# Patient Record
Sex: MALE | Race: WHITE | Employment: OTHER | URBAN - METROPOLITAN AREA
[De-identification: names, ages, dates, MRNs, and addresses within clinical notes are randomized per-mention and may not be internally consistent; named-entity substitution may affect disease eponyms.]

---

## 2017-02-22 RX ORDER — PRAVASTATIN SODIUM 20 MG/1
TABLET ORAL
Qty: 90 TAB | Refills: 0 | Status: SHIPPED | OUTPATIENT
Start: 2017-02-22 | End: 2017-06-19 | Stop reason: SDUPTHER

## 2017-04-14 RX ORDER — ALLOPURINOL 300 MG/1
TABLET ORAL
Qty: 30 TAB | Refills: 0 | Status: SHIPPED | OUTPATIENT
Start: 2017-04-14 | End: 2017-05-11 | Stop reason: SDUPTHER

## 2017-04-14 RX ORDER — TERAZOSIN 10 MG/1
CAPSULE ORAL
Qty: 30 CAP | Refills: 0 | Status: SHIPPED | OUTPATIENT
Start: 2017-04-14 | End: 2017-05-11 | Stop reason: SDUPTHER

## 2017-04-14 RX ORDER — HYDROCHLOROTHIAZIDE 25 MG/1
TABLET ORAL
Qty: 90 TAB | Refills: 0 | Status: SHIPPED | OUTPATIENT
Start: 2017-04-14 | End: 2017-07-15 | Stop reason: SDUPTHER

## 2017-05-04 RX ORDER — ATENOLOL 50 MG/1
TABLET ORAL
Qty: 180 TAB | Refills: 0 | Status: SHIPPED | OUTPATIENT
Start: 2017-05-04 | End: 2017-07-26 | Stop reason: SDUPTHER

## 2017-05-11 RX ORDER — TERAZOSIN 10 MG/1
CAPSULE ORAL
Qty: 30 CAP | Refills: 0 | Status: SHIPPED | OUTPATIENT
Start: 2017-05-11 | End: 2017-06-06 | Stop reason: ALTCHOICE

## 2017-05-11 RX ORDER — ALLOPURINOL 300 MG/1
TABLET ORAL
Qty: 30 TAB | Refills: 0 | Status: SHIPPED | OUTPATIENT
Start: 2017-05-11 | End: 2017-06-11 | Stop reason: SDUPTHER

## 2017-05-22 ENCOUNTER — TELEPHONE (OUTPATIENT)
Dept: FAMILY MEDICINE CLINIC | Age: 82
End: 2017-05-22

## 2017-05-22 RX ORDER — ALPRAZOLAM 0.5 MG/1
TABLET ORAL
Qty: 60 TAB | Refills: 3 | Status: SHIPPED | OUTPATIENT
Start: 2017-05-22 | End: 2017-10-18 | Stop reason: SDUPTHER

## 2017-06-06 ENCOUNTER — HOSPITAL ENCOUNTER (OUTPATIENT)
Dept: LAB | Age: 82
Discharge: HOME | End: 2017-06-06
Payer: MEDICARE

## 2017-06-06 ENCOUNTER — OFFICE VISIT (OUTPATIENT)
Dept: FAMILY MEDICINE CLINIC | Age: 82
End: 2017-06-06

## 2017-06-06 VITALS
HEART RATE: 56 BPM | DIASTOLIC BLOOD PRESSURE: 74 MMHG | HEIGHT: 67 IN | BODY MASS INDEX: 25.9 KG/M2 | RESPIRATION RATE: 16 BRPM | WEIGHT: 165 LBS | SYSTOLIC BLOOD PRESSURE: 135 MMHG | OXYGEN SATURATION: 97 % | TEMPERATURE: 96.7 F

## 2017-06-06 DIAGNOSIS — E11.9 TYPE 2 DIABETES MELLITUS WITHOUT COMPLICATION, WITHOUT LONG-TERM CURRENT USE OF INSULIN (HCC): Primary | ICD-10-CM

## 2017-06-06 DIAGNOSIS — I10 ESSENTIAL HYPERTENSION: ICD-10-CM

## 2017-06-06 LAB — HBA1C MFR BLD HPLC: 6.8 %

## 2017-06-06 PROCEDURE — 80053 COMPREHEN METABOLIC PANEL: CPT

## 2017-06-06 PROCEDURE — 82043 UR ALBUMIN QUANTITATIVE: CPT

## 2017-06-06 PROCEDURE — 80061 LIPID PANEL: CPT

## 2017-06-06 PROCEDURE — 85025 COMPLETE CBC W/AUTO DIFF WBC: CPT

## 2017-06-06 PROCEDURE — 36415 COLL VENOUS BLD VENIPUNCTURE: CPT

## 2017-06-06 RX ORDER — TRIAZOLAM 0.25 MG/1
0.25 TABLET ORAL
Qty: 30 TAB | Refills: 5 | Status: SHIPPED | OUTPATIENT
Start: 2017-06-06 | End: 2017-10-18 | Stop reason: SDUPTHER

## 2017-06-06 NOTE — MR AVS SNAPSHOT
Visit Information Date & Time Provider Department Dept. Phone Encounter #  
 6/6/2017  9:00 AM Mayra Devries MD [facility] [phone] [id number] Follow-up Instructions Return in about 4 months (around 10/6/2017). Upcoming Health Maintenance Date Due MICROALBUMIN Q1 10/19/2016 EYE EXAM RETINAL OR DILATED Q1 6/8/2017 HEMOGLOBIN A1C Q6M 6/28/2017 INFLUENZA AGE 9 TO ADULT 8/1/2017 FOOT EXAM Q1 8/2/2017 MEDICARE YEARLY EXAM 8/3/2017 Pneumococcal 65+ Low/Medium Risk (2 of 2 - PPSV23) 8/28/2017 LIPID PANEL Q1 12/28/2017 GLAUCOMA SCREENING Q2Y 6/8/2018 DTaP/Tdap/Td series (2 - Td) 5/16/2026 Allergies as of 6/6/2017  Review Complete On: 6/6/2017 By: Mayra Devries MD  
  
 Severity Noted Reaction Type Reactions Bactrim [Sulfamethoprim Ds]  11/17/2016    Nausea Only Ciprofloxacin  04/18/2014   Side Effect Other (comments) Nauseated, felt fuzzy Erythromycin  04/30/2010    Unknown (comments) Pcn [Penicillins]  04/30/2010    Swelling Current Immunizations  Reviewed on 8/2/2016 Name Date Influenza High Dose Vaccine PF 9/12/2016, 11/30/2015, 10/13/2014 Influenza Vaccine 11/18/2013 Influenza Vaccine Split 10/31/2012, 10/17/2011, 10/29/2010 Pneumococcal Vaccine (Unspecified Type) 8/28/2012 TD Vaccine 8/28/2012 Not reviewed this visit You Were Diagnosed With   
  
 Codes Comments Type 2 diabetes mellitus without complication, without long-term current use of insulin (HCC)    -  Primary ICD-10-CM: E11.9 ICD-9-CM: 250.00 Essential hypertension     ICD-10-CM: I10 
ICD-9-CM: 401.9 Vitals BP Pulse Temp Resp Height(growth percentile) Weight(growth percentile) 135/74 (!) 56 96.7 °F (35.9 °C) (Oral) 16 5' 7\" (1.702 m) 165 lb (74.8 kg) SpO2 BMI Smoking Status 97% 25.84 kg/m2 Former Smoker Vitals History BMI and BSA Data Body Mass Index Body Surface Area 25.84 kg/m 2 1.88 m 2 Preferred Pharmacy Pharmacy Name Phone [facility] [address], [address] [place] [address] Avenue [phone] Your Updated Medication List  
  
   
This list is accurate as of: 6/6/17 10:13 AM.  Always use your most recent med list.  
  
  
  
  
 allopurinol 300 mg tablet Commonly known as:  ZYLOPRIM  
TAKE ONE TABLET BY MOUTH ONCE DAILY TO PREVENT GOUT  
  
 ALPRAZolam 0.5 mg tablet Commonly known as:  XANAX  
TAKE ONE TABLET BY MOUTH TWICE DAILY AS NEEDED FOR ANXIETY  
  
 aspirin 325 mg tablet Commonly known as:  ASPIRIN Take 325 mg by mouth daily. atenolol 50 mg tablet Commonly known as:  TENORMIN  
TAKE ONE TABLET BY MOUTH TWICE DAILY FOR BLOOD PRESSURE  
  
 diphenoxylate-atropine 2.5-0.025 mg per tablet Commonly known as:  LOMOTIL Take 1 Tab by mouth four (4) times daily as needed for Diarrhea. Max Daily Amount: 4 Tabs. furosemide 40 mg tablet Commonly known as:  LASIX One tab po daily as needed for fluid  
  
 hydroCHLOROthiazide 25 mg tablet Commonly known as:  HYDRODIURIL  
TAKE ONE TABLET BY MOUTH ONCE DAILY MULTIPLE VITAMINS PO Take 1 Tab by mouth daily. pravastatin 20 mg tablet Commonly known as:  PRAVACHOL  
TAKE ONE TABLET BY MOUTH ONCE DAILY AT NIGHT FOR CHOLESTEROL  
  
 PRESERVISION AREDS 2 PO Take  by mouth. terazosin 10 mg capsule Commonly known as:  HYTRIN  
TAKE ONE CAPSULE BY MOUTH ONCE DAILY FOR BLOOD PRESSURE AND PROSTATE  
  
 triazolam 0.25 mg tablet Commonly known as:  Katelyn Presto Take 1 Tab by mouth nightly as needed. Max Daily Amount: 0.25 mg. VITAMIN D3 1,000 unit Cap Generic drug:  cholecalciferol Take 1 Tab by mouth daily. Prescriptions Printed Refills  
 triazolam (HALCION) 0.25 mg tablet 5 Sig: Take 1 Tab by mouth nightly as needed. Max Daily Amount: 0.25 mg.  
 Class: Print Route: Oral  
  
We Performed the Following AMB POC HEMOGLOBIN A1C [13537 CPT(R)] CBC WITH AUTOMATED DIFF [36508 CPT(R)]  DIABETES FOOT EXAM [HM7 Custom] LIPID PANEL [90172 CPT(R)] METABOLIC PANEL, COMPREHENSIVE [91786 CPT(R)] MICROALBUMIN, UR, RAND W/ MICROALBUMIN/CREA RATIO [id number] CPT(R)] Follow-up Instructions Return in about 4 months (around 10/6/2017). Introducing [facility] & HEALTH SERVICES! Dear Maria C Gilmore: 
Thank you for requesting a [employer] account. Our records indicate that you have previously registered for a [employer] account but its currently inactive. Please call our [employer] support line at [phone]. Additional Information If you have questions, please visit the Frequently Asked Questions section of the [employer] website at [email or web address] Remember, [employer] is NOT to be used for urgent needs. For medical emergencies, dial 911. Now available from your iPhone and Android! Please provide this summary of care documentation to your next provider. Your primary care clinician is listed as Heather Mohan. If you have any questions after today's visit, please call [phone].

## 2017-06-06 NOTE — PROGRESS NOTES
Chief Complaint   Patient presents with    Hypertension    Diabetes    Gout     1. Have you been to the ER, urgent care clinic since your last visit? Hospitalized since your last visit? No    2. Have you seen or consulted any other health care providers outside of the [address] [place] [address] since your last visit? Include any pap smears or colon screening.  No     Health Maintenance Due   Topic Date Due    MICROALBUMIN Q1  10/19/2016    EYE EXAM RETINAL OR DILATED Q1  06/08/2017    HEMOGLOBIN A1C Q6M  06/28/2017

## 2017-06-06 NOTE — PROGRESS NOTES
HISTORY OF PRESENT ILLNESS  Felton Choe is a 80 y.o. male. HPI Pt. Comes in for blood pressure and cholesterol check. Has been dxed with macular degeneration, and is on vitamins. Is also not sleeping well, down to 4-5 hours a night. Used to sleep much better on Halcion. Has also developed several skin tags that need to be checked. Has also been itching a lot. Feet get red when stands on feet for awhile. ROS    Physical Exam   Constitutional: He appears well-developed and well-nourished. HENT:   Right Ear: External ear normal.   Left Ear: External ear normal.   Mouth/Throat: Oropharynx is clear and moist.   Neck: No thyromegaly present. Cardiovascular: Normal rate, regular rhythm, normal heart sounds and intact distal pulses. Pulmonary/Chest: Effort normal and breath sounds normal. No respiratory distress. He has no wheezes. Abdominal: Soft. Bowel sounds are normal. He exhibits no distension and no mass. There is no tenderness. There is no guarding. Musculoskeletal: Normal range of motion. He exhibits no edema. Lymphadenopathy:     He has no cervical adenopathy. Skin:   Several seborrheic keratoses   Nursing note and vitals reviewed. ASSESSMENT and PLAN  Orders Placed This Encounter    CBC WITH AUTOMATED DIFF    METABOLIC PANEL, COMPREHENSIVE    LIPID PANEL    MICROALBUMIN, UR, RAND W/ MICROALBUMIN/CREA RATIO    AMB POC HEMOGLOBIN A1C     DIABETES FOOT EXAM    triazolam (HALCION) 0.25 mg tablet     Alfonso Alston was seen today for hypertension, diabetes and gout.     Diagnoses and all orders for this visit:    Type 2 diabetes mellitus without complication, without long-term current use of insulin ([facility])  -     CBC WITH AUTOMATED DIFF  -     METABOLIC PANEL, COMPREHENSIVE  -     LIPID PANEL  -     MICROALBUMIN, UR, RAND W/ MICROALBUMIN/CREA RATIO  -      DIABETES FOOT EXAM  -     AMB POC HEMOGLOBIN A1C    Essential hypertension    Other orders  -     triazolam (HALCION) 0.25 mg tablet; Take 1 Tab by mouth nightly as needed. Max Daily Amount: 0.25 mg. Follow-up Disposition:  Return in about 4 months (around 10/6/2017).

## 2017-06-07 LAB
ALBUMIN SERPL-MCNC: 4.6 G/DL (ref 3.5–4.7)
ALBUMIN/CREAT UR: 39.2 MG/G CREAT (ref 0–30)
ALBUMIN/GLOB SERPL: 2.3 {RATIO} (ref 1.2–2.2)
ALP SERPL-CCNC: 67 IU/L (ref 39–117)
ALT SERPL-CCNC: 15 IU/L (ref 0–44)
AST SERPL-CCNC: 14 IU/L (ref 0–40)
BASOPHILS # BLD AUTO: 0 X10E3/UL (ref 0–0.2)
BASOPHILS NFR BLD AUTO: 1 %
BILIRUB SERPL-MCNC: 1.3 MG/DL (ref 0–1.2)
BUN SERPL-MCNC: 30 MG/DL (ref 8–27)
BUN/CREAT SERPL: 25 (ref 10–24)
CALCIUM SERPL-MCNC: 9.2 MG/DL (ref 8.6–10.2)
CHLORIDE SERPL-SCNC: 100 MMOL/L (ref 96–106)
CHOLEST SERPL-MCNC: 118 MG/DL (ref 100–199)
CO2 SERPL-SCNC: 23 MMOL/L (ref 18–29)
CREAT SERPL-MCNC: 1.22 MG/DL (ref 0.76–1.27)
CREAT UR-MCNC: 60.7 MG/DL
EOSINOPHIL # BLD AUTO: 0.1 X10E3/UL (ref 0–0.4)
EOSINOPHIL NFR BLD AUTO: 2 %
ERYTHROCYTE [DISTWIDTH] IN BLOOD BY AUTOMATED COUNT: 15.5 % (ref 12.3–15.4)
GLOBULIN SER CALC-MCNC: 2 G/DL (ref 1.5–4.5)
GLUCOSE SERPL-MCNC: 153 MG/DL (ref 65–99)
HCT VFR BLD AUTO: 39 % (ref 37.5–51)
HDLC SERPL-MCNC: 30 MG/DL
HGB BLD-MCNC: 12.9 G/DL (ref 12.6–17.7)
IMM GRANULOCYTES # BLD: 0 X10E3/UL (ref 0–0.1)
IMM GRANULOCYTES NFR BLD: 0 %
INTERPRETATION, 910389: NORMAL
INTERPRETATION: NORMAL
LDLC SERPL CALC-MCNC: 64 MG/DL (ref 0–99)
LYMPHOCYTES # BLD AUTO: 1 X10E3/UL (ref 0.7–3.1)
LYMPHOCYTES NFR BLD AUTO: 17 %
Lab: NORMAL
Lab: NORMAL
MCH RBC QN AUTO: 28.3 PG (ref 26.6–33)
MCHC RBC AUTO-ENTMCNC: 33.1 G/DL (ref 31.5–35.7)
MCV RBC AUTO: 86 FL (ref 79–97)
MICROALBUMIN UR-MCNC: 23.8 UG/ML
MONOCYTES # BLD AUTO: 0.4 X10E3/UL (ref 0.1–0.9)
MONOCYTES NFR BLD AUTO: 8 %
NEUTROPHILS # BLD AUTO: 4 X10E3/UL (ref 1.4–7)
NEUTROPHILS NFR BLD AUTO: 72 %
PDF IMAGE, 910387: NORMAL
PLATELET # BLD AUTO: 113 X10E3/UL (ref 150–379)
POTASSIUM SERPL-SCNC: 4.3 MMOL/L (ref 3.5–5.2)
PROT SERPL-MCNC: 6.6 G/DL (ref 6–8.5)
RBC # BLD AUTO: 4.56 X10E6/UL (ref 4.14–5.8)
SODIUM SERPL-SCNC: 142 MMOL/L (ref 134–144)
TRIGL SERPL-MCNC: 121 MG/DL (ref 0–149)
VLDLC SERPL CALC-MCNC: 24 MG/DL (ref 5–40)
WBC # BLD AUTO: 5.5 X10E3/UL (ref 3.4–10.8)

## 2017-06-12 RX ORDER — ALLOPURINOL 300 MG/1
TABLET ORAL
Qty: 30 TAB | Refills: 0 | Status: SHIPPED | OUTPATIENT
Start: 2017-06-12 | End: 2017-07-13 | Stop reason: SDUPTHER

## 2017-06-12 RX ORDER — TERAZOSIN 10 MG/1
CAPSULE ORAL
Qty: 30 CAP | Refills: 0 | Status: SHIPPED | OUTPATIENT
Start: 2017-06-12 | End: 2017-07-13 | Stop reason: SDUPTHER

## 2017-06-19 RX ORDER — PRAVASTATIN SODIUM 20 MG/1
TABLET ORAL
Qty: 90 TAB | Refills: 0 | Status: SHIPPED | OUTPATIENT
Start: 2017-06-19 | End: 2017-09-12 | Stop reason: SDUPTHER

## 2017-07-13 RX ORDER — TERAZOSIN 10 MG/1
CAPSULE ORAL
Qty: 30 CAP | Refills: 0 | Status: SHIPPED | OUTPATIENT
Start: 2017-07-13 | End: 2017-08-11 | Stop reason: SDUPTHER

## 2017-07-13 RX ORDER — ALLOPURINOL 300 MG/1
TABLET ORAL
Qty: 30 TAB | Refills: 0 | Status: SHIPPED | OUTPATIENT
Start: 2017-07-13 | End: 2017-08-11 | Stop reason: SDUPTHER

## 2017-07-17 RX ORDER — HYDROCHLOROTHIAZIDE 25 MG/1
TABLET ORAL
Qty: 90 TAB | Refills: 0 | Status: SHIPPED | OUTPATIENT
Start: 2017-07-17 | End: 2017-10-18 | Stop reason: SDUPTHER

## 2017-07-26 RX ORDER — ATENOLOL 50 MG/1
TABLET ORAL
Qty: 180 TAB | Refills: 0 | Status: SHIPPED | OUTPATIENT
Start: 2017-07-26 | End: 2017-10-18 | Stop reason: SDUPTHER

## 2017-07-28 ENCOUNTER — TELEPHONE (OUTPATIENT)
Dept: FAMILY MEDICINE CLINIC | Age: 82
End: 2017-07-28

## 2017-07-28 NOTE — TELEPHONE ENCOUNTER
Called pt. Pt complained [facility] was out of Atenolol. Called around and surrounding [facility] are also out. Called [id number] · [phone] and they had it in stock. Got permission from pt to use that Pharmacy. Order for Atenolol verbally given to Cristian Gave Pharmacist.  Pharmacist verbalized understanding. Informed pt that RX would be ready for  in 30 minutes. Pt verbalized understanding.

## 2017-07-28 NOTE — TELEPHONE ENCOUNTER
Pt states he has been trying to get an alternative for: atenolol (TENORMIN) 50 mg tablet    States he has been trying to get it since 7/25/17    There are no requests in CC   Pls give him a call     Best number to reach him is [phone]

## 2017-08-11 RX ORDER — ALLOPURINOL 300 MG/1
TABLET ORAL
Qty: 30 TAB | Refills: 0 | Status: SHIPPED | OUTPATIENT
Start: 2017-08-11 | End: 2017-09-12 | Stop reason: SDUPTHER

## 2017-08-11 RX ORDER — TERAZOSIN 10 MG/1
CAPSULE ORAL
Qty: 30 CAP | Refills: 0 | Status: SHIPPED | OUTPATIENT
Start: 2017-08-11 | End: 2017-09-12 | Stop reason: SDUPTHER

## 2017-08-18 ENCOUNTER — DOCUMENTATION ONLY (OUTPATIENT)
Dept: FAMILY MEDICINE CLINIC | Age: 82
End: 2017-08-18

## 2017-09-12 RX ORDER — TERAZOSIN 10 MG/1
CAPSULE ORAL
Qty: 30 CAP | Refills: 0 | Status: SHIPPED | OUTPATIENT
Start: 2017-09-12 | End: 2017-10-09 | Stop reason: SDUPTHER

## 2017-09-12 RX ORDER — ALLOPURINOL 300 MG/1
TABLET ORAL
Qty: 30 TAB | Refills: 0 | Status: SHIPPED | OUTPATIENT
Start: 2017-09-12 | End: 2017-10-09 | Stop reason: SDUPTHER

## 2017-09-12 RX ORDER — PRAVASTATIN SODIUM 20 MG/1
TABLET ORAL
Qty: 90 TAB | Refills: 0 | Status: SHIPPED | OUTPATIENT
Start: 2017-09-12 | End: 2017-10-18 | Stop reason: SDUPTHER

## 2017-10-09 RX ORDER — ALLOPURINOL 300 MG/1
TABLET ORAL
Qty: 30 TAB | Refills: 0 | Status: SHIPPED | OUTPATIENT
Start: 2017-10-09 | End: 2018-07-24 | Stop reason: SDUPTHER

## 2017-10-09 RX ORDER — TERAZOSIN 10 MG/1
CAPSULE ORAL
Qty: 30 CAP | Refills: 0 | Status: SHIPPED | OUTPATIENT
Start: 2017-10-09 | End: 2017-10-18 | Stop reason: SDUPTHER

## 2017-10-18 ENCOUNTER — OFFICE VISIT (OUTPATIENT)
Dept: FAMILY MEDICINE CLINIC | Age: 82
End: 2017-10-18

## 2017-10-18 ENCOUNTER — HOSPITAL ENCOUNTER (OUTPATIENT)
Dept: LAB | Age: 82
Discharge: HOME | End: 2017-10-18
Payer: MEDICARE

## 2017-10-18 VITALS
OXYGEN SATURATION: 98 % | HEIGHT: 67 IN | SYSTOLIC BLOOD PRESSURE: 129 MMHG | BODY MASS INDEX: 25.8 KG/M2 | RESPIRATION RATE: 14 BRPM | DIASTOLIC BLOOD PRESSURE: 59 MMHG | TEMPERATURE: 96.6 F | WEIGHT: 164.4 LBS | HEART RATE: 56 BPM

## 2017-10-18 DIAGNOSIS — I10 ESSENTIAL HYPERTENSION: ICD-10-CM

## 2017-10-18 DIAGNOSIS — E11.9 DIABETES MELLITUS TYPE 2, DIET-CONTROLLED (HCC): Primary | ICD-10-CM

## 2017-10-18 DIAGNOSIS — Z23 ENCOUNTER FOR IMMUNIZATION: ICD-10-CM

## 2017-10-18 DIAGNOSIS — Z00.00 ENCOUNTER FOR MEDICARE ANNUAL WELLNESS EXAM: ICD-10-CM

## 2017-10-18 LAB — HBA1C MFR BLD HPLC: 6.4 %

## 2017-10-18 PROCEDURE — 82043 UR ALBUMIN QUANTITATIVE: CPT

## 2017-10-18 PROCEDURE — 80061 LIPID PANEL: CPT

## 2017-10-18 PROCEDURE — 36415 COLL VENOUS BLD VENIPUNCTURE: CPT

## 2017-10-18 PROCEDURE — 80053 COMPREHEN METABOLIC PANEL: CPT

## 2017-10-18 PROCEDURE — 85025 COMPLETE CBC W/AUTO DIFF WBC: CPT

## 2017-10-18 RX ORDER — TRIAZOLAM 0.25 MG/1
0.25 TABLET ORAL
Qty: 90 TAB | Refills: 1 | Status: SHIPPED | OUTPATIENT
Start: 2017-10-18 | End: 2018-01-11 | Stop reason: SDUPTHER

## 2017-10-18 RX ORDER — PRAVASTATIN SODIUM 20 MG/1
TABLET ORAL
Qty: 90 TAB | Refills: 3 | Status: SHIPPED | OUTPATIENT
Start: 2017-10-18 | End: 2018-10-22 | Stop reason: SDUPTHER

## 2017-10-18 RX ORDER — ATENOLOL 50 MG/1
TABLET ORAL
Qty: 180 TAB | Refills: 3 | Status: SHIPPED | OUTPATIENT
Start: 2017-10-18 | End: 2018-05-14 | Stop reason: SDUPTHER

## 2017-10-18 RX ORDER — ALLOPURINOL 300 MG/1
300 TABLET ORAL DAILY
Qty: 90 TAB | Refills: 3 | Status: SHIPPED | OUTPATIENT
Start: 2017-10-18 | End: 2017-12-04 | Stop reason: SDUPTHER

## 2017-10-18 RX ORDER — TERAZOSIN 10 MG/1
CAPSULE ORAL
Qty: 90 CAP | Refills: 12 | Status: SHIPPED | OUTPATIENT
Start: 2017-10-18 | End: 2018-10-22 | Stop reason: SDUPTHER

## 2017-10-18 RX ORDER — ALPRAZOLAM 0.5 MG/1
TABLET ORAL
Qty: 60 TAB | Refills: 3 | Status: SHIPPED | OUTPATIENT
Start: 2017-10-18 | End: 2018-07-24 | Stop reason: SDUPTHER

## 2017-10-18 RX ORDER — HYDROCHLOROTHIAZIDE 25 MG/1
TABLET ORAL
Qty: 90 TAB | Refills: 3 | Status: SHIPPED | OUTPATIENT
Start: 2017-10-18 | End: 2018-10-04 | Stop reason: ALTCHOICE

## 2017-10-18 NOTE — PROGRESS NOTES
HISTORY OF PRESENT ILLNESS  Ayanna De Jesus is a 80 y.o. male. HPI In for medicare wellness exam.     Review of Systems   Constitutional: Negative for malaise/fatigue and weight loss. HENT: Positive for hearing loss. Negative for tinnitus. Eyes: Positive for blurred vision (followed by Victor Manuel Mohamud and Melissa). Negative for double vision. Respiratory: Negative for cough and hemoptysis. Cardiovascular: Negative for chest pain and orthopnea. Unloaded 1 1/2 tons of gravel last week- no chest pains or dyspnea. Gastrointestinal: Negative for abdominal pain and blood in stool. Genitourinary: Negative for dysuria and hematuria. Some nocturia, helped by halcion   Skin: Negative for itching and rash. Neurological: Negative for focal weakness and loss of consciousness. No falls   Psychiatric/Behavioral: Negative for depression. The patient has insomnia (takes halcion). No alcohol       Physical Exam   Constitutional: He appears well-developed and well-nourished. HENT:   Right Ear: External ear normal.   Left Ear: External ear normal.   Mouth/Throat: Oropharynx is clear and moist.   Neck: No thyromegaly present. Cardiovascular: Normal rate, regular rhythm, normal heart sounds and intact distal pulses. Pulmonary/Chest: Effort normal and breath sounds normal. No respiratory distress. He has no wheezes. Abdominal: Soft. Bowel sounds are normal. He exhibits no distension and no mass. There is no tenderness. There is no guarding. Musculoskeletal: Normal range of motion. He exhibits no edema. Lymphadenopathy:     He has no cervical adenopathy. Nursing note and vitals reviewed.       ASSESSMENT and PLAN  Orders Placed This Encounter    Influenza virus vaccine (FLUZONE HIGH-DOSE) 65 years and older    CBC WITH AUTOMATED DIFF    METABOLIC PANEL, COMPREHENSIVE    LIPID PANEL    MICROALBUMIN, UR, RAND W/ MICROALBUMIN/CREA RATIO    AMB POC HEMOGLOBIN A1C    HM DIABETES FOOT EXAM    SC IMMUNIZ ADMIN,1 SINGLE/COMB VAC/TOXOID    ALPRAZolam (XANAX) 0.5 mg tablet    hydroCHLOROthiazide (HYDRODIURIL) 25 mg tablet    pravastatin (PRAVACHOL) 20 mg tablet    atenolol (TENORMIN) 50 mg tablet    triazolam (HALCION) 0.25 mg tablet    terazosin (HYTRIN) 10 mg capsule    allopurinol (ZYLOPRIM) 300 mg tablet     Diagnoses and all orders for this visit:    1. Diabetes mellitus type 2, diet-controlled ([facility] [address]  -     CBC WITH AUTOMATED DIFF  -     METABOLIC PANEL, COMPREHENSIVE  -     LIPID PANEL  -     AMB POC HEMOGLOBIN A1C  -     MICROALBUMIN, UR, RAND W/ MICROALBUMIN/CREA RATIO  -      DIABETES FOOT EXAM    2. Encounter for Medicare annual wellness exam    3. Essential hypertension    4. Encounter for immunization  -     Influenza virus vaccine (FLUZONE HIGH-DOSE) 65 years and older  -     SC IMMUNIZ ADMIN,1 SINGLE/COMB VAC/TOXOID    Other orders  -     ALPRAZolam (XANAX) 0.5 mg tablet; TAKE ONE TABLET BY MOUTH TWICE DAILY AS NEEDED FOR ANXIETY  -     hydroCHLOROthiazide (HYDRODIURIL) 25 mg tablet; TAKE ONE TABLET BY MOUTH ONCE DAILY  -     pravastatin (PRAVACHOL) 20 mg tablet; TAKE ONE TABLET BY MOUTH ONCE DAILY AT  NIGHT  FOR  CHOLESTEROL  -     atenolol (TENORMIN) 50 mg tablet; TAKE ONE TABLET BY MOUTH TWICE DAILY FOR BLOOD PRESSURE  -     triazolam (HALCION) 0.25 mg tablet; Take 1 Tab by mouth nightly as needed. Max Daily Amount: 0.25 mg.  -     terazosin (HYTRIN) 10 mg capsule; TAKE ONE CAPSULE BY MOUTH ONCE DAILY FOR BLOOD PRESSURE AND PROSTATE  -     allopurinol (ZYLOPRIM) 300 mg tablet; Take 1 Tab by mouth daily. To prevent gout      Follow-up Disposition:  Return in about 6 months (around 4/18/2018).

## 2017-10-18 NOTE — MR AVS SNAPSHOT
Visit Information Date & Time Provider Department Dept. Phone Encounter #  
 10/18/2017  9:00 AM Reg Rodas MD [facility] [phone] [id number] Follow-up Instructions Return in about 6 months (around 4/18/2018). Upcoming Health Maintenance Date Due  
 EYE EXAM RETINAL OR DILATED Q1 6/8/2017 INFLUENZA AGE 9 TO ADULT 8/1/2017 MEDICARE YEARLY EXAM 8/3/2017 Pneumococcal 65+ Low/Medium Risk (2 of 2 - PPSV23) 8/28/2017 HEMOGLOBIN A1C Q6M 12/6/2017 FOOT EXAM Q1 6/6/2018 MICROALBUMIN Q1 6/6/2018 LIPID PANEL Q1 6/6/2018 GLAUCOMA SCREENING Q2Y 6/8/2018 DTaP/Tdap/Td series (2 - Td) 5/16/2026 Allergies as of 10/18/2017  Review Complete On: 10/18/2017 By: Reg Rodas MD  
  
 Severity Noted Reaction Type Reactions Bactrim [Sulfamethoprim Ds]  11/17/2016    Nausea Only Ciprofloxacin  04/18/2014   Side Effect Other (comments) Nauseated, felt fuzzy Erythromycin  04/30/2010    Unknown (comments) Pcn [Penicillins]  04/30/2010    Swelling Current Immunizations  Reviewed on 8/2/2016 Name Date Influenza High Dose Vaccine PF 9/12/2016, 11/30/2015, 10/13/2014 Influenza Vaccine 11/18/2013 Influenza Vaccine Split 10/31/2012, 10/17/2011, 10/29/2010 TD Vaccine 8/28/2012 ZZZ-RETIRED (DO NOT USE) Pneumococcal Vaccine (Unspecified Type) 8/28/2012 Not reviewed this visit You Were Diagnosed With   
  
 Codes Comments Diabetes mellitus type 2, [facility]    -  Primary ICD-10-CM: E11.9 ICD-9-CM: 250.00 Encounter for Medicare annual wellness exam     ICD-10-CM: Z00.00 ICD-9-CM: V70.0 Essential hypertension     ICD-10-CM: I10 
ICD-9-CM: 401.9 Vitals BP Pulse Temp Resp Height(growth percentile) Weight(growth percentile) 129/59 (!) 56 96.6 °F (35.9 °C) (Oral) 14 5' 7\" (1.702 m) 164 lb 6.4 oz (74.6 kg) SpO2 BMI Smoking Status 98% 25.75 kg/m2 Former Smoker BMI and BSA Data Body Mass Index Body Surface Area 25.75 kg/m 2 1.88 m 2 Preferred Pharmacy Pharmacy Name Phone [facility] [address], [address] [place] [address] Avenue [phone] Your Updated Medication List  
  
   
This list is accurate as of: 10/18/17 10:05 AM.  Always use your most recent med list.  
  
  
  
  
 * allopurinol 300 mg tablet Commonly known as:  ZYLOPRIM  
TAKE ONE TABLET BY MOUTH ONCE DAILY TO PREVENT GOUT  
  
 * allopurinol 300 mg tablet Commonly known as:  Clementeen Steiner Take 1 Tab by mouth daily. To prevent gout ALPRAZolam 0.5 mg tablet Commonly known as:  XANAX  
TAKE ONE TABLET BY MOUTH TWICE DAILY AS NEEDED FOR ANXIETY  
  
 aspirin 325 mg tablet Commonly known as:  ASPIRIN Take 325 mg by mouth daily. atenolol 50 mg tablet Commonly known as:  TENORMIN  
TAKE ONE TABLET BY MOUTH TWICE DAILY FOR BLOOD PRESSURE  
  
 diphenoxylate-atropine 2.5-0.025 mg per tablet Commonly known as:  LOMOTIL Take 1 Tab by mouth four (4) times daily as needed for Diarrhea. Max Daily Amount: 4 Tabs. furosemide 40 mg tablet Commonly known as:  LASIX One tab po daily as needed for fluid  
  
 hydroCHLOROthiazide 25 mg tablet Commonly known as:  HYDRODIURIL  
TAKE ONE TABLET BY MOUTH ONCE DAILY  
  
 lisinopril 5 mg tablet Commonly known as:  Guido Paradise Take 1 Tab by mouth daily. For kidneys MULTIPLE VITAMINS PO Take 1 Tab by mouth daily. pravastatin 20 mg tablet Commonly known as:  PRAVACHOL  
TAKE ONE TABLET BY MOUTH ONCE DAILY AT  NIGHT  FOR  CHOLESTEROL  
  
 PRESERVISION AREDS 2 PO Take  by mouth. terazosin 10 mg capsule Commonly known as:  HYTRIN  
TAKE ONE CAPSULE BY MOUTH ONCE DAILY FOR BLOOD PRESSURE AND PROSTATE  
  
 triazolam 0.25 mg tablet Commonly known as:  Loyde Salmons Take 1 Tab by mouth nightly as needed. Max Daily Amount: 0.25 mg. VITAMIN D3 1,000 unit Cap Generic drug:  cholecalciferol Take 1 Tab by mouth daily. * Notice: This list has 2 medication(s) that are the same as other medications prescribed for you. Read the directions carefully, and ask your doctor or other care provider to review them with you. Prescriptions Printed Refills ALPRAZolam (XANAX) 0.5 mg tablet 3 Sig: TAKE ONE TABLET BY MOUTH TWICE DAILY AS NEEDED FOR ANXIETY Class: Print  
 triazolam (HALCION) 0.25 mg tablet 1 Sig: Take 1 Tab by mouth nightly as needed. Max Daily Amount: 0.25 mg.  
 Class: Print Route: Oral  
  
Prescriptions Sent to Pharmacy Refills  
 hydroCHLOROthiazide (HYDRODIURIL) 25 mg tablet 3 Sig: TAKE ONE TABLET BY MOUTH ONCE DAILY Class: Normal  
 Pharmacy: [facility] [address] [place], [address] [place] [address] Ph #: [phone]  
 pravastatin (PRAVACHOL) 20 mg tablet 3 Sig: TAKE ONE TABLET BY MOUTH ONCE DAILY AT  NIGHT  FOR  CHOLESTEROL Class: Normal  
 Pharmacy: [facility] [address] [place], [address] [place] [address] Ph #: [phone]  
 atenolol (TENORMIN) 50 mg tablet 3 Sig: TAKE ONE TABLET BY MOUTH TWICE DAILY FOR BLOOD PRESSURE Class: Normal  
 Pharmacy: [facility] [address] [place], [address] [place] [address] Ph #: [phone]  
 terazosin (HYTRIN) 10 mg capsule 12 Sig: TAKE ONE CAPSULE BY MOUTH ONCE DAILY FOR BLOOD PRESSURE AND PROSTATE Class: Normal  
 Pharmacy: [facility] [address] [place], [address] [place] [address] Ph #: [phone]  
 allopurinol (ZYLOPRIM) 300 mg tablet 3 Sig: Take 1 Tab by mouth daily. To prevent gout Class: Normal  
 Pharmacy: [facility] [address] [place], [address] [place] [address] Ph #: [phone] Route: Oral  
  
We Performed the Following AMB POC HEMOGLOBIN A1C [73694 CPT(R)] CBC WITH AUTOMATED DIFF [51120 CPT(R)]  DIABETES FOOT EXAM [7 Custom] LIPID PANEL [49691 CPT(R)] METABOLIC PANEL, COMPREHENSIVE [89974 CPT(R)] MICROALBUMIN, UR, RAND W/ MICROALBUMIN/CREA RATIO [id number] CPT(R)] Follow-up Instructions Return in about 6 months (around 4/18/2018). Introducing [facility] & HEALTH SERVICES! Dear Paige: 
Thank you for requesting a [employer] account. Our records indicate that you have previously registered for a [employer] account but its currently inactive. Please call our [employer] support line at [phone]. Additional Information If you have questions, please visit the Frequently Asked Questions section of the [employer] website at [email or web address] Remember, [employer] is NOT to be used for urgent needs. For medical emergencies, dial 911. Now available from your iPhone and Android! Please provide this summary of care documentation to your next provider. Your primary care clinician is listed as Rosalie Reddy. If you have any questions after today's visit, please call [phone].

## 2017-10-18 NOTE — ACP (ADVANCE CARE PLANNING)
Undecided about ventilator or feeding tubes at this time. Would want daughter, Marck Sen, to make medical decisions for him, and son Benson Escalera, after her.

## 2017-10-19 LAB
ALBUMIN SERPL-MCNC: 4.6 G/DL (ref 3.5–4.7)
ALBUMIN/CREAT UR: 15.8 MG/G CREAT (ref 0–30)
ALBUMIN/GLOB SERPL: 2.2 {RATIO} (ref 1.2–2.2)
ALP SERPL-CCNC: 96 IU/L (ref 39–117)
ALT SERPL-CCNC: 14 IU/L (ref 0–44)
AST SERPL-CCNC: 15 IU/L (ref 0–40)
BASOPHILS # BLD AUTO: 0 X10E3/UL (ref 0–0.2)
BASOPHILS NFR BLD AUTO: 1 %
BILIRUB SERPL-MCNC: 1.3 MG/DL (ref 0–1.2)
BUN SERPL-MCNC: 27 MG/DL (ref 8–27)
BUN/CREAT SERPL: 23 (ref 10–24)
CALCIUM SERPL-MCNC: 9.5 MG/DL (ref 8.6–10.2)
CHLORIDE SERPL-SCNC: 102 MMOL/L (ref 96–106)
CHOLEST SERPL-MCNC: 125 MG/DL (ref 100–199)
CO2 SERPL-SCNC: 25 MMOL/L (ref 18–29)
CREAT SERPL-MCNC: 1.19 MG/DL (ref 0.76–1.27)
CREAT UR-MCNC: 48.2 MG/DL
EOSINOPHIL # BLD AUTO: 0.3 X10E3/UL (ref 0–0.4)
EOSINOPHIL NFR BLD AUTO: 4 %
ERYTHROCYTE [DISTWIDTH] IN BLOOD BY AUTOMATED COUNT: 14.7 % (ref 12.3–15.4)
GFR SERPLBLD CREATININE-BSD FMLA CKD-EPI: 56 ML/MIN/1.73
GFR SERPLBLD CREATININE-BSD FMLA CKD-EPI: 64 ML/MIN/1.73
GLOBULIN SER CALC-MCNC: 2.1 G/DL (ref 1.5–4.5)
GLUCOSE SERPL-MCNC: 130 MG/DL (ref 65–99)
HCT VFR BLD AUTO: 36.2 % (ref 37.5–51)
HDLC SERPL-MCNC: 35 MG/DL
HGB BLD-MCNC: 12.7 G/DL (ref 12.6–17.7)
IMM GRANULOCYTES # BLD: 0 X10E3/UL (ref 0–0.1)
IMM GRANULOCYTES NFR BLD: 0 %
INTERPRETATION, 910389: NORMAL
INTERPRETATION: NORMAL
LDLC SERPL CALC-MCNC: 74 MG/DL (ref 0–99)
LYMPHOCYTES # BLD AUTO: 1 X10E3/UL (ref 0.7–3.1)
LYMPHOCYTES NFR BLD AUTO: 16 %
Lab: NORMAL
Lab: NORMAL
MCH RBC QN AUTO: 29.3 PG (ref 26.6–33)
MCHC RBC AUTO-ENTMCNC: 35.1 G/DL (ref 31.5–35.7)
MCV RBC AUTO: 84 FL (ref 79–97)
MICROALBUMIN UR-MCNC: 7.6 UG/ML
MONOCYTES # BLD AUTO: 0.4 X10E3/UL (ref 0.1–0.9)
MONOCYTES NFR BLD AUTO: 7 %
NEUTROPHILS # BLD AUTO: 4.5 X10E3/UL (ref 1.4–7)
NEUTROPHILS NFR BLD AUTO: 72 %
PDF IMAGE, 910387: NORMAL
PLATELET # BLD AUTO: 121 X10E3/UL (ref 150–379)
POTASSIUM SERPL-SCNC: 3.7 MMOL/L (ref 3.5–5.2)
PROT SERPL-MCNC: 6.7 G/DL (ref 6–8.5)
RBC # BLD AUTO: 4.33 X10E6/UL (ref 4.14–5.8)
SODIUM SERPL-SCNC: 144 MMOL/L (ref 134–144)
TRIGL SERPL-MCNC: 82 MG/DL (ref 0–149)
VLDLC SERPL CALC-MCNC: 16 MG/DL (ref 5–40)
WBC # BLD AUTO: 6.2 X10E3/UL (ref 3.4–10.8)

## 2017-12-04 ENCOUNTER — OFFICE VISIT (OUTPATIENT)
Dept: FAMILY MEDICINE CLINIC | Age: 82
End: 2017-12-04

## 2017-12-04 ENCOUNTER — HOSPITAL ENCOUNTER (OUTPATIENT)
Dept: LAB | Age: 82
Discharge: HOME | End: 2017-12-04
Payer: MEDICARE

## 2017-12-04 VITALS
DIASTOLIC BLOOD PRESSURE: 60 MMHG | HEART RATE: 58 BPM | HEIGHT: 67 IN | OXYGEN SATURATION: 97 % | SYSTOLIC BLOOD PRESSURE: 140 MMHG | RESPIRATION RATE: 97 BRPM | WEIGHT: 158.6 LBS | BODY MASS INDEX: 24.89 KG/M2 | TEMPERATURE: 96.9 F

## 2017-12-04 DIAGNOSIS — N39.0 URINARY TRACT INFECTION WITHOUT HEMATURIA, SITE UNSPECIFIED: Primary | ICD-10-CM

## 2017-12-04 DIAGNOSIS — I10 ESSENTIAL HYPERTENSION: ICD-10-CM

## 2017-12-04 DIAGNOSIS — R39.12 WEAK URINARY STREAM: ICD-10-CM

## 2017-12-04 LAB
BACTERIA UA POCT, BACTPOCT: NORMAL
BILIRUB UR QL STRIP: NEGATIVE
CASTS UA POCT: NORMAL
CLUE CELLS, CLUEPOCT: NEGATIVE
CRYSTALS UA POCT, CRYSPOCT: NEGATIVE
EPITHELIAL CELLS POCT: NORMAL
GLUCOSE UR-MCNC: NEGATIVE MG/DL
KETONES P FAST UR STRIP-MCNC: NEGATIVE MG/DL
MUCUS UA POCT, MUCPOCT: NORMAL
PH UR STRIP: 5 [PH] (ref 4.6–8)
PROT UR QL STRIP: NEGATIVE
RBC UA POCT, RBCPOCT: NORMAL
SP GR UR STRIP: 1.02 (ref 1–1.03)
TRICH UA POCT, TRICHPOC: NEGATIVE
UA UROBILINOGEN AMB POC: NORMAL (ref 0.2–1)
URINALYSIS CLARITY POC: CLEAR
URINALYSIS COLOR POC: YELLOW
URINE BLOOD POC: NORMAL
URINE CULT COMMENT, POCT: NORMAL
URINE LEUKOCYTES POC: NEGATIVE
URINE NITRITES POC: NEGATIVE
WBC UA POCT, WBCPOCT: NORMAL
YEAST UA POCT, YEASTPOC: NEGATIVE

## 2017-12-04 PROCEDURE — 85025 COMPLETE CBC W/AUTO DIFF WBC: CPT

## 2017-12-04 PROCEDURE — 84153 ASSAY OF PSA TOTAL: CPT

## 2017-12-04 PROCEDURE — 36415 COLL VENOUS BLD VENIPUNCTURE: CPT

## 2017-12-04 PROCEDURE — 87088 URINE BACTERIA CULTURE: CPT

## 2017-12-04 PROCEDURE — 80053 COMPREHEN METABOLIC PANEL: CPT

## 2017-12-04 RX ORDER — SULFAMETHOXAZOLE AND TRIMETHOPRIM 800; 160 MG/1; MG/1
1 TABLET ORAL 2 TIMES DAILY
Qty: 20 TAB | Refills: 0 | Status: SHIPPED | OUTPATIENT
Start: 2017-12-04 | End: 2017-12-14

## 2017-12-04 RX ORDER — DOXYCYCLINE 100 MG/1
100 CAPSULE ORAL 2 TIMES DAILY
Qty: 20 CAP | Refills: 0 | Status: SHIPPED | OUTPATIENT
Start: 2017-12-04 | End: 2017-12-04 | Stop reason: ALTCHOICE

## 2017-12-04 NOTE — PROGRESS NOTES
HISTORY OF PRESENT ILLNESS  Jaziel Lara is a 80 y.o. male. HPI was working in yard 2 weeks ago, began having abdominal pain, diarrhea, dysuria, anorexia. This spell lasted 4 days. Had another episode last Sunday- got better that same day. No hematuria. ROS    Physical Exam   Constitutional: He appears well-developed and well-nourished. HENT:   Right Ear: External ear normal.   Left Ear: External ear normal.   Mouth/Throat: Oropharynx is clear and moist.   Neck: No thyromegaly present. Cardiovascular: Normal rate, regular rhythm, normal heart sounds and intact distal pulses. Pulmonary/Chest: Effort normal and breath sounds normal. No respiratory distress. He has no wheezes. Abdominal: Soft. Bowel sounds are normal. He exhibits no distension and no mass. There is no tenderness. There is no guarding. Large L inguinal hernia present   Genitourinary:   Genitourinary Comments: Enlarged prostate, no nodules. Heme negative   Musculoskeletal: Normal range of motion. He exhibits no edema. Lymphadenopathy:     He has no cervical adenopathy. Nursing note and vitals reviewed. ASSESSMENT and PLAN  Orders Placed This Encounter    CULTURE, URINE    CBC WITH AUTOMATED DIFF    METABOLIC PANEL, COMPREHENSIVE    PROSTATE SPECIFIC AG    AMB POC URINALYSIS DIP STICK AUTO W/ MICRO     DISCONTD: doxycycline (VIBRAMYCIN) 100 mg capsule    trimethoprim-sulfamethoxazole (BACTRIM DS, SEPTRA DS) 160-800 mg per tablet     Diagnoses and all orders for this visit:    1. Urinary tract infection without hematuria, site unspecified  -     AMB POC URINALYSIS DIP STICK AUTO W/ MICRO     2. Weak urinary stream  -     PROSTATE SPECIFIC AG  -     CULTURE, URINE    3. Essential hypertension  -     CBC WITH AUTOMATED DIFF  -     METABOLIC PANEL, COMPREHENSIVE    Other orders  -     trimethoprim-sulfamethoxazole (BACTRIM DS, SEPTRA DS) 160-800 mg per tablet;  Take 1 Tab by mouth two (2) times a day for 10 days.      Follow-up Disposition: Not on File

## 2017-12-04 NOTE — MR AVS SNAPSHOT
Visit Information Date & Time Provider Department Dept. Phone Encounter #  
 12/4/2017  2:45 PM Sam Collins MD [facility] [phone] [id number] Your Appointments 4/18/2018  9:00 AM  
ROUTINE CARE with Sam Collins MD  
[facility] [address]) Appt Note: 6 [facility] fu  
 [address] [facility] [place] [address] [phone] [address] [facility] [address] Upcoming Health Maintenance Date Due  
 EYE EXAM RETINAL OR DILATED Q1 6/8/2017 Pneumococcal 65+ Low/Medium Risk (2 of 2 - PPSV23) 8/28/2017 HEMOGLOBIN A1C Q6M 4/18/2018 GLAUCOMA SCREENING Q2Y 6/8/2018 FOOT EXAM Q1 10/18/2018 MICROALBUMIN Q1 10/18/2018 LIPID PANEL Q1 10/18/2018 MEDICARE YEARLY EXAM 10/19/2018 DTaP/Tdap/Td series (2 - Td) 5/16/2026 Allergies as of 12/4/2017  Review Complete On: 12/4/2017 By: Sam Collins MD  
  
 Severity Noted Reaction Type Reactions Bactrim [Sulfamethoprim Ds]  11/17/2016    Nausea Only Ciprofloxacin  04/18/2014   Side Effect Other (comments) Nauseated, felt fuzzy Erythromycin  04/30/2010    Unknown (comments) Pcn [Penicillins]  04/30/2010    Swelling Current Immunizations  Reviewed on 8/2/2016 Name Date Influenza High Dose Vaccine PF 10/18/2017, 9/12/2016, 11/30/2015, 10/13/2014 Influenza Vaccine 11/18/2013 Influenza Vaccine Split 10/31/2012, 10/17/2011, 10/29/2010 TD Vaccine 8/28/2012 ZZZ-RETIRED (DO NOT USE) Pneumococcal Vaccine (Unspecified Type) 8/28/2012 Not reviewed this visit You Were Diagnosed With   
  
 Codes Comments Urinary tract infection without hematuria, site unspecified    -  Primary ICD-10-CM: N39.0 ICD-9-CM: 599.0 Weak urinary stream     ICD-10-CM: R39.12 
ICD-9-CM: 788.62 Essential hypertension     ICD-10-CM: I10 
ICD-9-CM: 401.9 Vitals BP Pulse Temp Resp Height(growth percentile) Weight(growth percentile) 140/60 (!) 58 96.9 °F (36.1 °C) (Oral) (!) 97 5' 7\" (1.702 m) 158 lb 9.6 oz (71.9 kg) SpO2 BMI Smoking Status 97% 24.84 kg/m2 Former Smoker BMI and BSA Data Body Mass Index Body Surface Area  
 24.84 kg/m 2 1.84 m 2 Preferred Pharmacy Pharmacy Name Phone [facility] [address], [address] [place] [address] Avenue [phone] Your Updated Medication List  
  
   
This list is accurate as of: 12/4/17  4:07 PM.  Always use your most recent med list.  
  
  
  
  
 allopurinol 300 mg tablet Commonly known as:  ZYLOPRIM  
TAKE ONE TABLET BY MOUTH ONCE DAILY TO PREVENT GOUT  
  
 ALPRAZolam 0.5 mg tablet Commonly known as:  XANAX  
TAKE ONE TABLET BY MOUTH TWICE DAILY AS NEEDED FOR ANXIETY  
  
 aspirin 325 mg tablet Commonly known as:  ASPIRIN Take 325 mg by mouth daily. atenolol 50 mg tablet Commonly known as:  TENORMIN  
TAKE ONE TABLET BY MOUTH TWICE DAILY FOR BLOOD PRESSURE  
  
 diphenoxylate-atropine 2.5-0.025 mg per tablet Commonly known as:  LOMOTIL Take 1 Tab by mouth four (4) times daily as needed for Diarrhea. Max Daily Amount: 4 Tabs. furosemide 40 mg tablet Commonly known as:  LASIX One tab po daily as needed for fluid  
  
 hydroCHLOROthiazide 25 mg tablet Commonly known as:  HYDRODIURIL  
TAKE ONE TABLET BY MOUTH ONCE DAILY  
  
 lisinopril 5 mg tablet Commonly known as:  Arnold Files Take 1 Tab by mouth daily. For kidneys MULTIPLE VITAMINS PO Take 1 Tab by mouth daily. pravastatin 20 mg tablet Commonly known as:  PRAVACHOL  
TAKE ONE TABLET BY MOUTH ONCE DAILY AT  NIGHT  FOR  CHOLESTEROL  
  
 PRESERVISION AREDS 2 PO Take  by mouth. terazosin 10 mg capsule Commonly known as:  HYTRIN  
TAKE ONE CAPSULE BY MOUTH ONCE DAILY FOR BLOOD PRESSURE AND PROSTATE  
  
 triazolam 0.25 mg tablet Commonly known as:  Nara [place] Take 1 Tab by mouth nightly as needed. Max Daily Amount: 0.25 mg.  
  
 trimethoprim-sulfamethoxazole 160-800 mg per tablet Commonly known as:  BACTRIM DS, SEPTRA DS Take 1 Tab by mouth two (2) times a day for 10 days. VITAMIN D3 1,000 unit Cap Generic drug:  cholecalciferol Take 1 Tab by mouth daily. Prescriptions Sent to Pharmacy Refills  
 trimethoprim-sulfamethoxazole (BACTRIM DS, SEPTRA DS) 160-800 mg per tablet 0 Sig: Take 1 Tab by mouth two (2) times a day for 10 days. Class: Normal  
 Pharmacy: [address], [address] [place] [address] Avenue Ph #: [phone] Route: Oral  
  
We Performed the Following AMB POC URINALYSIS DIP STICK AUTO W/ MICRO  [89128 CPT(R)] AMB POC URINALYSIS DIP STICK AUTO W/ MICRO [74911 CPT(R)] CBC WITH AUTOMATED DIFF [28484 CPT(R)] CULTURE, URINE [id number] CPT(R)] METABOLIC PANEL, COMPREHENSIVE [22585 CPT(R)] PSA, DIAGNOSTIC (PROSTATE SPECIFIC AG) [id number] CPT(R)] Introducing [facility]! Dear Paige: 
Thank you for requesting a [employer] account. Our records indicate that you have previously registered for a [employer] account but its currently inactive. Please call our [employer] support line at [phone]. Additional Information If you have questions, please visit the Frequently Asked Questions section of the [employer] website at [email or web address] [email or web address]. [email or web address] Remember, [employer] is NOT to be used for urgent needs. For medical emergencies, dial 911. Now available from your iPhone and Android! Please provide this summary of care documentation to your next provider. Your primary care clinician is listed as Jaspal Johnson. If you have any questions after today's visit, please call [phone].

## 2017-12-04 NOTE — PROGRESS NOTES
Chief Complaint   Patient presents with    Urinary Burning     on and off  10 days     Urinary Frequency    Flank Pain     bilateral    Pre-op Exam     Dr. Amrit Porras    Left eye cataract removal  1/22/18   then the right eye  1/29/17    Headache    Diarrhea     1. Have you been to the ER, urgent care clinic since your last visit? Hospitalized since your last visit? No    2. Have you seen or consulted any other health care providers outside of the [address] [place] [address] since your last visit? Include any pap smears or colon screening.  No     Health Maintenance Due   Topic Date Due    EYE EXAM RETINAL OR DILATED Q1  06/08/2017    Pneumococcal 65+ Low/Medium Risk (2 of 2 - PPSV23) 08/28/2017     Pt refused the retinal scan

## 2017-12-05 LAB
ALBUMIN SERPL-MCNC: 4.5 G/DL (ref 3.5–4.7)
ALBUMIN/GLOB SERPL: 2.1 {RATIO} (ref 1.2–2.2)
ALP SERPL-CCNC: 167 IU/L (ref 39–117)
ALT SERPL-CCNC: 47 IU/L (ref 0–44)
AST SERPL-CCNC: 19 IU/L (ref 0–40)
BASOPHILS # BLD AUTO: 0.1 X10E3/UL (ref 0–0.2)
BASOPHILS NFR BLD AUTO: 1 %
BILIRUB SERPL-MCNC: 0.7 MG/DL (ref 0–1.2)
BUN SERPL-MCNC: 26 MG/DL (ref 8–27)
BUN/CREAT SERPL: 22 (ref 10–24)
CALCIUM SERPL-MCNC: 9.4 MG/DL (ref 8.6–10.2)
CHLORIDE SERPL-SCNC: 110 MMOL/L (ref 96–106)
CO2 SERPL-SCNC: 23 MMOL/L (ref 18–29)
CREAT SERPL-MCNC: 1.19 MG/DL (ref 0.76–1.27)
EOSINOPHIL # BLD AUTO: 0.2 X10E3/UL (ref 0–0.4)
EOSINOPHIL NFR BLD AUTO: 3 %
ERYTHROCYTE [DISTWIDTH] IN BLOOD BY AUTOMATED COUNT: 15 % (ref 12.3–15.4)
GFR SERPLBLD CREATININE-BSD FMLA CKD-EPI: 56 ML/MIN/1.73
GFR SERPLBLD CREATININE-BSD FMLA CKD-EPI: 64 ML/MIN/1.73
GLOBULIN SER CALC-MCNC: 2.1 G/DL (ref 1.5–4.5)
GLUCOSE SERPL-MCNC: 98 MG/DL (ref 65–99)
HCT VFR BLD AUTO: 35.7 % (ref 37.5–51)
HGB BLD-MCNC: 12.2 G/DL (ref 13–17.7)
IMM GRANULOCYTES # BLD: 0 X10E3/UL (ref 0–0.1)
IMM GRANULOCYTES NFR BLD: 0 %
INTERPRETATION: NORMAL
LYMPHOCYTES # BLD AUTO: 1.1 X10E3/UL (ref 0.7–3.1)
LYMPHOCYTES NFR BLD AUTO: 20 %
MCH RBC QN AUTO: 29.7 PG (ref 26.6–33)
MCHC RBC AUTO-ENTMCNC: 34.2 G/DL (ref 31.5–35.7)
MCV RBC AUTO: 87 FL (ref 79–97)
MONOCYTES # BLD AUTO: 0.4 X10E3/UL (ref 0.1–0.9)
MONOCYTES NFR BLD AUTO: 7 %
NEUTROPHILS # BLD AUTO: 3.8 X10E3/UL (ref 1.4–7)
NEUTROPHILS NFR BLD AUTO: 69 %
PLATELET # BLD AUTO: 174 X10E3/UL (ref 150–379)
POTASSIUM SERPL-SCNC: 4.7 MMOL/L (ref 3.5–5.2)
PROT SERPL-MCNC: 6.6 G/DL (ref 6–8.5)
PSA SERPL-MCNC: 33.3 NG/ML (ref 0–4)
RBC # BLD AUTO: 4.11 X10E6/UL (ref 4.14–5.8)
SODIUM SERPL-SCNC: 150 MMOL/L (ref 134–144)
WBC # BLD AUTO: 5.5 X10E3/UL (ref 3.4–10.8)

## 2017-12-06 LAB — BACTERIA UR CULT: NO GROWTH

## 2017-12-11 ENCOUNTER — TELEPHONE (OUTPATIENT)
Dept: FAMILY MEDICINE CLINIC | Age: 82
End: 2017-12-11

## 2017-12-11 DIAGNOSIS — R97.20 ELEVATED PSA: Primary | ICD-10-CM

## 2018-01-08 ENCOUNTER — TELEPHONE (OUTPATIENT)
Dept: FAMILY MEDICINE CLINIC | Age: 83
End: 2018-01-08

## 2018-01-08 NOTE — TELEPHONE ENCOUNTER
Pt needs a pre op scheduled before 1/22/18. Offered the pt 1/17/18 pt replied that would not give Dr. Dante Lantigua very much time. Pt requesting earlier appt.  Pt# [phone]

## 2018-01-11 ENCOUNTER — OFFICE VISIT (OUTPATIENT)
Dept: FAMILY MEDICINE CLINIC | Age: 83
End: 2018-01-11

## 2018-01-11 VITALS
WEIGHT: 161.2 LBS | HEART RATE: 62 BPM | RESPIRATION RATE: 14 BRPM | OXYGEN SATURATION: 97 % | DIASTOLIC BLOOD PRESSURE: 63 MMHG | SYSTOLIC BLOOD PRESSURE: 136 MMHG | HEIGHT: 67 IN | TEMPERATURE: 97.4 F | BODY MASS INDEX: 25.3 KG/M2

## 2018-01-11 DIAGNOSIS — I10 ESSENTIAL HYPERTENSION: ICD-10-CM

## 2018-01-11 DIAGNOSIS — F51.01 PRIMARY INSOMNIA: Primary | ICD-10-CM

## 2018-01-11 PROBLEM — E11.21 TYPE 2 DIABETES MELLITUS WITH NEPHROPATHY (HCC): Status: ACTIVE | Noted: 2018-01-11

## 2018-01-11 RX ORDER — TRIAZOLAM 0.25 MG/1
0.25 TABLET ORAL
Qty: 90 TAB | Refills: 1 | Status: SHIPPED | OUTPATIENT
Start: 2018-01-11 | End: 2018-07-24 | Stop reason: SDUPTHER

## 2018-01-11 NOTE — PROGRESS NOTES
Chief Complaint   Patient presents with    Pre-op Exam      cataract removal Left eye 1/22/18  and right eye 1/29/18  Dr. Jhony Arroyo     1. Have you been to the ER, urgent care clinic since your last visit? Hospitalized since your last visit? No    2. Have you seen or consulted any other health care providers outside of the [address] [place] [address] since your last visit? Include any pap smears or colon screening. No     Health Maintenance Due   Topic Date Due    EYE EXAM RETINAL OR DILATED Q1  06/08/2017     Pt refused retinal  Scan. Pt agreed to tell Dr. Jason Dutta to fax us the report of the eye exam since pt is seeing the eye Doctor in a week or two.

## 2018-01-11 NOTE — PROGRESS NOTES
HISTORY OF PRESENT ILLNESS  Sandy Bernal is a 80 y.o. male. HPIIn for pre op cataract physical.       ROS    Physical Exam   Constitutional: He appears well-developed and well-nourished. HENT:   Right Ear: External ear normal.   Left Ear: External ear normal.   Mouth/Throat: Oropharynx is clear and moist.   Neck: No thyromegaly present. Cardiovascular: Normal rate, regular rhythm, normal heart sounds and intact distal pulses. Pulmonary/Chest: Effort normal and breath sounds normal. No respiratory distress. He has no wheezes. Abdominal: Soft. Bowel sounds are normal. He exhibits no distension and no mass. There is no tenderness. There is no guarding. Musculoskeletal: Normal range of motion. He exhibits no edema. Lymphadenopathy:     He has no cervical adenopathy. Nursing note and vitals reviewed. vision 20/70 R eye, 20/100 L eye    ASSESSMENT and PLAN  Orders Placed This Encounter    AMB POC EKG ROUTINE W/ 12 LEADS, INTER & REP     Order Specific Question:   Reason for Exam:     Answer:   ess hypertens    triazolam (HALCION) 0.25 mg tablet     Sig: Take 1 Tab by mouth nightly as needed. Max Daily Amount: 0.25 mg. Dispense:  90 Tab     Refill:  1     Orders Placed This Encounter    AMB POC EKG ROUTINE W/ 12 LEADS, INTER & REP    triazolam (HALCION) 0.25 mg tablet     Diagnoses and all orders for this visit:    1. Primary insomnia  -     triazolam (HALCION) 0.25 mg tablet; Take 1 Tab by mouth nightly as needed.  Max Daily Amount: 0.25 mg.    2. Essential hypertension  -     AMB POC EKG ROUTINE W/ 12 LEADS, INTER & REP      strongly recommended proceeding with cataract surgery

## 2018-01-18 NOTE — PERIOP NOTES
[facility]  Ambulatory Surgery Unit  Pre-operative Instructions    Surgery/Procedure Date  1/22/18            Tentative Arrival Time 0745      1. On the day of your surgery/procedure, please report to the Ambulatory Surgery Unit Registration Desk and sign in at your designated time. The Ambulatory Surgery Unit is located in [facility] on the [facility] side of the [facility] across from the [address] [place] [address]. Please have all of your health insurance cards and a photo ID. 2. You must have someone with you to drive you home, as you should not drive a car for 24 hours following anesthesia. Please make arrangements for a responsible adult friend or family member to stay with you for at least the first 24 hours after your surgery. 3. Do not have anything to eat or drink (including water, gum, mints, coffee, juice) after midnight   1/21/18. This may not apply to medications prescribed by your physician. (Please note below the special instructions with medications to take the morning of surgery, if applicable.)    4. We recommend you do not drink any alcoholic beverages for 24 hours before and after your surgery. 5. Contact your surgeons office for instructions on the following medications: non-steroidal anti-inflammatory drugs (i.e. Advil, Aleve), vitamins, and supplements. (Some surgeons will want you to stop these medications prior to surgery and others may allow you to take them)   **If you are currently taking Plavix, Coumadin, Aspirin and/or other blood-thinning agents, contact your surgeon for instructions. ** Your surgeon will partner with the physician prescribing these medications to determine if it is safe to stop or if you need to continue taking. Please do not stop taking these medications without instructions from your surgeon.     6. In an effort to help prevent surgical site infection, we ask that you shower with an anti-bacterial soap (i.e. Dial or Safeguard) on the morning of surgery. Do not apply any lotions, powders, or deodorants after the shower on the day of your procedure. If applicable, please do not shave the operative site for 48 hours prior to surgery. 7. Wear comfortable clothes. Wear glasses instead of contacts. Do not bring any jewelry or money (other than copays or fees as instructed). Do not wear make-up, particularly mascara, the morning of your surgery. Do not wear nail polish, particularly if you are having foot /hand surgery. Wear your hair loose or down, no ponytails, buns, tamara pins or clips. All body piercings must be removed. 8. You should understand that if you do not follow these instructions your surgery may be cancelled. If your physical condition changes (i.e. fever, cold or flu) please contact your surgeon as soon as possible. 9. It is important that you be on time. If a situation occurs where you may be late, or if you have any questions or problems, please call [phone].    10. Your surgery time may be subject to change. You will receive a phone call the day prior to surgery to confirm your arrival time. 11. Pediatric patients: please bring a change of clothes, diapers, bottle/sippy cup, pacifier, etc.      Special Instructions: Take all medications and inhalers, as prescribed, on the morning of surgery with a sip of water EXCEPT: none      I understand a pre-operative phone call will be made to verify my surgery time. In the event that I am not available, I give permission for a message to be left on my answering service and/or with another person?       Yes     (instructions given verbally during phone assessment- pt voiced understanding)     ___________________      ___________________      ________________  (Signature of Patient)          (Witness)                   (Date and Time)

## 2018-01-19 ENCOUNTER — ANESTHESIA EVENT (OUTPATIENT)
Dept: SURGERY | Age: 83
End: 2018-01-19
Payer: MEDICARE

## 2018-01-19 RX ORDER — GENTAMICIN SULFATE 3 MG/ML
1 SOLUTION/ DROPS OPHTHALMIC
Status: COMPLETED | OUTPATIENT
Start: 2018-01-22 | End: 2018-01-22

## 2018-01-19 NOTE — PERIOP NOTES
Message left with Dr. Mary Tipton office regarding allergy to ciprofloxacin and the ordered Ofloxacin    Dr. Guerita Thomason called and wants Gentamicin 0.3% eye drops given pre-operatively x 3.

## 2018-01-22 ENCOUNTER — HOSPITAL ENCOUNTER (OUTPATIENT)
Age: 83
Setting detail: OUTPATIENT SURGERY
Discharge: HOME | End: 2018-01-22
Attending: OPHTHALMOLOGY | Admitting: OPHTHALMOLOGY
Payer: MEDICARE

## 2018-01-22 ENCOUNTER — ANESTHESIA (OUTPATIENT)
Dept: SURGERY | Age: 83
End: 2018-01-22
Payer: MEDICARE

## 2018-01-22 VITALS
HEART RATE: 57 BPM | SYSTOLIC BLOOD PRESSURE: 148 MMHG | TEMPERATURE: 98.2 F | OXYGEN SATURATION: 98 % | BODY MASS INDEX: 25.13 KG/M2 | HEIGHT: 67 IN | WEIGHT: 160.13 LBS | DIASTOLIC BLOOD PRESSURE: 68 MMHG | RESPIRATION RATE: 16 BRPM

## 2018-01-22 PROCEDURE — 77030021352 HC CBL LD SYS DISP COVD -B: Performed by: OPHTHALMOLOGY

## 2018-01-22 PROCEDURE — 77030018846 HC SOL IRR STRL H20 ICUM -A: Performed by: OPHTHALMOLOGY

## 2018-01-22 PROCEDURE — V2632 POST CHMBR INTRAOCULAR LENS: HCPCS | Performed by: OPHTHALMOLOGY

## 2018-01-22 PROCEDURE — 76210000050 HC AMBSU PH II REC 0.5 TO 1 HR: Performed by: OPHTHALMOLOGY

## 2018-01-22 PROCEDURE — 76030000000 HC AMB SURG OR TIME 0.5 TO 1: Performed by: OPHTHALMOLOGY

## 2018-01-22 PROCEDURE — 74011000250 HC RX REV CODE- 250

## 2018-01-22 PROCEDURE — 74011250636 HC RX REV CODE- 250/636: Performed by: OPHTHALMOLOGY

## 2018-01-22 PROCEDURE — 74011000250 HC RX REV CODE- 250: Performed by: OPHTHALMOLOGY

## 2018-01-22 PROCEDURE — 76060000061 HC AMB SURG ANES 0.5 TO 1 HR: Performed by: OPHTHALMOLOGY

## 2018-01-22 PROCEDURE — 77030038831 HC SEAL SYNTH RESURE OCCULR OCEL -G: Performed by: OPHTHALMOLOGY

## 2018-01-22 PROCEDURE — 74011250636 HC RX REV CODE- 250/636

## 2018-01-22 DEVICE — LENS IOL POST 1-PC 6X13 20.0 -- ACRYSOF: Type: IMPLANTABLE DEVICE | Site: EYE | Status: FUNCTIONAL

## 2018-01-22 RX ORDER — SODIUM CHLORIDE 0.9 % (FLUSH) 0.9 %
5-10 SYRINGE (ML) INJECTION AS NEEDED
Status: DISCONTINUED | OUTPATIENT
Start: 2018-01-22 | End: 2018-01-22 | Stop reason: HOSPADM

## 2018-01-22 RX ORDER — LIDOCAINE HYDROCHLORIDE 10 MG/ML
0.1 INJECTION, SOLUTION EPIDURAL; INFILTRATION; INTRACAUDAL; PERINEURAL AS NEEDED
Status: DISCONTINUED | OUTPATIENT
Start: 2018-01-22 | End: 2018-01-22 | Stop reason: HOSPADM

## 2018-01-22 RX ORDER — MIDAZOLAM HYDROCHLORIDE 1 MG/ML
INJECTION, SOLUTION INTRAMUSCULAR; INTRAVENOUS AS NEEDED
Status: DISCONTINUED | OUTPATIENT
Start: 2018-01-22 | End: 2018-01-22 | Stop reason: HOSPADM

## 2018-01-22 RX ORDER — FENTANYL CITRATE 50 UG/ML
25 INJECTION, SOLUTION INTRAMUSCULAR; INTRAVENOUS
Status: DISCONTINUED | OUTPATIENT
Start: 2018-01-22 | End: 2018-01-22 | Stop reason: HOSPADM

## 2018-01-22 RX ORDER — TROPICAMIDE 10 MG/ML
1 SOLUTION/ DROPS OPHTHALMIC
Status: COMPLETED | OUTPATIENT
Start: 2018-01-22 | End: 2018-01-22

## 2018-01-22 RX ORDER — TIMOLOL MALEATE 5 MG/ML
SOLUTION/ DROPS OPHTHALMIC AS NEEDED
Status: DISCONTINUED | OUTPATIENT
Start: 2018-01-22 | End: 2018-01-22 | Stop reason: HOSPADM

## 2018-01-22 RX ORDER — ONDANSETRON 2 MG/ML
4 INJECTION INTRAMUSCULAR; INTRAVENOUS AS NEEDED
Status: DISCONTINUED | OUTPATIENT
Start: 2018-01-22 | End: 2018-01-22 | Stop reason: HOSPADM

## 2018-01-22 RX ORDER — NEOMYCIN SULFATE, POLYMYXIN B SULFATE, AND DEXAMETHASONE 3.5; 10000; 1 MG/G; [USP'U]/G; MG/G
OINTMENT OPHTHALMIC AS NEEDED
Status: DISCONTINUED | OUTPATIENT
Start: 2018-01-22 | End: 2018-01-22 | Stop reason: HOSPADM

## 2018-01-22 RX ORDER — TROPICAMIDE 10 MG/ML
SOLUTION/ DROPS OPHTHALMIC
Status: COMPLETED
Start: 2018-01-22 | End: 2018-01-22

## 2018-01-22 RX ORDER — TETRACAINE HYDROCHLORIDE 5 MG/ML
SOLUTION OPHTHALMIC AS NEEDED
Status: DISCONTINUED | OUTPATIENT
Start: 2018-01-22 | End: 2018-01-22 | Stop reason: HOSPADM

## 2018-01-22 RX ORDER — GENTAMICIN SULFATE 3 MG/ML
SOLUTION/ DROPS OPHTHALMIC
Status: COMPLETED
Start: 2018-01-22 | End: 2018-01-22

## 2018-01-22 RX ORDER — SODIUM CHLORIDE 9 MG/ML
25 INJECTION, SOLUTION INTRAVENOUS CONTINUOUS
Status: DISCONTINUED | OUTPATIENT
Start: 2018-01-22 | End: 2018-01-22 | Stop reason: HOSPADM

## 2018-01-22 RX ORDER — SODIUM CHLORIDE, SODIUM LACTATE, POTASSIUM CHLORIDE, CALCIUM CHLORIDE 600; 310; 30; 20 MG/100ML; MG/100ML; MG/100ML; MG/100ML
25 INJECTION, SOLUTION INTRAVENOUS CONTINUOUS
Status: DISCONTINUED | OUTPATIENT
Start: 2018-01-22 | End: 2018-01-22 | Stop reason: HOSPADM

## 2018-01-22 RX ORDER — DIPHENHYDRAMINE HYDROCHLORIDE 50 MG/ML
12.5 INJECTION, SOLUTION INTRAMUSCULAR; INTRAVENOUS AS NEEDED
Status: DISCONTINUED | OUTPATIENT
Start: 2018-01-22 | End: 2018-01-22 | Stop reason: HOSPADM

## 2018-01-22 RX ORDER — SODIUM CHLORIDE 0.9 % (FLUSH) 0.9 %
5-10 SYRINGE (ML) INJECTION EVERY 8 HOURS
Status: DISCONTINUED | OUTPATIENT
Start: 2018-01-22 | End: 2018-01-22 | Stop reason: HOSPADM

## 2018-01-22 RX ADMIN — GENTAMICIN SULFATE 1 DROP: 3 SOLUTION OPHTHALMIC at 08:20

## 2018-01-22 RX ADMIN — GENTAMICIN SULFATE 1 DROP: 3 SOLUTION/ DROPS OPHTHALMIC at 08:32

## 2018-01-22 RX ADMIN — GENTAMICIN SULFATE 1 DROP: 3 SOLUTION/ DROPS OPHTHALMIC at 08:25

## 2018-01-22 RX ADMIN — TROPICAMIDE 1 DROP: 10 SOLUTION/ DROPS OPHTHALMIC at 08:19

## 2018-01-22 RX ADMIN — SODIUM CHLORIDE 25 ML/HR: 900 INJECTION, SOLUTION INTRAVENOUS at 08:19

## 2018-01-22 RX ADMIN — MIDAZOLAM HYDROCHLORIDE 0.5 MG: 1 INJECTION, SOLUTION INTRAMUSCULAR; INTRAVENOUS at 09:47

## 2018-01-22 RX ADMIN — GENTAMICIN SULFATE 1 DROP: 3 SOLUTION/ DROPS OPHTHALMIC at 08:20

## 2018-01-22 RX ADMIN — TROPICAMIDE 1 DROP: 10 SOLUTION/ DROPS OPHTHALMIC at 08:25

## 2018-01-22 RX ADMIN — TROPICAMIDE 1 DROP: 10 SOLUTION/ DROPS OPHTHALMIC at 08:32

## 2018-01-22 NOTE — ANESTHESIA PREPROCEDURE EVALUATION
Anesthetic History   No history of anesthetic complications            Review of Systems / Medical History  Patient summary reviewed, nursing notes reviewed and pertinent labs reviewed    Pulmonary  Within defined limits                 Neuro/Psych   Within defined limits           Cardiovascular    Hypertension              Exercise tolerance: >4 METS     GI/Hepatic/Renal  Within defined limits              Endo/Other    Diabetes: type 2         Other Findings   Comments: gout           Physical Exam    Airway  Mallampati: I  TM Distance: < 4 cm  Neck ROM: normal range of motion   Mouth opening: Normal     Cardiovascular    Rhythm: regular  Rate: normal         Dental    Dentition: Bridges     Pulmonary  Breath sounds clear to auscultation               Abdominal  GI exam deferred       Other Findings            Anesthetic Plan    ASA: 2  Anesthesia type: MAC          Induction: Intravenous  Anesthetic plan and risks discussed with: Patient      Milk at 6 am with meds

## 2018-01-22 NOTE — PERIOP NOTES
Jia Juan  7/22/1933  [id number]    Situation:  Verbal report given from:  VIRI Meza  Procedure: Procedure(s):  CATARACT EXTRACTION WITH INTRA OCULAR LENS IMPLANT LEFT EYE    Background:    Preoperative diagnosis: Age-related nuclear cataract of left eye [H25.12]    Postoperative diagnosis: Age-related nuclear cataract of left eye [H25.12]    :  Dr. Nahid Colindres    Assistant(s): Circ-1: Tilford Phalen  Scrub Tech-1: Jes Hall    Specimens: * No specimens in log *    Assessment:  Intra-procedure medications         Anesthesia gave intra-procedure sedation and medications, see anesthesia flow sheet     Intravenous fluids: LR@ KVO     Vital signs stable       Recommendation:    Permission to share finding with family or friend yes

## 2018-01-22 NOTE — BRIEF OP NOTE
BRIEF OPERATIVE NOTE    Date of Procedure: 1/22/2018   Preoperative Diagnosis: Age-related nuclear cataract of left eye [H25.12]  Postoperative Diagnosis: Age-related nuclear cataract of left eye [H25.12]    Procedure(s):  CATARACT EXTRACTION WITH INTRA OCULAR LENS IMPLANT LEFT EYE  Surgeon(s) and Role:     * Graham Saleh MD - Primary         Assistant Staff:       Surgical Staff:  Circ-1: Jacqueline Courtney  Scrub Tech-1: [employer]  Event Time In   Incision Start 0938   Incision Close 1014     Anesthesia: MAC   Estimated Blood Loss: none  Specimens: * No specimens in log *   Findings: cataract left eye  Complications: none  Implants:   Implant Name Type Inv.  Item Serial No.  Lot No. LRB No. Used Action   LENS IOL POST 1-PC 6X13 20.0 -- [employer] - [id number]   LENS IOL POST 1-PC 6X13 20.0 -- [employer] [id number] [employer]   Left 1 Implanted

## 2018-01-22 NOTE — OP NOTES
Preoperative Diagnosis: Nuclear Sclerotic cataract left eye H25.12  Postoperative Diagnosis: Nuclear Sclerotic cataract left eye H25.12  Procedure: Extracapsular cataract extraction with lens implant left eye  Anesthesia: MAC with local  Estimated Blood Loss: None  Complications: None  Specimens: None  Assistants: None    The patient's left eye was dilated with mydriacyl 1% and gentamicin 0.3% for 3 doses preoperatively. The patient was taken to the operating room and was given sedation. Tetracaine was given topically to the left eye, and the eye was prepped and draped in the usual manner for sterile eye surgery, including Betadine solution being dropped onto the conjunctiva at the beginning of the prep. The eyelashes were isolated with a plastic drape. A lid speculum was placed. A #15 blade was used to make a paracentesis at the 5:00 location. The eye was flushed with a lidocaine / epinephrine mixture (\"Shugarcaine\"). The eye was filled with Viscoat (Duovisc), and a crescent blade was used to make a 2.5 mm incision at the limbus temporally. This was dissected 2 mm into clear cornea, and the eye was entered with a 2.4 mm keratome. A 0.12 forceps was used for fixation during the procedure. A capsulorhexis flap was started with a cystotome, and this was completed 360 degrees with Utrata forceps. The capsular piece was removed. Parlier dissection was performed with the \"Shugarcaine\" mixture on a cannula. The lens nucleus was removed using phacoemulsification with a total phaco time of 1:53 minutes. The lens was cracked and manipulated with a Sinsky hook. Residual cortex was removed using irrigation / aspiration. The capsule remained intact. The capsule was refilled with Provisc (Duovisc). An Josiah Intraocular lens model [id number] power 20.0 was placed in a lens folding cartridge with Provisc. The lens was unfolded into the capsular bag. The lens centered well.   Residual Viscoat and Provisc were removed using I / A. The Resure wound sealant was used to close the incision. The eye was flushed with BSS through the paracentesis. Betadine solution was placed on the conjunctival surface at the end of the case. The eye was left soft and formed at the end of the case. The incision site was water tight. The speculum was removed, and a drop of timolol 0.5% and david/poly/dex ointment was placed on the eye followed by a shield. The patient tolerated the procedure well and is to follow-up in one day.

## 2018-01-22 NOTE — IP AVS SNAPSHOT
[address] [facility] 83. [phone] Patient: Myranda Laws MRN: [id number] PTM:4/17/4319 About your hospitalization You were admitted on:  January 22, 2018 You last received care in the:  [facility] You were discharged on:  January 22, 2018 Why you were hospitalized Your primary diagnosis was:  Not on File Follow-up Information Follow up With Details Comments Contact Info Sandra Madrid MD   [address] [facility] [place] Grumbles [id number] [phone] Your Scheduled Appointments Monday January 22, 2018 CATARACT EXTRACTION WITH INTRA OCULAR LENS IMPLANT with Sandar Collins MD  
[facility] AMB SURGERY UNIT (RI OR PRE ASSESSMENT) [address] [facility] 83. [phone] Monday January 29, 2018 CATARACT EXTRACTION WITH INTRA OCULAR LENS IMPLANT with MD SV[facility]NA [facility] SURGERY UNIT (RI OR PRE ASSESSMENT) [address] [facility] 83. [phone] Discharge Orders None A check jc indicates which time of day the medication should be taken. My Medications ASK your doctor about these medications Instructions Each Dose to Equal  
 Morning Noon Evening Bedtime  
 allopurinol 300 mg tablet Commonly known as:  Pheobe Price Your last dose was: Your next dose is: TAKE ONE TABLET BY MOUTH ONCE DAILY TO PREVENT GOUT  
     
   
   
   
  
 ALPRAZolam 0.5 mg tablet Commonly known as:  Callie Johnson Your last dose was: Your next dose is: TAKE ONE TABLET BY MOUTH TWICE DAILY AS NEEDED FOR ANXIETY  
     
   
   
   
  
 aspirin 325 mg tablet Commonly known as:  ASPIRIN Your last dose was: Your next dose is: Take 325 mg by mouth daily. 325 mg  
    
   
   
   
  
 atenolol 50 mg tablet Commonly known as:  TENORMIN Your last dose was: Your next dose is: TAKE ONE TABLET BY MOUTH TWICE DAILY FOR BLOOD PRESSURE  
     
   
   
   
  
 diphenoxylate-atropine 2.5-0.025 mg per tablet Commonly known as:  LOMOTIL Your last dose was: Your next dose is: Take 1 Tab by mouth four (4) times daily as needed for Diarrhea. Max Daily Amount: 4 Tabs. 1 Tab  
    
   
   
   
  
 furosemide 40 mg tablet Commonly known as:  LASIX Your last dose was: Your next dose is: One tab po daily as needed for fluid  
     
   
   
   
  
 hydroCHLOROthiazide 25 mg tablet Commonly known as:  HYDRODIURIL Your last dose was: Your next dose is: TAKE ONE TABLET BY MOUTH ONCE DAILY  
     
   
   
   
  
 lisinopril 5 mg tablet Commonly known as:  Finn Mosquera Your last dose was: Your next dose is: Take 1 Tab by mouth daily. For kidneys 5 mg MULTIPLE VITAMINS PO Your last dose was: Your next dose is: Take 1 Tab by mouth daily. 1 Tab  
    
   
   
   
  
 pravastatin 20 mg tablet Commonly known as:  PRAVACHOL Your last dose was: Your next dose is: TAKE ONE TABLET BY MOUTH ONCE DAILY AT  NIGHT  FOR  CHOLESTEROL  
     
   
   
   
  
 PRESERVISION AREDS 2 PO Your last dose was: Your next dose is: Take  by mouth daily. terazosin 10 mg capsule Commonly known as:  HYTRIN Your last dose was: Your next dose is: TAKE ONE CAPSULE BY MOUTH ONCE DAILY FOR BLOOD PRESSURE AND PROSTATE  
     
   
   
   
  
 triazolam 0.25 mg tablet Commonly known as:  Florinda Anaheim Your last dose was: Your next dose is: Take 1 Tab by mouth nightly as needed.  Max Daily Amount: 0.25 mg.  
 0.25 mg  
    
   
   
   
  
 VITAMIN D3 1,000 unit Cap Generic drug:  cholecalciferol Your last dose was: Your next dose is: Take 1 Tab by mouth daily. 1 Tab Discharge Instructions Tonie Lesch, MD [facility] [place] Phone: [phone]       Fax: [phone] If you are unable to keep appointment, kindly give 24 hours notice please. REMOVE PATCH 
START DROPS WHEN YOU GET HOME 
PUT PATCH BACK ON AT BEDTIME 1. DO NOT RUB the eye that was operated on. 2. Do not strain excessively. It is all right to bend as long as you do not strain. 3. It is safe to take a shower, wash your face, and wash your hair. Just keep the eye closed. 4. Do not swim for 1 week after surgery. 5. If you have any problems or questions, do not hesitate to call. There is always a physician on call at [phone] ext. [phone].  
6. Follow instructions on eye drops from office. 7. You may take Tylenol or Advil for discomfort. If it pressure not relieved by Tylenol or Advil, please call Dr. Hunter [facility] office. If you were given prescriptions, please review the written information on the prescribed medications. DO NOT DRIVE WHILE TAKING NARCOTIC PAIN MEDICATIONS. DISCHARGE SUMMARY from Nurse The following personal items collected during your admission are returned to you:  
Dental Appliance:   
Vision:   
Hearing Aid:   
Jewelry:   
Clothing:   
Other Valuables:   
Valuables sent to safe:   
 
PATIENT INSTRUCTIONS: 
 
After general anesthesia or intravenous sedation, for 24 hours or while taking prescription Narcotics: · Someone should be with you for the next 24 hours. · For your own safety, a responsible adult must drive you home. · Limit your activities · Recommended activity: Rest today, Do not climb stairs or shower unattended for the next 24 hours. · Do not drive and operate hazardous machinery · Do not make important personal or business decisions · Do  not drink alcoholic beverages · If you have not urinated within 8 hours after discharge, please contact your surgeon on call. Report the following to your surgeon: 
· Excessive pain, swelling, redness or odor of or around the surgical area · Temperature over 100.5 · Nausea and vomiting lasting longer than 4 hours or if unable to take medications · Any signs of decreased circulation or nerve impairment to extremity: change in color, persistent  numbness, tingling, coldness or increase pain ·  
·  
· You will receive a Post Operative Call from one of the Recovery Room Nurses on the day after your surgery to check on you. It is very important for us to know how you are recovering after your surgery. · You may receive an e-mail or letter in the mail from Francisco Javier regarding your experience with us in the Ambulatory Surgery Unit. Your feedback is valuable to us and we appreciate your participation in the survey. · If the above instructions are not adequate, please contact Jessica Russell RN, Shandra anesthesia Nurse Manager or our Anesthesiologist, at [phone]. ·  
· We wish youre a speedy recovery ? What to do at Home: *  Please give a list of your current medications to your Primary Care Provider. *  Please update this list whenever your medications are discontinued, doses are 
    changed, or new medications (including over-the-counter products) are added. *  Please carry medication information at all times in case of emergency situations. These are general instructions for a healthy lifestyle: No smoking/ No tobacco products/ Avoid exposure to second hand smoke Surgeon General's Warning:  Quitting smoking now greatly reduces serious risk to your health. Obesity, smoking, and sedentary lifestyle greatly increases your risk for illness A healthy diet, regular physical exercise & weight monitoring are important for maintaining a healthy lifestyle You may be retaining fluid if you have a history of heart failure or if you experience any of the following symptoms:  Weight gain of 3 pounds or more overnight or 5 pounds in a week, increased swelling in our hands or feet or shortness of breath while lying flat in bed. Please call your doctor as soon as you notice any of these symptoms; do not wait until your next office visit. Recognize signs and symptoms of STROKE: 
 
B - Balance E - Eyes F-face looks uneven A-arms unable to move or move even S-speech slurred or non-existent T-time-call 911 as soon as signs and symptoms begin-DO NOT go Back to bed or wait to see if you get better-TIME IS BRAIN. If you have not received your influenza and/or pneumococcal vaccine, please follow up with your primary care physician. The discharge information has been reviewed with the patient and caregiver. The patient and caregiver verbalized understanding. ACO Transitions of Care Introducing [employer] [address] Kirstin Yannick offers a voluntary care coordination program to provide high quality service and care to [facility] fee-for-service beneficiaries. [address] was designed to help you enhance your health and well-being through the following services: ? Transitions of Care  support for individuals who are transitioning from one care setting to another (example: Hospital to home). ? Chronic and Complex Care Coordination  support for individuals and caregivers of those with serious or chronic illnesses or with more than one chronic (ongoing) condition and those who take a number of different medications. If you meet specific medical criteria, a [facility] [address] may call you directly to coordinate your care with your primary care physician and your other care providers. For questions about the [facility] programs, please, contact your physicians office. For general questions or additional information about Accountable Care Organizations: 
Please visit www.medicare.gov/acos. html or call 1-800-MEDICARE (4-329.942.2408) TTY users should call 6-172.141.7988. Introducing [facility] & HEALTH SERVICES! Dear [facility]: 
Thank you for requesting a MediaPass account. Our records indicate that you have previously registered for a MediaPass account but its currently inactive. Please call our MediaPass support line at [phone]. Additional Information If you have questions, please visit the Frequently Asked Questions section of the Media[employer] website at [email or web address] Remember, rankurhart is NOT to be used for urgent needs. For medical emergencies, dial 911. Now available from your iPhone and Android! Providers Seen During Your Hospitalization Provider Specialty Primary office phone Dana Tripp MD Ophthalmology [phone] Your Primary Care Physician (PCP) Primary Care Physician Office Phone Office Fax Annita Hashimoto [phone] You are allergic to the following Allergen Reactions Bactrim (Sulfamethoprim Ds) Nausea Only Ciprofloxacin Other (comments) Nauseated, felt fuzzy Erythromycin Unknown (comments) Pcn (Penicillins) Swelling Recent Documentation Height Weight BMI Smoking Status 1.702 m 76.2 kg 26.31 kg/m2 Former Smoker Emergency Contacts Name Discharge Info Relation Home Work Mobile Angel Cedillo  Spouse [3] [phone] Patient Belongings The following personal items are in your possession at time of discharge: 
                             
 
  
  
 Please provide this summary of care documentation to your next provider. Signatures-by signing, you are acknowledging that this After Visit Summary has been reviewed with you and you have received a copy. Patient Signature:  ____________________________________________________________ Date:  ____________________________________________________________  
  
Mina Moritz Provider Signature:  ____________________________________________________________ Date:  ____________________________________________________________

## 2018-01-22 NOTE — DISCHARGE INSTRUCTIONS
Pineda Serrano MD  [facility] [address]  [place], [place] [facility]  Phone: [phone]       Fax: [phone]  If you are unable to keep appointment, kindly give 24 hours notice please. REMOVE PATCH  START DROPS WHEN YOU GET HOME  PUT PATCH BACK ON AT BEDTIME    1. DO NOT RUB the eye that was operated on. 2. Do not strain excessively. It is all right to bend as long as you do not strain. 3. It is safe to take a shower, wash your face, and wash your hair. Just keep the eye closed. 4. Do not swim for 1 week after surgery. 5. If you have any problems or questions, do not hesitate to call. There is always a physician on call at [phone] ext. [phone].   6. Follow instructions on eye drops from office. 7. You may take Tylenol or Advil for discomfort. If it pressure not relieved by Tylenol or Advil, please call Dr. Cao Kind office. If you were given prescriptions, please review the written information on the prescribed medications. DO NOT DRIVE WHILE TAKING NARCOTIC PAIN MEDICATIONS. DISCHARGE SUMMARY from Nurse    The following personal items collected during your admission are returned to you:   Dental Appliance:    Vision:    Hearing Aid:    Jewelry:    Clothing:    Other Valuables:    Valuables sent to safe:      PATIENT INSTRUCTIONS:    After general anesthesia or intravenous sedation, for 24 hours or while taking prescription Narcotics:  · Someone should be with you for the next 24 hours. · For your own safety, a responsible adult must drive you home. · Limit your activities  · Recommended activity: Rest today, Do not climb stairs or shower unattended for the next 24 hours. · Do not drive and operate hazardous machinery  · Do not make important personal or business decisions  · Do  not drink alcoholic beverages  · If you have not urinated within 8 hours after discharge, please contact your surgeon on call.     Report the following to your surgeon:  · Excessive pain, swelling, redness or odor of or around the surgical area  · Temperature over 100.5  · Nausea and vomiting lasting longer than 4 hours or if unable to take medications  · Any signs of decreased circulation or nerve impairment to extremity: change in color, persistent  numbness, tingling, coldness or increase pain  ·   ·   · You will receive a Post Operative Call from one of the Recovery Room Nurses on the day after your surgery to check on you. It is very important for us to know how you are recovering after your surgery. · You may receive an e-mail or letter in the mail from [employer] regarding your experience with us in the Ambulatory Surgery Unit. Your feedback is valuable to us and we appreciate your participation in the survey. · If the above instructions are not adequate, please contact Karen Lainez RN, Shandra anesthesia Nurse Manager or our Anesthesiologist, at [phone]. ·   · We wish youre a speedy recovery ? What to do at Home:      *  Please give a list of your current medications to your Primary Care Provider. *  Please update this list whenever your medications are discontinued, doses are      changed, or new medications (including over-the-counter products) are added. *  Please carry medication information at all times in case of emergency situations. These are general instructions for a healthy lifestyle:    No smoking/ No tobacco products/ Avoid exposure to second hand smoke    Surgeon General's Warning:  Quitting smoking now greatly reduces serious risk to your health.     Obesity, smoking, and sedentary lifestyle greatly increases your risk for illness    A healthy diet, regular physical exercise & weight monitoring are important for maintaining a healthy lifestyle    You may be retaining fluid if you have a history of heart failure or if you experience any of the following symptoms:  Weight gain of 3 pounds or more overnight or 5 pounds in a week, increased swelling in our hands or feet or shortness of breath while lying flat in bed. Please call your doctor as soon as you notice any of these symptoms; do not wait until your next office visit. Recognize signs and symptoms of STROKE:    B - Balance  E - Eyes    F-face looks uneven    A-arms unable to move or move even    S-speech slurred or non-existent    T-time-call 911 as soon as signs and symptoms begin-DO NOT go       Back to bed or wait to see if you get better-TIME IS BRAIN. If you have not received your influenza and/or pneumococcal vaccine, please follow up with your primary care physician. The discharge information has been reviewed with the patient and caregiver. The patient and caregiver verbalized understanding.

## 2018-02-02 ENCOUNTER — OFFICE VISIT (OUTPATIENT)
Dept: FAMILY MEDICINE CLINIC | Age: 83
End: 2018-02-02

## 2018-02-02 VITALS
TEMPERATURE: 98.2 F | BODY MASS INDEX: 25.11 KG/M2 | DIASTOLIC BLOOD PRESSURE: 64 MMHG | SYSTOLIC BLOOD PRESSURE: 146 MMHG | HEART RATE: 73 BPM | HEIGHT: 67 IN | RESPIRATION RATE: 24 BRPM | WEIGHT: 160 LBS | OXYGEN SATURATION: 96 %

## 2018-02-02 DIAGNOSIS — J20.9 ACUTE BRONCHITIS, UNSPECIFIED ORGANISM: Primary | ICD-10-CM

## 2018-02-02 RX ORDER — PREDNISONE 10 MG/1
10 TABLET ORAL 2 TIMES DAILY
Qty: 10 TAB | Refills: 0 | Status: SHIPPED | OUTPATIENT
Start: 2018-02-02 | End: 2018-02-13 | Stop reason: ALTCHOICE

## 2018-02-02 RX ORDER — DOXYCYCLINE 100 MG/1
100 CAPSULE ORAL 2 TIMES DAILY
Qty: 14 CAP | Refills: 0 | Status: SHIPPED | OUTPATIENT
Start: 2018-02-02 | End: 2018-02-09

## 2018-02-02 RX ORDER — PROMETHAZINE HYDROCHLORIDE AND CODEINE PHOSPHATE 6.25; 1 MG/5ML; MG/5ML
1 SOLUTION ORAL
Qty: 180 ML | Refills: 1 | Status: SHIPPED | OUTPATIENT
Start: 2018-02-02 | End: 2018-08-24

## 2018-02-02 NOTE — MR AVS SNAPSHOT
[address] [facility] 
 
 
 [address] [facility] 7 [id number] 
[phone] Patient: Tina Wilkerson MRN: [id number] DPM:4/65/0948 Visit Information Date & Time Provider Department Dept. Phone Encounter #  
 2/2/2018  3:30 PM Alexa Toney, [address] [facility] [phone] [id number] Follow-up Instructions Return if symptoms worsen or fail to improve. Your Appointments 2/2/2018  3:30 PM  
ACUTE CARE with Alexa Toney MD  
[address]) Appt Note: pt says he has some symptoms of flu. Dr. Lashell Durham Pt. Pt walked in  
 [address] [facility] 7 [phone]  
[phone] [address] [facility] [address] 4/18/2018  9:00 AM  
ROUTINE CARE with Hugo Vega MD  
[address]) Appt Note: 6 mnth fu  
 [address] [place] [phone]  
[phone] [address] [facility] [address] Upcoming Health Maintenance Date Due  
 EYE EXAM RETINAL OR DILATED Q1 6/8/2017 HEMOGLOBIN A1C Q6M 4/18/2018 GLAUCOMA SCREENING Q2Y 6/8/2018 FOOT EXAM Q1 10/18/2018 MICROALBUMIN Q1 10/18/2018 LIPID PANEL Q1 10/18/2018 MEDICARE YEARLY EXAM 10/19/2018 DTaP/Tdap/Td series (2 - Td) 5/16/2026 Allergies as of 2/2/2018  Review Complete On: 2/2/2018 By: Jed Six Severity Noted Reaction Type Reactions Bactrim [Sulfamethoprim Ds]  11/17/2016    Nausea Only Ciprofloxacin  04/18/2014   Side Effect Other (comments) Nauseated, felt fuzzy Erythromycin  04/30/2010    Unknown (comments) Pcn [Penicillins]  04/30/2010    Swelling Current Immunizations  Reviewed on 8/2/2016 Name Date Influenza High Dose Vaccine PF 10/18/2017, 9/12/2016, 11/30/2015, 10/13/2014 Influenza Vaccine 11/18/2013 Influenza Vaccine Split 10/31/2012, 10/17/2011, 10/29/2010 TD Vaccine 8/28/2012 ZZZ-RETIRED (DO NOT USE) Pneumococcal Vaccine (Unspecified Type) 8/28/2012 Not reviewed this visit You Were Diagnosed With   
  
 Codes Comments Acute bronchitis, unspecified organism    -  Primary ICD-10-CM: J20.9 ICD-9-CM: 466.0 Vitals BP Pulse Temp Resp Height(growth percentile) Weight(growth percentile) 146/64 (BP 1 Location: Right arm, BP Patient Position: Sitting) 73 98.2 °F (36.8 °C) (Oral) 24 5' 7\" (1.702 m) 160 lb (72.6 kg) SpO2 BMI Smoking Status 96% 25.06 kg/m2 Former Smoker Vitals History BMI and BSA Data Body Mass Index Body Surface Area 25.06 kg/m 2 1.85 m 2 Preferred Pharmacy Pharmacy Name Phone [facility] [address], [address] [place] [address] Avenue [phone] Your Updated Medication List  
  
   
This list is accurate as of: 2/2/18  1:58 PM.  Always use your most recent med list.  
  
  
  
  
 allopurinol 300 mg tablet Commonly known as:  ZYLOPRIM  
TAKE ONE TABLET BY MOUTH ONCE DAILY TO PREVENT GOUT  
  
 ALPRAZolam 0.5 mg tablet Commonly known as:  XANAX  
TAKE ONE TABLET BY MOUTH TWICE DAILY AS NEEDED FOR ANXIETY  
  
 aspirin 325 mg tablet Commonly known as:  ASPIRIN Take 325 mg by mouth daily. atenolol 50 mg tablet Commonly known as:  TENORMIN  
TAKE ONE TABLET BY MOUTH TWICE DAILY FOR BLOOD PRESSURE  
  
 diphenoxylate-atropine 2.5-0.025 mg per tablet Commonly known as:  LOMOTIL Take 1 Tab by mouth four (4) times daily as needed for Diarrhea. Max Daily Amount: 4 Tabs. doxycycline 100 mg capsule Commonly known as:  VIBRAMYCIN Take 1 Cap by mouth two (2) times a day for 7 days. furosemide 40 mg tablet Commonly known as:  LASIX One tab po daily as needed for fluid  
  
 hydroCHLOROthiazide 25 mg tablet Commonly known as:  HYDRODIURIL  
TAKE ONE TABLET BY MOUTH ONCE DAILY lisinopril 5 mg tablet Commonly known as:  Nichole Echevarria Take 1 Tab by mouth daily. For kidneys MULTIPLE VITAMINS PO Take 1 Tab by mouth daily. pravastatin 20 mg tablet Commonly known as:  PRAVACHOL  
TAKE ONE TABLET BY MOUTH ONCE DAILY AT  NIGHT  FOR  CHOLESTEROL  
  
 predniSONE 10 mg tablet Commonly known as:  Robyn Mamadou Take 1 Tab by mouth two (2) times a day. PRESERVISION AREDS 2 PO Take  by mouth daily. promethazine-codeine 6.25-10 mg/5 mL syrup Commonly known as:  PHENERGAN with CODEINE Take 5 mL by mouth four (4) times daily as needed for Cough. Max Daily Amount: 20 mL. terazosin 10 mg capsule Commonly known as:  HYTRIN  
TAKE ONE CAPSULE BY MOUTH ONCE DAILY FOR BLOOD PRESSURE AND PROSTATE  
  
 triazolam 0.25 mg tablet Commonly known as:  Lang Spine Take 1 Tab by mouth nightly as needed. Max Daily Amount: 0.25 mg. VITAMIN D3 1,000 unit Cap Generic drug:  cholecalciferol Take 1 Tab by mouth daily. Prescriptions Printed Refills  
 promethazine-codeine (PHENERGAN WITH CODEINE) 6.25-10 mg/5 mL syrup 1 Sig: Take 5 mL by mouth four (4) times daily as needed for Cough. Max Daily Amount: 20 mL. Class: Print Route: Oral  
  
Prescriptions Sent to Pharmacy Refills  
 predniSONE (DELTASONE) 10 mg tablet 0 Sig: Take 1 Tab by mouth two (2) times a day. Class: Normal  
 Pharmacy: [address] [place] [address] Ph #: [phone] Route: Oral  
 doxycycline (VIBRAMYCIN) 100 mg capsule 0 Sig: Take 1 Cap by mouth two (2) times a day for 7 days. Class: Normal  
 Pharmacy: [facility] [address] Ph #: [phone] Route: Oral  
  
Follow-up Instructions Return if symptoms worsen or fail to improve. Introducing [facility] & HEALTH SERVICES! Dear Juan Phelps: 
Thank you for requesting a Conmiohart account.   Our records indicate that you have previously registered for a [employer] account but its currently inactive. Please call our [employer] support line at [phone]. Additional Information If you have questions, please visit the Frequently Asked Questions section of the [employer] website at [email or web address] Remember, [employer] is NOT to be used for urgent needs. For medical emergencies, dial 911. Now available from your iPhone and Android! Please provide this summary of care documentation to your next provider. Your primary care clinician is listed as Carrol Olvera. If you have any questions after today's visit, please call [phone].

## 2018-02-02 NOTE — PROGRESS NOTES
HISTORY OF PRESENT ILLNESS  Floyd Becker is a 80 y.o. male. 3 days head congestion,then chest congestion with wheezing and productive cough  Cold Symptoms   The history is provided by the patient. This is a new problem. The current episode started more than 2 days ago. The cough is productive of sputum. There has been no fever. Associated symptoms include rhinorrhea, sore throat, shortness of breath and wheezing. Review of Systems   HENT: Positive for rhinorrhea and sore throat. Respiratory: Positive for shortness of breath and wheezing. Physical Exam   Constitutional: He appears well-developed and well-nourished. HENT:   Head: Normocephalic and atraumatic. Nose: Mucosal edema and rhinorrhea present. Mouth/Throat: Posterior oropharyngeal erythema present. Eyes: Conjunctivae are normal. Pupils are equal, round, and reactive to light. Neck: Normal range of motion. Neck supple. Pulmonary/Chest: Effort normal.   Generalized diminished breathe sounds ,scattered ronchi     Abdominal: Soft. Bowel sounds are normal.   Skin: Skin is warm and dry. ASSESSMENT and PLAN  Diagnoses and all orders for this visit:    1. Acute bronchitis, unspecified organism  -     promethazine-codeine (PHENERGAN WITH CODEINE) 6.25-10 mg/5 mL syrup; Take 5 mL by mouth four (4) times daily as needed for Cough. Max Daily Amount: 20 mL. -     predniSONE (DELTASONE) 10 mg tablet; Take 1 Tab by mouth two (2) times a day. -     doxycycline (VIBRAMYCIN) 100 mg capsule; Take 1 Cap by mouth two (2) times a day for 7 days. Follow-up Disposition:  Return if symptoms worsen or fail to improve.

## 2018-02-02 NOTE — PROGRESS NOTES
Chief Complaint   Patient presents with    Cold Symptoms     Pt has cough, congestion, runny nose and throat feels tender but sore.

## 2018-02-12 ENCOUNTER — HOSPITAL ENCOUNTER (EMERGENCY)
Age: 83
Discharge: HOME | End: 2018-02-13
Attending: EMERGENCY MEDICINE
Payer: MEDICARE

## 2018-02-12 DIAGNOSIS — K40.90 UNILATERAL INGUINAL HERNIA WITHOUT OBSTRUCTION OR GANGRENE, RECURRENCE NOT SPECIFIED: ICD-10-CM

## 2018-02-12 DIAGNOSIS — R31.9 HEMATURIA, UNSPECIFIED TYPE: ICD-10-CM

## 2018-02-12 DIAGNOSIS — K57.30 DIVERTICULOSIS OF LARGE INTESTINE WITHOUT HEMORRHAGE: ICD-10-CM

## 2018-02-12 DIAGNOSIS — R10.9 ACUTE LEFT FLANK PAIN: Primary | ICD-10-CM

## 2018-02-12 DIAGNOSIS — R35.0 URINARY FREQUENCY: ICD-10-CM

## 2018-02-12 PROCEDURE — 96375 TX/PRO/DX INJ NEW DRUG ADDON: CPT

## 2018-02-12 PROCEDURE — 99284 EMERGENCY DEPT VISIT MOD MDM: CPT

## 2018-02-12 PROCEDURE — 85025 COMPLETE CBC W/AUTO DIFF WBC: CPT | Performed by: EMERGENCY MEDICINE

## 2018-02-12 PROCEDURE — 36415 COLL VENOUS BLD VENIPUNCTURE: CPT | Performed by: EMERGENCY MEDICINE

## 2018-02-12 PROCEDURE — 96361 HYDRATE IV INFUSION ADD-ON: CPT

## 2018-02-12 PROCEDURE — 96374 THER/PROPH/DIAG INJ IV PUSH: CPT

## 2018-02-12 PROCEDURE — 81001 URINALYSIS AUTO W/SCOPE: CPT | Performed by: EMERGENCY MEDICINE

## 2018-02-12 PROCEDURE — 80053 COMPREHEN METABOLIC PANEL: CPT | Performed by: EMERGENCY MEDICINE

## 2018-02-12 RX ORDER — KETOROLAC TROMETHAMINE 30 MG/ML
15 INJECTION, SOLUTION INTRAMUSCULAR; INTRAVENOUS
Status: COMPLETED | OUTPATIENT
Start: 2018-02-12 | End: 2018-02-13

## 2018-02-13 ENCOUNTER — APPOINTMENT (OUTPATIENT)
Dept: CT IMAGING | Age: 83
End: 2018-02-13
Attending: EMERGENCY MEDICINE
Payer: MEDICARE

## 2018-02-13 ENCOUNTER — OFFICE VISIT (OUTPATIENT)
Dept: FAMILY MEDICINE CLINIC | Age: 83
End: 2018-02-13

## 2018-02-13 VITALS
BODY MASS INDEX: 24.64 KG/M2 | HEIGHT: 67 IN | WEIGHT: 157 LBS | RESPIRATION RATE: 16 BRPM | TEMPERATURE: 97.4 F | DIASTOLIC BLOOD PRESSURE: 58 MMHG | HEART RATE: 56 BPM | OXYGEN SATURATION: 99 % | SYSTOLIC BLOOD PRESSURE: 149 MMHG

## 2018-02-13 VITALS
HEIGHT: 67 IN | SYSTOLIC BLOOD PRESSURE: 149 MMHG | OXYGEN SATURATION: 92 % | RESPIRATION RATE: 16 BRPM | HEART RATE: 72 BPM | DIASTOLIC BLOOD PRESSURE: 76 MMHG | TEMPERATURE: 98 F | BODY MASS INDEX: 24.78 KG/M2 | WEIGHT: 157.85 LBS

## 2018-02-13 DIAGNOSIS — R97.20 ELEVATED PSA: ICD-10-CM

## 2018-02-13 DIAGNOSIS — N40.1 BENIGN PROSTATIC HYPERPLASIA WITH URINARY FREQUENCY: Primary | ICD-10-CM

## 2018-02-13 DIAGNOSIS — N13.30 HYDRONEPHROSIS, RIGHT: ICD-10-CM

## 2018-02-13 DIAGNOSIS — R35.0 BENIGN PROSTATIC HYPERPLASIA WITH URINARY FREQUENCY: Primary | ICD-10-CM

## 2018-02-13 LAB
ALBUMIN SERPL-MCNC: 3.9 G/DL (ref 3.5–5)
ALBUMIN/GLOB SERPL: 1.3 {RATIO} (ref 1.1–2.2)
ALP SERPL-CCNC: 140 U/L (ref 45–117)
ALT SERPL-CCNC: 18 U/L (ref 12–78)
ANION GAP SERPL CALC-SCNC: 7 MMOL/L (ref 5–15)
APPEARANCE UR: CLEAR
AST SERPL-CCNC: 13 U/L (ref 15–37)
BACTERIA URNS QL MICRO: NEGATIVE /HPF
BASOPHILS # BLD: 0 K/UL (ref 0–0.1)
BASOPHILS NFR BLD: 0 % (ref 0–1)
BILIRUB SERPL-MCNC: 1 MG/DL (ref 0.2–1)
BILIRUB UR QL: NEGATIVE
BUN SERPL-MCNC: 26 MG/DL (ref 6–20)
BUN/CREAT SERPL: 23 (ref 12–20)
CALCIUM SERPL-MCNC: 9 MG/DL (ref 8.5–10.1)
CHLORIDE SERPL-SCNC: 106 MMOL/L (ref 97–108)
CO2 SERPL-SCNC: 24 MMOL/L (ref 21–32)
COLOR UR: ABNORMAL
CREAT SERPL-MCNC: 1.14 MG/DL (ref 0.7–1.3)
DIFFERENTIAL METHOD BLD: ABNORMAL
EOSINOPHIL # BLD: 0 K/UL (ref 0–0.4)
EOSINOPHIL NFR BLD: 0 % (ref 0–7)
EPITH CASTS URNS QL MICRO: ABNORMAL /LPF
ERYTHROCYTE [DISTWIDTH] IN BLOOD BY AUTOMATED COUNT: 14.4 % (ref 11.5–14.5)
GLOBULIN SER CALC-MCNC: 3 G/DL (ref 2–4)
GLUCOSE SERPL-MCNC: 183 MG/DL (ref 65–100)
GLUCOSE UR STRIP.AUTO-MCNC: NEGATIVE MG/DL
HCT VFR BLD AUTO: 34.9 % (ref 36.6–50.3)
HGB BLD-MCNC: 12.2 G/DL (ref 12.1–17)
HGB UR QL STRIP: ABNORMAL
HYALINE CASTS URNS QL MICRO: ABNORMAL /LPF (ref 0–5)
IMM GRANULOCYTES # BLD: 0.1 K/UL (ref 0–0.04)
IMM GRANULOCYTES NFR BLD AUTO: 1 % (ref 0–0.5)
KETONES UR QL STRIP.AUTO: NEGATIVE MG/DL
LEUKOCYTE ESTERASE UR QL STRIP.AUTO: NEGATIVE
LYMPHOCYTES # BLD: 0.6 K/UL (ref 0.8–3.5)
LYMPHOCYTES NFR BLD: 6 % (ref 12–49)
MCH RBC QN AUTO: 29 PG (ref 26–34)
MCHC RBC AUTO-ENTMCNC: 35 G/DL (ref 30–36.5)
MCV RBC AUTO: 82.9 FL (ref 80–99)
MONOCYTES # BLD: 0.4 K/UL (ref 0–1)
MONOCYTES NFR BLD: 4 % (ref 5–13)
NEUTS SEG # BLD: 8.3 K/UL (ref 1.8–8)
NEUTS SEG NFR BLD: 89 % (ref 32–75)
NITRITE UR QL STRIP.AUTO: NEGATIVE
NRBC # BLD: 0 K/UL (ref 0–0.01)
NRBC BLD-RTO: 0 PER 100 WBC
PH UR STRIP: 6.5 [PH] (ref 5–8)
PLATELET # BLD AUTO: 111 K/UL (ref 150–400)
PMV BLD AUTO: 11.5 FL (ref 8.9–12.9)
POTASSIUM SERPL-SCNC: 4.1 MMOL/L (ref 3.5–5.1)
PROT SERPL-MCNC: 6.9 G/DL (ref 6.4–8.2)
PROT UR STRIP-MCNC: 30 MG/DL
RBC # BLD AUTO: 4.21 M/UL (ref 4.1–5.7)
RBC #/AREA URNS HPF: ABNORMAL /HPF (ref 0–5)
RBC MORPH BLD: ABNORMAL
SODIUM SERPL-SCNC: 137 MMOL/L (ref 136–145)
SP GR UR REFRACTOMETRY: 1.01 (ref 1–1.03)
UA: UC IF INDICATED,UAUC: ABNORMAL
UROBILINOGEN UR QL STRIP.AUTO: 0.2 EU/DL (ref 0.2–1)
WBC # BLD AUTO: 9.4 K/UL (ref 4.1–11.1)
WBC URNS QL MICRO: ABNORMAL /HPF (ref 0–4)

## 2018-02-13 PROCEDURE — 74176 CT ABD & PELVIS W/O CONTRAST: CPT

## 2018-02-13 PROCEDURE — 74011250636 HC RX REV CODE- 250/636: Performed by: EMERGENCY MEDICINE

## 2018-02-13 RX ORDER — SULFAMETHOXAZOLE AND TRIMETHOPRIM 800; 160 MG/1; MG/1
1 TABLET ORAL 2 TIMES DAILY
Qty: 20 TAB | Refills: 0 | Status: SHIPPED | OUTPATIENT
Start: 2018-02-13 | End: 2018-02-23

## 2018-02-13 RX ORDER — FINASTERIDE 5 MG/1
5 TABLET, FILM COATED ORAL DAILY
Qty: 30 TAB | Refills: 12 | Status: SHIPPED | OUTPATIENT
Start: 2018-02-13

## 2018-02-13 RX ORDER — FENTANYL CITRATE 50 UG/ML
50 INJECTION, SOLUTION INTRAMUSCULAR; INTRAVENOUS ONCE
Status: COMPLETED | OUTPATIENT
Start: 2018-02-13 | End: 2018-02-13

## 2018-02-13 RX ADMIN — SODIUM CHLORIDE 500 ML: 900 INJECTION, SOLUTION INTRAVENOUS at 00:08

## 2018-02-13 RX ADMIN — FENTANYL CITRATE 50 MCG: 50 INJECTION, SOLUTION INTRAMUSCULAR; INTRAVENOUS at 01:38

## 2018-02-13 RX ADMIN — KETOROLAC TROMETHAMINE 15 MG: 30 INJECTION, SOLUTION INTRAMUSCULAR at 00:08

## 2018-02-13 NOTE — PROGRESS NOTES
HISTORY OF PRESENT ILLNESS  Asim Tinoco is a 80 y.o. male. HPI Comes in with wife Citlali. Woke up with severe pains in lower abdomen, was unable to pass water. Was also having diarrhea. Was having to void every 5 minutes. Was having problems intermittently throughout the day, eventually went to er. Has had 2 normal prostate biopsies. ROS    Physical Exam   Constitutional: He appears well-developed and well-nourished. HENT:   Right Ear: External ear normal.   Left Ear: External ear normal.   Mouth/Throat: Oropharynx is clear and moist.   Neck: No thyromegaly present. Cardiovascular: Normal rate, regular rhythm, normal heart sounds and intact distal pulses. Pulmonary/Chest: Effort normal and breath sounds normal. No respiratory distress. He has no wheezes. Abdominal: Soft. Bowel sounds are normal. He exhibits no distension and no mass. There is no tenderness. There is no guarding. Musculoskeletal: Normal range of motion. He exhibits no edema. Lymphadenopathy:     He has no cervical adenopathy. Nursing note and vitals reviewed. ASSESSMENT and PLAN  Orders Placed This Encounter    CULTURE, URINE    REFERRAL TO UROLOGY     Referral Priority:   Routine     Referral Type:   Consultation     Referral Reason:   Specialty Services Required    finasteride (PROSCAR) 5 mg tablet     Sig: Take 1 Tab by mouth daily. For prostate     Dispense:  30 Tab     Refill:  12    trimethoprim-sulfamethoxazole (BACTRIM DS, SEPTRA DS) 160-800 mg per tablet     Sig: Take 1 Tab by mouth two (2) times a day for 10 days. Dispense:  20 Tab     Refill:  0     Orders Placed This Encounter    CULTURE, URINE    REFERRAL TO UROLOGY    finasteride (PROSCAR) 5 mg tablet    trimethoprim-sulfamethoxazole (BACTRIM DS, SEPTRA DS) 160-800 mg per tablet     Diagnoses and all orders for this visit:    1.  Benign prostatic hyperplasia with urinary frequency  -     CULTURE, URINE  -     finasteride (PROSCAR) 5 mg tablet; Take 1 Tab by mouth daily. For prostate  -     trimethoprim-sulfamethoxazole (BACTRIM DS, SEPTRA DS) 160-800 mg per tablet;  Take 1 Tab by mouth two (2) times a day for 10 days.  -     REFERRAL TO UROLOGY    2. Hydronephrosis, right  -     REFERRAL TO UROLOGY    3. Elevated PSA  -     REFERRAL TO UROLOGY

## 2018-02-13 NOTE — DISCHARGE INSTRUCTIONS
Hernia: Care Instructions  Your Care Instructions    A hernia develops when tissue bulges through a weak spot in the wall of your belly. The groin area and the navel are common areas for a hernia. A hernia can also develop near the area of a surgery you had before. Pressure from lifting, straining, or coughing can tear the weak area, causing the hernia to bulge and be painful. If you cannot push a hernia back into place, the tissue may become trapped outside the belly wall. If the hernia gets twisted and loses its blood supply, it will swell and die. This is called a strangulated hernia. It usually causes a lot of pain. It needs treatment right away. Some hernias need to be repaired to prevent a strangulated hernia. If your hernia causes symptoms or is large, you may need surgery. Follow-up care is a key part of your treatment and safety. Be sure to make and go to all appointments, and call your doctor if you are having problems. It's also a good idea to know your test results and keep a list of the medicines you take. How can you care for yourself at home? · Take care when lifting heavy objects. · Stay at a healthy weight. · Do not smoke. Smoking can cause coughing, which can cause your hernia to bulge. If you need help quitting, talk to your doctor about stop-smoking programs and medicines. These can increase your chances of quitting for good. · Talk with your doctor before wearing a corset or truss for a hernia. These devices are not recommended for treating hernias and sometimes can do more harm than good. There may be certain situations when your doctor thinks a truss would work, but these are rare. When should you call for help? Call your doctor now or seek immediate medical care if:  ? · You have new or worse belly pain. ? · You are vomiting. ? · You cannot pass stools or gas. ? · You cannot push the hernia back into place with gentle pressure when you are lying down.    ? · The area over the hernia turns red or becomes tender. ? Watch closely for changes in your health, and be sure to contact your doctor if you have any problems. Where can you learn more? Go to http://bird-radha.info/. Enter C129 in the search box to learn more about \"Hernia: Care Instructions. \"  Current as of: May 12, 2017  Content Version: 11.4  © 9853-3981 [employer]. Care instructions adapted under license by [employer] (which disclaims liability or warranty for this information). If you have questions about a medical condition or this instruction, always ask your healthcare professional. [place] [address] any warranty or liability for your use of this information. Blood in the Urine: Care Instructions  Your Care Instructions    Blood in the urine, or hematuria, may make the urine look red, brown, or pink. There may be blood every time you urinate or just from time to time. You cannot always see blood in the urine, but it will show up in a urine test.  Blood in the urine may be serious. It should always be checked by a doctor. Your doctor may recommend more tests, including an X-ray, a CT scan, or a cystoscopy (which lets a doctor look inside the urethra and bladder). Blood in the urine can be a sign of another problem. Common causes are bladder infections and kidney stones. An injury to your groin or your genital area can also cause bleeding in the urinary tract. Very hard exercise-such as running a marathon-can cause blood in the urine. Blood in the urine can also be a sign of kidney disease or cancer in the bladder or kidney. Many cases of blood in the urine are caused by a harmless condition that runs in families. This is called benign familial hematuria. It does not need any treatment. Sometimes your urine may look red or brown even though it does not contain blood.  For example, not getting enough fluids (dehydration), taking certain medicines, or having a liver problem can change the color of your urine. Eating foods such as beets, rhubarb, or blackberries or foods with red food coloring can make your urine look red or pink. Follow-up care is a key part of your treatment and safety. Be sure to make and go to all appointments, and call your doctor if you are having problems. It's also a good idea to know your test results and keep a list of the medicines you take. When should you call for help? Call your doctor now or seek immediate medical care if:  · You have symptoms of a urinary infection. For example:  ¨ You have pus in your urine. ¨ You have pain in your back just below your rib cage. This is called flank pain. ¨ You have a fever, chills, or body aches. ¨ It hurts to urinate. ¨ You have groin or belly pain. · You have more blood in your urine. Watch closely for changes in your health, and be sure to contact your doctor if:  · You have new urination problems. · You do not get better as expected. Where can you learn more? Go to http://bird-radha.info/. Enter I557 in the search box to learn more about \"Blood in the Urine: Care Instructions. \"  Current as of: May 12, 2017  Content Version: 11.4  © 3702-3744 [employer]. Care instructions adapted under license by [employer] (which disclaims liability or warranty for this information). If you have questions about a medical condition or this instruction, always ask your healthcare professional. [place] [address] any warranty or liability for your use of this information. Inguinal Hernia: Care Instructions  Your Care Instructions    An inguinal hernia occurs when tissue bulges through a weak spot in your groin area. You may see or feel a tender bulge in the groin or scrotum. You may also have pain, pressure or burning, or a feeling that something has \"given way. \"  Hernias are caused by a weakness in the belly wall. The bulge or discomfort may occur after heavy lifting, straining, or coughing. Hernias do not heal on their own, and they tend to get worse over time. If your hernia does not bother you, you most likely can wait to have surgery. Your hernia may get worse, but it may not. In some cases, hernias that are small and painless may never need to be repaired. Follow-up care is a key part of your treatment and safety. Be sure to make and go to all appointments, and call your doctor if you are having problems. It's also a good idea to know your test results and keep a list of the medicines you take. How can you care for yourself at home? · Take pain medicines exactly as directed. ¨ If the doctor gave you a prescription medicine for pain, take it as prescribed. ¨ If you are not taking a prescription pain medicine, ask your doctor if you can take an over-the-counter medicine. · Use proper lifting techniques, and avoid heavy lifting if you can. To lift things more safely, bend your knees and let your arms and legs do the work. Keep your back straight, and do not bend over at the waist. Keep the load as close to your body as you can. Move your feet instead of turning or twisting your body. · Lose weight if you are overweight. · Include fruits, vegetables, legumes, and whole grains in your diet each day. These foods are high in fiber and will make it easier to avoid straining during bowel movements. · Do not smoke. Smoking can cause coughing, which can cause your hernia to bulge. If you need help quitting, talk to your doctor about stop-smoking programs and medicines. These can increase your chances of quitting for good. When should you call for help? Call your doctor now or seek immediate medical care if:  ? · You have new or worse belly pain. ? · You are vomiting. ? · You cannot pass stools or gas. ? · You cannot push the hernia back into place with gentle pressure when you are lying down.    ? · The area over the hernia turns red or becomes tender. ? Watch closely for changes in your health, and be sure to contact your doctor if you have any problems. Where can you learn more? Go to http://bird-radha.info/. Enter N434 in the search box to learn more about \"Inguinal Hernia: Care Instructions. \"  Current as of: May 12, 2017  Content Version: 11.4  © 4668-7875 [employer]. Care instructions adapted under license by [employer] (which disclaims liability or warranty for this information). If you have questions about a medical condition or this instruction, always ask your healthcare professional. [place] [address] any warranty or liability for your use of this information. Abdominal Pain: Care Instructions  Your Care Instructions    Abdominal pain has many possible causes. Some aren't serious and get better on their own in a few days. Others need more testing and treatment. If your pain continues or gets worse, you need to be rechecked and may need more tests to find out what is wrong. You may need surgery to correct the problem. Don't ignore new symptoms, such as fever, nausea and vomiting, urination problems, pain that gets worse, and dizziness. These may be signs of a more serious problem. Your doctor may have recommended a follow-up visit in the next 8 to 12 hours. If you are not getting better, you may need more tests or treatment. The doctor has checked you carefully, but problems can develop later. If you notice any problems or new symptoms, get medical treatment right away. Follow-up care is a key part of your treatment and safety. Be sure to make and go to all appointments, and call your doctor if you are having problems. It's also a good idea to know your test results and keep a list of the medicines you take. How can you care for yourself at home? · Rest until you feel better.   · To prevent dehydration, drink plenty of fluids, enough so that your urine is light yellow or clear like water. Choose water and other caffeine-free clear liquids until you feel better. If you have kidney, heart, or liver disease and have to limit fluids, talk with your doctor before you increase the amount of fluids you drink. · If your stomach is upset, eat mild foods, such as rice, dry toast or crackers, bananas, and applesauce. Try eating several small meals instead of two or three large ones. · Wait until 48 hours after all symptoms have gone away before you have spicy foods, alcohol, and drinks that contain caffeine. · Do not eat foods that are high in fat. · Avoid anti-inflammatory medicines such as aspirin, ibuprofen (Advil, Motrin), and naproxen (Aleve). These can cause stomach upset. Talk to your doctor if you take daily aspirin for another health problem. When should you call for help? Call 911 anytime you think you may need emergency care. For example, call if:  ? · You passed out (lost consciousness). ? · You pass maroon or very bloody stools. ? · You vomit blood or what looks like coffee grounds. ? · You have new, severe belly pain. ?Call your doctor now or seek immediate medical care if:  ? · Your pain gets worse, especially if it becomes focused in one area of your belly. ? · You have a new or higher fever. ? · Your stools are black and look like tar, or they have streaks of blood. ? · You have unexpected vaginal bleeding. ? · You have symptoms of a urinary tract infection. These may include:  ¨ Pain when you urinate. ¨ Urinating more often than usual.  ¨ Blood in your urine. ? · You are dizzy or lightheaded, or you feel like you may faint. ? Watch closely for changes in your health, and be sure to contact your doctor if:  ? · You are not getting better after 1 day (24 hours). Where can you learn more? Go to http://bird-radha.info/.   Enter P905 in the search box to learn more about \"Abdominal Pain: Care Instructions. \"  Current as of: March 20, 2017  Content Version: 11.4  © 7503-1042 [email or web address]. Care instructions adapted under license by [employer] (which disclaims liability or warranty for this information). If you have questions about a medical condition or this instruction, always ask your healthcare professional. [address] any warranty or liability for your use of this information. Diverticulosis: Care Instructions  Your Care Instructions  In diverticulosis, pouches called diverticula form in the wall of the large intestine (colon). The pouches do not cause any pain or other symptoms. Most people who have diverticulosis do not know they have it. But the pouches sometimes bleed, and if they become infected, they can cause pain and other symptoms. When this happens, it is called diverticulitis. Diverticula form when pressure pushes the wall of the colon outward at certain weak points. A diet that is too low in fiber can cause diverticula. Follow-up care is a key part of your treatment and safety. Be sure to make and go to all appointments, and call your doctor if you are having problems. It's also a good idea to know your test results and keep a list of the medicines you take. How can you care for yourself at home? · Include fruits, leafy green vegetables, beans, and whole grains in your diet each day. These foods are high in fiber. · Take a fiber supplement, such as Citrucel or Metamucil, every day if needed. Read and follow all instructions on the label. · Drink plenty of fluids, enough so that your urine is light yellow or clear like water. If you have kidney, heart, or liver disease and have to limit fluids, talk with your doctor before you increase the amount of fluids you drink. · Get at least 30 minutes of exercise on most days of the week. Walking is a good choice.  You also may want to do other activities, such as running, swimming, cycling, or playing tennis or team sports. · Cut out foods that cause gas, pain, or other symptoms. When should you call for help? Call your doctor now or seek immediate medical care if:  ? · You have belly pain. ? · You pass maroon or very bloody stools. ? · You have a fever. ? · You have nausea and vomiting. ? · You have unusual changes in your bowel movements or abdominal swelling. ? · You have burning pain when you urinate. ? · You have abnormal vaginal discharge. ? · You have shoulder pain. ? · You have cramping pain that does not get better when you have a bowel movement or pass gas. ? · You pass gas or stool from your urethra while urinating. ? Watch closely for changes in your health, and be sure to contact your doctor if you have any problems. Where can you learn more? Go to http://bird-radha.info/. Enter F738 in the search box to learn more about \"Diverticulosis: Care Instructions. \"  Current as of: May 12, 2017  Content Version: 11.4  © 0400-4224 [employer]. Care instructions adapted under license by [employer] (which disclaims liability or warranty for this information). If you have questions about a medical condition or this instruction, always ask your healthcare professional. [address] any warranty or liability for your use of this information. Inguinal Hernia: Care Instructions  Your Care Instructions    An inguinal hernia occurs when tissue bulges through a weak spot in your groin area. You may see or feel a tender bulge in the groin or scrotum. You may also have pain, pressure or burning, or a feeling that something has \"given way. \"  Hernias are caused by a weakness in the belly wall. The bulge or discomfort may occur after heavy lifting, straining, or coughing. Hernias do not heal on their own, and they tend to get worse over time.   If your hernia does not bother you, you most likely can wait to have surgery. Your hernia may get worse, but it may not. In some cases, hernias that are small and painless may never need to be repaired. Follow-up care is a key part of your treatment and safety. Be sure to make and go to all appointments, and call your doctor if you are having problems. It's also a good idea to know your test results and keep a list of the medicines you take. How can you care for yourself at home? · Take pain medicines exactly as directed. ¨ If the doctor gave you a prescription medicine for pain, take it as prescribed. ¨ If you are not taking a prescription pain medicine, ask your doctor if you can take an over-the-counter medicine. · Use proper lifting techniques, and avoid heavy lifting if you can. To lift things more safely, bend your knees and let your arms and legs do the work. Keep your back straight, and do not bend over at the waist. Keep the load as close to your body as you can. Move your feet instead of turning or twisting your body. · Lose weight if you are overweight. · Include fruits, vegetables, legumes, and whole grains in your diet each day. These foods are high in fiber and will make it easier to avoid straining during bowel movements. · Do not smoke. Smoking can cause coughing, which can cause your hernia to bulge. If you need help quitting, talk to your doctor about stop-smoking programs and medicines. These can increase your chances of quitting for good. When should you call for help? Call your doctor now or seek immediate medical care if:  ? · You have new or worse belly pain. ? · You are vomiting. ? · You cannot pass stools or gas. ? · You cannot push the hernia back into place with gentle pressure when you are lying down. ? · The area over the hernia turns red or becomes tender. ? Watch closely for changes in your health, and be sure to contact your doctor if you have any problems. Where can you learn more?   Go to http://bird-radha.info/. Enter C289 in the search box to learn more about \"Inguinal Hernia: Care Instructions. \"  Current as of: May 12, 2017  Content Version: 11.4  © 6367-7948 [employer]. Care instructions adapted under license by [employer] (which disclaims liability or warranty for this information). If you have questions about a medical condition or this instruction, always ask your healthcare professional. [address] any warranty or liability for your use of this information. Diverticulosis: Care Instructions  Your Care Instructions  In diverticulosis, pouches called diverticula form in the wall of the large intestine (colon). The pouches do not cause any pain or other symptoms. Most people who have diverticulosis do not know they have it. But the pouches sometimes bleed, and if they become infected, they can cause pain and other symptoms. When this happens, it is called diverticulitis. Diverticula form when pressure pushes the wall of the colon outward at certain weak points. A diet that is too low in fiber can cause diverticula. Follow-up care is a key part of your treatment and safety. Be sure to make and go to all appointments, and call your doctor if you are having problems. It's also a good idea to know your test results and keep a list of the medicines you take. How can you care for yourself at home? · Include fruits, leafy green vegetables, beans, and whole grains in your diet each day. These foods are high in fiber. · Take a fiber supplement, such as Citrucel or Metamucil, every day if needed. Read and follow all instructions on the label. · Drink plenty of fluids, enough so that your urine is light yellow or clear like water. If you have kidney, heart, or liver disease and have to limit fluids, talk with your doctor before you increase the amount of fluids you drink.   · Get at least 30 minutes of exercise on most days of the week. Walking is a good choice. You also may want to do other activities, such as running, swimming, cycling, or playing tennis or team sports. · Cut out foods that cause gas, pain, or other symptoms. When should you call for help? Call your doctor now or seek immediate medical care if:  ? · You have belly pain. ? · You pass maroon or very bloody stools. ? · You have a fever. ? · You have nausea and vomiting. ? · You have unusual changes in your bowel movements or abdominal swelling. ? · You have burning pain when you urinate. ? · You have abnormal vaginal discharge. ? · You have shoulder pain. ? · You have cramping pain that does not get better when you have a bowel movement or pass gas. ? · You pass gas or stool from your urethra while urinating. ? Watch closely for changes in your health, and be sure to contact your doctor if you have any problems. Where can you learn more? Go to http://bird-radha.info/. Enter D120 in the search box to learn more about \"Diverticulosis: Care Instructions. \"  Current as of: May 12, 2017  Content Version: 11.4  © 4337-7065 [employer]. Care instructions adapted under license by [employer] (which disclaims liability or warranty for this information). If you have questions about a medical condition or this instruction, always ask your healthcare professional. [place] [address] any warranty or liability for your use of this information. Hernia: Care Instructions  Your Care Instructions    A hernia develops when tissue bulges through a weak spot in the wall of your belly. The groin area and the navel are common areas for a hernia. A hernia can also develop near the area of a surgery you had before. Pressure from lifting, straining, or coughing can tear the weak area, causing the hernia to bulge and be painful.   If you cannot push a hernia back into place, the tissue may become trapped outside the belly wall. If the hernia gets twisted and loses its blood supply, it will swell and die. This is called a strangulated hernia. It usually causes a lot of pain. It needs treatment right away. Some hernias need to be repaired to prevent a strangulated hernia. If your hernia causes symptoms or is large, you may need surgery. Follow-up care is a key part of your treatment and safety. Be sure to make and go to all appointments, and call your doctor if you are having problems. It's also a good idea to know your test results and keep a list of the medicines you take. How can you care for yourself at home? · Take care when lifting heavy objects. · Stay at a healthy weight. · Do not smoke. Smoking can cause coughing, which can cause your hernia to bulge. If you need help quitting, talk to your doctor about stop-smoking programs and medicines. These can increase your chances of quitting for good. · Talk with your doctor before wearing a corset or truss for a hernia. These devices are not recommended for treating hernias and sometimes can do more harm than good. There may be certain situations when your doctor thinks a truss would work, but these are rare. When should you call for help? Call your doctor now or seek immediate medical care if:  ? · You have new or worse belly pain. ? · You are vomiting. ? · You cannot pass stools or gas. ? · You cannot push the hernia back into place with gentle pressure when you are lying down. ? · The area over the hernia turns red or becomes tender. ? Watch closely for changes in your health, and be sure to contact your doctor if you have any problems. Where can you learn more? Go to http://bird-radha.info/. Enter C129 in the search box to learn more about \"Hernia: Care Instructions. \"  Current as of: May 12, 2017  Content Version: 11.4  © 4323-1586 [employer].  Care instructions adapted under license by [employer] (which disclaims liability or warranty for this information). If you have questions about a medical condition or this instruction, always ask your healthcare professional. [address] any warranty or liability for your use of this information. Abdominal Pain: Care Instructions  Your Care Instructions    Abdominal pain has many possible causes. Some aren't serious and get better on their own in a few days. Others need more testing and treatment. If your pain continues or gets worse, you need to be rechecked and may need more tests to find out what is wrong. You may need surgery to correct the problem. Don't ignore new symptoms, such as fever, nausea and vomiting, urination problems, pain that gets worse, and dizziness. These may be signs of a more serious problem. Your doctor may have recommended a follow-up visit in the next 8 to 12 hours. If you are not getting better, you may need more tests or treatment. The doctor has checked you carefully, but problems can develop later. If you notice any problems or new symptoms, get medical treatment right away. Follow-up care is a key part of your treatment and safety. Be sure to make and go to all appointments, and call your doctor if you are having problems. It's also a good idea to know your test results and keep a list of the medicines you take. How can you care for yourself at home? · Rest until you feel better. · To prevent dehydration, drink plenty of fluids, enough so that your urine is light yellow or clear like water. Choose water and other caffeine-free clear liquids until you feel better. If you have kidney, heart, or liver disease and have to limit fluids, talk with your doctor before you increase the amount of fluids you drink. · If your stomach is upset, eat mild foods, such as rice, dry toast or crackers, bananas, and applesauce. Try eating several small meals instead of two or three large ones.   · Wait until 48 hours after all symptoms have gone away before you have spicy foods, alcohol, and drinks that contain caffeine. · Do not eat foods that are high in fat. · Avoid anti-inflammatory medicines such as aspirin, ibuprofen (Advil, Motrin), and naproxen (Aleve). These can cause stomach upset. Talk to your doctor if you take daily aspirin for another health problem. When should you call for help? Call 911 anytime you think you may need emergency care. For example, call if:  ? · You passed out (lost consciousness). ? · You pass maroon or very bloody stools. ? · You vomit blood or what looks like coffee grounds. ? · You have new, severe belly pain. ?Call your doctor now or seek immediate medical care if:  ? · Your pain gets worse, especially if it becomes focused in one area of your belly. ? · You have a new or higher fever. ? · Your stools are black and look like tar, or they have streaks of blood. ? · You have unexpected vaginal bleeding. ? · You have symptoms of a urinary tract infection. These may include:  ¨ Pain when you urinate. ¨ Urinating more often than usual.  ¨ Blood in your urine. ? · You are dizzy or lightheaded, or you feel like you may faint. ? Watch closely for changes in your health, and be sure to contact your doctor if:  ? · You are not getting better after 1 day (24 hours). Where can you learn more? Go to http://bird-radha.info/. Enter I194 in the search box to learn more about \"Abdominal Pain: Care Instructions. \"  Current as of: March 20, 2017  Content Version: 11.4  © 1064-8595 [employer]. Care instructions adapted under license by [employer] (which disclaims liability or warranty for this information). If you have questions about a medical condition or this instruction, always ask your healthcare professional. [address] any warranty or liability for your use of this information.

## 2018-02-13 NOTE — ED NOTES
Dr. Donella Goodpasture reviewed discharge instructions with the patient and spouse. The patient and spouse verbalized understanding. Patient to be transported home by wife.

## 2018-02-13 NOTE — ED NOTES
Assumed care of patient via triage. Reports left side flank pain since 2am yesterday morning. Denies any n/v/d or recent sickness. Placed on monitor x2. Wife at bedside.

## 2018-02-13 NOTE — PROGRESS NOTES
Chief Complaint   Patient presents with    Flank Pain     follow up ER      Tender thru abdomin, was urinating every 5 min just a few drops, now going every 1-2 hours, patient does not feel this is a normal flow. Also had diarrhea with urinating.

## 2018-02-13 NOTE — MR AVS SNAPSHOT
[facility] 
 
 
 [address] [facility] [id number] [phone] Patient: Myranda Laws MRN: [id number] Visit Information Date & Time Provider Department Dept. Phone Encounter #  
 2/13/2018 12:30 PM Sandra Madrid MD [facility] [phone] [id number] Your Appointments 4/18/2018  9:00 AM  
ROUTINE CARE with Sandra Madrid MD  
[facility]   
 [address] [facility] [phone] [address] [facility] [address] Upcoming Health Maintenance Date Due  
 EYE EXAM RETINAL OR DILATED Q1 6/8/2017 HEMOGLOBIN A1C Q6M 4/18/2018 GLAUCOMA SCREENING Q2Y 6/8/2018 FOOT EXAM Q1 10/18/2018 MICROALBUMIN Q1 10/18/2018 LIPID PANEL Q1 10/18/2018 MEDICARE YEARLY EXAM 10/19/2018 DTaP/Tdap/Td series (2 - Td) 5/16/2026 Allergies as of 2/13/2018  Review Complete On: 2/13/2018 By: Sandra Madrid MD  
  
 Severity Noted Reaction Type Reactions Ciprofloxacin  04/18/2014   Side Effect Other (comments) Nauseated, felt fuzzy Erythromycin  04/30/2010    Unknown (comments) Pcn [Penicillins]  04/30/2010    Swelling Current Immunizations  Reviewed on 8/2/2016 Name Date Influenza High Dose Vaccine PF 10/18/2017, 9/12/2016, 11/30/2015, 10/13/2014 Influenza Vaccine 11/18/2013 Influenza Vaccine Split 10/31/2012, 10/17/2011, 10/29/2010 TD Vaccine 8/28/2012 ZZZ-RETIRED (DO NOT USE) Pneumococcal Vaccine (Unspecified Type) 8/28/2012 Not reviewed this visit You Were Diagnosed With   
  
 Codes Comments Benign prostatic hyperplasia with urinary frequency    -  Primary ICD-10-CM: N40.1, R35.0 ICD-9-CM: 600.01, 788.41 Hydronephrosis, right     ICD-10-CM: N13.30 ICD-9-CM: 905 Elevated PSA     ICD-10-CM: R97.20 ICD-9-CM: 790.93 Vitals BP Pulse Temp Resp Height(growth percentile) Weight(growth percentile) 149/58 (BP 1 Location: Right arm, BP Patient Position: Sitting) (!) 56 97.4 °F (36.3 °C) (Oral) 16 5' 7\" (1.702 m) 157 lb (71.2 kg) SpO2 BMI Smoking Status 99% 24.59 kg/m2 Former Smoker Vitals History BMI and BSA Data Body Mass Index Body Surface Area 24.59 kg/m 2 1.83 m 2 Preferred Pharmacy Pharmacy Name Phone [facility] [address], [address] [place] [address] Avenue [phone] Your Updated Medication List  
  
   
This list is accurate as of: 2/13/18  2:01 PM.  Always use your most recent med list.  
  
  
  
  
 allopurinol 300 mg tablet Commonly known as:  ZYLOPRIM  
TAKE ONE TABLET BY MOUTH ONCE DAILY TO PREVENT GOUT  
  
 ALPRAZolam 0.5 mg tablet Commonly known as:  XANAX  
TAKE ONE TABLET BY MOUTH TWICE DAILY AS NEEDED FOR ANXIETY  
  
 aspirin 325 mg tablet Commonly known as:  ASPIRIN Take 325 mg by mouth daily. atenolol 50 mg tablet Commonly known as:  TENORMIN  
TAKE ONE TABLET BY MOUTH TWICE DAILY FOR BLOOD PRESSURE  
  
 diphenoxylate-atropine 2.5-0.025 mg per tablet Commonly known as:  LOMOTIL Take 1 Tab by mouth four (4) times daily as needed for Diarrhea. Max Daily Amount: 4 Tabs. finasteride 5 mg tablet Commonly known as:  PROSCAR Take 1 Tab by mouth daily. For prostate  
  
 furosemide 40 mg tablet Commonly known as:  LASIX One tab po daily as needed for fluid  
  
 hydroCHLOROthiazide 25 mg tablet Commonly known as:  HYDRODIURIL  
TAKE ONE TABLET BY MOUTH ONCE DAILY  
  
 lisinopril 5 mg tablet Commonly known as:  [employer] Take 1 Tab by mouth daily. For kidneys MULTIPLE VITAMINS PO Take 1 Tab by mouth daily. pravastatin 20 mg tablet Commonly known as:  PRAVACHOL  
TAKE ONE TABLET BY MOUTH ONCE DAILY AT  NIGHT  FOR  CHOLESTEROL  
  
 PRESERVISION AREDS 2 PO Take  by mouth daily. promethazine-codeine 6.25-10 mg/5 mL syrup Commonly known as:  PHENERGAN with CODEINE Take 5 mL by mouth four (4) times daily as needed for Cough. Max Daily Amount: 20 mL. terazosin 10 mg capsule Commonly known as:  HYTRIN  
TAKE ONE CAPSULE BY MOUTH ONCE DAILY FOR BLOOD PRESSURE AND PROSTATE  
  
 triazolam 0.25 mg tablet Commonly known as:  [employer] Take 1 Tab by mouth nightly as needed. Max Daily Amount: 0.25 mg.  
  
 trimethoprim-sulfamethoxazole 160-800 mg per tablet Commonly known as:  BACTRIM DS, SEPTRA DS Take 1 Tab by mouth two (2) times a day for 10 days. VITAMIN D3 1,000 unit Cap Generic drug:  cholecalciferol Take 1 Tab by mouth daily. Prescriptions Sent to Pharmacy Refills  
 finasteride (PROSCAR) 5 mg tablet 12 Sig: Take 1 Tab by mouth daily. For prostate Class: Normal  
 Pharmacy: [facility] [address] ZACK SPENCER [address] [place] [address] Ph #: [phone] Route: Oral  
 trimethoprim-sulfamethoxazole (BACTRIM DS, SEPTRA DS) 160-800 mg per tablet 0 Sig: Take 1 Tab by mouth two (2) times a day for 10 days. Class: Normal  
 Pharmacy: [facility] DR ZACK SPENCER [address] [place] [address] Ph #: [phone] Route: Oral  
  
We Performed the Following CULTURE, URINE [id number] CPT(R)] REFERRAL TO UROLOGY [IUL313 Custom] Referral Information Referral ID Referred By Referred To  
  
 [id number] Nils Max Not Available Visits Status Start Date End Date 1 New Request 2/13/18 2/13/19 If your referral has a status of pending review or denied, additional information will be sent to support the outcome of this decision. Introducing \[facility] [place] [facility]\"" & HEALTH SERVICES! Dear Dang Donohue: 
Thank you for requesting a [employer] account. Our records indicate that you have previously registered for a [employer] account but its currently inactive. Please call our [employer] support line at [phone]. Additional Information If you have questions, please visit the Frequently Asked Questions section of the [employer] website at [email or web address] Remember, [employer] is NOT to be used for urgent needs. For medical emergencies, dial 911. Now available from your iPhone and Android! Please provide this summary of care documentation to your next provider. Your primary care clinician is listed as Sayra Santo. If you have any questions after today's visit, please call [phone].

## 2018-02-13 NOTE — ED PROVIDER NOTES
EMERGENCY DEPARTMENT HISTORY AND PHYSICAL EXAM      Date: 2/12/2018  Patient Name: Floyd Becker    History of Presenting Illness     Chief Complaint   Patient presents with    Flank Pain     Pt ambulatory to triage with c/o L sided flank pain since 2 am.  Pt reports urinary frequency and burning. Denies blood in urine.  Urinary Frequency       History Provided By: Patient    HPI: Floyd Becker is a 80 y.o. male, pmhx gout, HTN, DM, who presents ambulatory to the ED c/o persistent increased urinary frequency and urgency with associated burning lower abdominal/flank pain x yesterday morning. Pt reports associated nausea and diarrhea. He notes having bowel movements w/ urinating. He denies taking any medications to modify his sxs. Of note, the pt reports just finished doxycycline for diagnosed Bronchitis. Pt specifically denies any fever, congestion, cough, shortness of breath, chest pain, vomiting, hematuria, blood in stool, or dysuria. PCP: Veronica Calzada MD    PMHx: Significant for gout, HTN, DM  PSHx: Significant for hernia repair, colonoscopy  Social Hx: -tobacco (former), -EtOH, -Illicit Drugs    There are no other complaints, changes, or physical findings at this time. Current Outpatient Prescriptions   Medication Sig Dispense Refill    promethazine-codeine (PHENERGAN WITH CODEINE) 6.25-10 mg/5 mL syrup Take 5 mL by mouth four (4) times daily as needed for Cough. Max Daily Amount: 20 mL. 180 mL 1    predniSONE (DELTASONE) 10 mg tablet Take 1 Tab by mouth two (2) times a day. 10 Tab 0    triazolam (HALCION) 0.25 mg tablet Take 1 Tab by mouth nightly as needed.  Max Daily Amount: 0.25 mg. 90 Tab 1    ALPRAZolam (XANAX) 0.5 mg tablet TAKE ONE TABLET BY MOUTH TWICE DAILY AS NEEDED FOR ANXIETY 60 Tab 3    hydroCHLOROthiazide (HYDRODIURIL) 25 mg tablet TAKE ONE TABLET BY MOUTH ONCE DAILY 90 Tab 3    pravastatin (PRAVACHOL) 20 mg tablet TAKE ONE TABLET BY MOUTH ONCE DAILY AT NIGHT  FOR  CHOLESTEROL 90 Tab 3    atenolol (TENORMIN) 50 mg tablet TAKE ONE TABLET BY MOUTH TWICE DAILY FOR BLOOD PRESSURE 180 Tab 3    terazosin (HYTRIN) 10 mg capsule TAKE ONE CAPSULE BY MOUTH ONCE DAILY FOR BLOOD PRESSURE AND PROSTATE 90 Cap 12    allopurinol (ZYLOPRIM) 300 mg tablet TAKE ONE TABLET BY MOUTH ONCE DAILY TO PREVENT GOUT 30 Tab 0    lisinopril (PRINIVIL, ZESTRIL) 5 mg tablet Take 1 Tab by mouth daily. For kidneys 90 Tab 3    VIT C/E/ZN/COPPR/LUTEIN/ZEAXAN (PRESERVISION AREDS 2 PO) Take  by mouth daily.  diphenoxylate-atropine (LOMOTIL) 2.5-0.025 mg per tablet Take 1 Tab by mouth four (4) times daily as needed for Diarrhea. Max Daily Amount: 4 Tabs. 30 Tab 0    furosemide (LASIX) 40 mg tablet One tab po daily as needed for fluid 30 Tab 5    Cholecalciferol, Vitamin D3, (VITAMIN D3) 1,000 unit Cap Take 1 Tab by mouth daily.  aspirin (ASPIRIN) 325 mg tablet Take 325 mg by mouth daily.  MULTIVITAMINS (MULTIPLE VITAMINS PO) Take 1 Tab by mouth daily. Past History     Past Medical History:  Past Medical History:   Diagnosis Date    BP (high blood pressure) 4/30/2010    Diabetes mellitus, type 2 ([address]     Elevated PSA 4/30/2010    Gout 4/30/2010       Past Surgical History:  Past Surgical History:   Procedure Laterality Date    HX COLONOSCOPY      HX HERNIA REPAIR         Family History:  Family History   Problem Relation Age of Onset    Diabetes Brother        Social History:  Social History   Substance Use Topics    Smoking status: Former Smoker     Quit date: 1/1/1965    Smokeless tobacco: Never Used      Comment: 1965    Alcohol use No       Allergies: Allergies   Allergen Reactions    Bactrim [Sulfamethoprim Ds] Nausea Only    Ciprofloxacin Other (comments)     Nauseated, felt fuzzy    Erythromycin Unknown (comments)    Pcn [Penicillins] Swelling         Review of Systems   Review of Systems   Constitutional: Negative for chills and fever.    HENT: Negative. Eyes: Negative. Respiratory: Negative for cough, chest tightness and shortness of breath. Cardiovascular: Negative for chest pain and leg swelling. Gastrointestinal: Positive for abdominal pain, diarrhea and nausea. Negative for blood in stool and vomiting. Endocrine: Negative. Genitourinary: Positive for decreased urine volume, flank pain, frequency and urgency. Negative for difficulty urinating, dysuria and hematuria. Musculoskeletal: Negative for myalgias. Skin: Negative. Neurological: Negative. Psychiatric/Behavioral: Negative. All other systems reviewed and are negative. Physical Exam   Physical Exam   Constitutional: He is oriented to person, place, and time. He appears well-developed and well-nourished. No distress. HENT:   Head: Normocephalic and atraumatic. Nose: Nose normal.   Mouth/Throat: No oropharyngeal exudate. Eyes: Conjunctivae and EOM are normal. Pupils are equal, round, and reactive to light. Neck: Normal range of motion. Neck supple. No JVD present. Cardiovascular: Normal rate, regular rhythm, normal heart sounds and intact distal pulses. Exam reveals no friction rub. No murmur heard. Pulmonary/Chest: Effort normal and breath sounds normal. No stridor. No respiratory distress. He has no wheezes. He has no rales. Abdominal: Soft. Bowel sounds are normal. He exhibits no distension. There is no tenderness. There is no rebound. Musculoskeletal: Normal range of motion. He exhibits no tenderness. Neurological: He is alert and oriented to person, place, and time. No cranial nerve deficit. Skin: Skin is warm and dry. No rash noted. He is not diaphoretic. Psychiatric: He has a normal mood and affect. His speech is normal and behavior is normal. Judgment and thought content normal. Cognition and memory are normal.   Nursing note and vitals reviewed.         Diagnostic Study Results     Labs -     Recent Results (from the past 12 hour(s)) URINALYSIS W/ REFLEX CULTURE    Collection Time: 02/12/18 11:56 PM   Result Value Ref Range    Color YELLOW/STRAW      Appearance CLEAR CLEAR      Specific gravity 1.012 1.003 - 1.030      pH (UA) 6.5 5.0 - 8.0      Protein 30 (A) NEG mg/dL    Glucose NEGATIVE  NEG mg/dL    Ketone NEGATIVE  NEG mg/dL    Bilirubin NEGATIVE  NEG      Blood MODERATE (A) NEG      Urobilinogen 0.2 0.2 - 1.0 EU/dL    Nitrites NEGATIVE  NEG      Leukocyte Esterase NEGATIVE  NEG      WBC 0-4 0 - 4 /hpf    RBC 20-50 0 - 5 /hpf    Epithelial cells FEW FEW /lpf    Bacteria NEGATIVE  NEG /hpf    UA:UC IF INDICATED CULTURE NOT INDICATED BY UA RESULT CNI      Hyaline cast 0-2 0 - 5 /lpf   METABOLIC PANEL, COMPREHENSIVE    Collection Time: 02/12/18 11:56 PM   Result Value Ref Range    Sodium 137 136 - 145 mmol/L    Potassium 4.1 3.5 - 5.1 mmol/L    Chloride 106 97 - 108 mmol/L    CO2 24 21 - 32 mmol/L    Anion gap 7 5 - 15 mmol/L    Glucose 183 (H) 65 - 100 mg/dL    BUN 26 (H) 6 - 20 MG/DL    Creatinine 1.14 0.70 - 1.30 MG/DL    BUN/Creatinine ratio 23 (H) 12 - 20      GFR est AA >60 >60 ml/min/1.73m2    GFR est non-AA >60 >60 ml/min/1.73m2    Calcium 9.0 8.5 - 10.1 MG/DL    Bilirubin, total 1.0 0.2 - 1.0 MG/DL    ALT (SGPT) 18 12 - 78 U/L    AST (SGOT) 13 (L) 15 - 37 U/L    Alk.  phosphatase 140 (H) 45 - 117 U/L    Protein, total 6.9 6.4 - 8.2 g/dL    Albumin 3.9 3.5 - 5.0 g/dL    Globulin 3.0 2.0 - 4.0 g/dL    A-G Ratio 1.3 1.1 - 2.2     CBC WITH AUTOMATED DIFF    Collection Time: 02/12/18 11:56 PM   Result Value Ref Range    WBC 9.4 4.1 - 11.1 K/uL    RBC 4.21 4.10 - 5.70 M/uL    HGB 12.2 12.1 - 17.0 g/dL    HCT 34.9 (L) 36.6 - 50.3 %    MCV 82.9 80.0 - 99.0 FL    MCH 29.0 26.0 - 34.0 PG    MCHC 35.0 30.0 - 36.5 g/dL    RDW 14.4 11.5 - 14.5 %    PLATELET 353 (L) 022 - 400 K/uL    MPV 11.5 8.9 - 12.9 FL    NRBC 0.0 0  WBC    ABSOLUTE NRBC 0.00 0.00 - 0.01 K/uL    NEUTROPHILS 89 (H) 32 - 75 %    LYMPHOCYTES 6 (L) 12 - 49 %    MONOCYTES 4 (L) 5 - 13 %    EOSINOPHILS 0 0 - 7 %    BASOPHILS 0 0 - 1 %    IMMATURE GRANULOCYTES 1 (H) 0.0 - 0.5 %    ABS. NEUTROPHILS 8.3 (H) 1.8 - 8.0 K/UL    ABS. LYMPHOCYTES 0.6 (L) 0.8 - 3.5 K/UL    ABS. MONOCYTES 0.4 0.0 - 1.0 K/UL    ABS. EOSINOPHILS 0.0 0.0 - 0.4 K/UL    ABS. BASOPHILS 0.0 0.0 - 0.1 K/UL    ABS. IMM. GRANS. 0.1 (H) 0.00 - 0.04 K/UL    DF SMEAR SCANNED      RBC COMMENTS NORMOCYTIC, NORMOCHROMIC         Radiologic Studies -   CT ABD PELV WO CONT   Final Result        CT Results  (Last 48 hours)               02/13/18 0025  CT ABD PELV WO CONT Final result    Impression:  IMPRESSION: Right-sided hydronephrosis and hydroureter with markedly enlarged   prostate.: Diverticulosis of. Large left inguinal hernia. CONTRAST:  None. TECHNIQUE:    Thin axial images were obtained through the abdomen and pelvis. Coronal and   sagittal reconstructions were generated. Oral contrast was not administered. CT   dose reduction was achieved through use of a standardized protocol tailored for   this examination and automatic exposure control for dose modulation. The absence of intravenous contrast material reduces the sensitivity for   evaluation of the solid parenchymal organs of the abdomen. Narrative:  EXAM:  CT ABD PELV WO CONT       INDICATION: Pain, flank colic/kidney; L flank pain       COMPARISON: None        incidental 5 mm or nodule pleural-based at the left lung base. Liver and spleen   are normal in size, the gallbladder is not distended. Pancreas appears   unremarkable. The prostate is markedly enlarged there is a right-sided   hydronephrosis and hydroureter without definite evidence for a calculus. This   could be related to a mass or D enlarged prostate. The bladder is midline. There   is no obstruction on the left. No intrarenal renal calcification. Diffuse colon   diverticulosis.  There is no bowel wall thickening or obstruction no free air or   free fluid no definite adenopathy, calcification of the abdominal aorta without   focal aneurysm. Large left inguinal hernia containing nondistended bowel. CXR Results  (Last 48 hours)    None            Medical Decision Making   I am the first provider for this patient. I reviewed the vital signs, available nursing notes, past medical history, past surgical history, family history and social history. Vital Signs-Reviewed the patient's vital signs. Patient Vitals for the past 12 hrs:   Temp Pulse Resp BP SpO2   02/13/18 0300 - - - 137/74 -   02/13/18 0245 - - - 156/69 -   02/13/18 0230 - - - 158/69 -   02/13/18 0215 - - - 177/67 -   02/13/18 0200 - - - 150/74 -   02/13/18 0145 - - - 158/74 -   02/13/18 0130 - - - 186/75 -   02/13/18 0109 - - - 166/75 92 %   02/12/18 2318 98 °F (36.7 °C) 72 16 177/81 100 %       Pulse Oximetry Analysis - 100% on RA    Cardiac Monitor:   Rate: 72 bpm  Rhythm: Normal Sinus Rhythm      Records Reviewed: Nursing Notes, Old Medical Records, Previous Radiology Studies and Previous Laboratory Studies    Provider Notes (Medical Decision Making):     DDX:  Uti, pyelo, kidney stone, hernia, colitis    Plan:  Labs, ct, ua    Impression:  Flank pain, hematuria, inguinal hernia, diverticulosis    ED Course:   Initial assessment performed. The patients presenting problems have been discussed, and they are in agreement with the care plan formulated and outlined with them. I have encouraged them to ask questions as they arise throughout their visit. I reviewed our electronic medical record system for any past medical records that were available that may contribute to the patients current condition, the nursing notes and and vital signs from today's visit    Nursing notes will be reviewed as they become available in realtime while the pt has been in the ED. Ethan Vo MD    I personally reviewed pt's imaging. Official read by radiology listed below.   Ethan Vo MD    PROGRESS NOTE:  1:20 AM  Pt reevaluated. Reviewed abd CT and lab results. Will provide Fentanyl and continue to monitor. Written by JESSICA Ovalles, as dictated by Ally Roque MD      3:31 AM  Progress note:  Pt noted to be feeling better, ready for discharge. Discussed lab and imaging findings with pt and/or family, specifically noting positive hematuria, and CT results (hernia/diverticulosis). Pt will follow up with urology as instructed. All questions have been answered, pt voiced understanding and agreement with plan. If narcotics were prescribed, pt was advised not to drive or operate heavy machinery. If abx were prescribed, pt advised that diarrhea and rash are possible side effects of the medications. Specific return precautions provided in addition to instructions for pt to return to the ED immediately should sx worsen at any time. Ally Roque MD      PLAN:  1. Current Discharge Medication List        2. Follow-up Information     Follow up With Details Comments Contact Info    Mart Martin MD Schedule an appointment as soon as possible for a visit  [address] [facility]  [address] [facility]      Reymundo Caldera MD Schedule an appointment as soon as possible for a visit As needed [address] [facility] [address]  [phone]      [facility] Schedule an appointment as soon as possible for a visit  [address] [facility] [address]          Return to ED if worse     Disposition:    3:31 AM   The patient's results have been reviewed with family and/or caregiver. They verbally convey their understanding and agreement of the patient's signs, symptoms, diagnosis, treatment and prognosis and additionally agree to follow up as recommended in the discharge instructions or to return to the Emergency Room should the patient's condition change prior to their follow-up appointment.  The family and/or caregiver verbally agrees with the care-plan and all of their questions have been answered. The discharge instructions have also been provided to the them with educational information regarding the patient's diagnosis as well a list of reasons why the patient would want to return to the ER prior to their follow-up appointment should their condition change. Ayde Cantu MD      Diagnosis     Clinical Impression:   1. Acute left flank pain    2. Diverticulosis of large intestine without hemorrhage    3. Unilateral inguinal hernia without obstruction or gangrene, recurrence not specified    4. Urinary frequency    5. Hematuria, unspecified type        Attestations: This note is prepared by Grant Peters, acting as Scribe for MD Ayde Bedolla MD : The scribe's documentation has been prepared under my direction and personally reviewed by me in its entirety. I confirm that the note above accurately reflects all work, treatment, procedures, and medical decision making performed by me. This note will not be viewable in [address]

## 2018-03-27 ENCOUNTER — OFFICE VISIT (OUTPATIENT)
Dept: FAMILY MEDICINE CLINIC | Age: 83
End: 2018-03-27

## 2018-03-27 ENCOUNTER — HOSPITAL ENCOUNTER (OUTPATIENT)
Dept: LAB | Age: 83
Discharge: HOME | End: 2018-03-27
Payer: MEDICARE

## 2018-03-27 VITALS
WEIGHT: 161.6 LBS | HEIGHT: 67 IN | OXYGEN SATURATION: 98 % | SYSTOLIC BLOOD PRESSURE: 146 MMHG | HEART RATE: 56 BPM | RESPIRATION RATE: 14 BRPM | BODY MASS INDEX: 25.36 KG/M2 | TEMPERATURE: 96.6 F | DIASTOLIC BLOOD PRESSURE: 76 MMHG

## 2018-03-27 DIAGNOSIS — R97.20 ELEVATED PSA: ICD-10-CM

## 2018-03-27 DIAGNOSIS — R39.12 WEAK URINARY STREAM: ICD-10-CM

## 2018-03-27 DIAGNOSIS — I25.111 CORONARY ARTERY DISEASE INVOLVING NATIVE CORONARY ARTERY OF NATIVE HEART WITH ANGINA PECTORIS WITH DOCUMENTED SPASM (HCC): ICD-10-CM

## 2018-03-27 DIAGNOSIS — R07.89 CHEST TIGHTNESS: Primary | ICD-10-CM

## 2018-03-27 PROCEDURE — 84153 ASSAY OF PSA TOTAL: CPT

## 2018-03-27 PROCEDURE — 80048 BASIC METABOLIC PNL TOTAL CA: CPT

## 2018-03-27 PROCEDURE — 36415 COLL VENOUS BLD VENIPUNCTURE: CPT

## 2018-03-27 NOTE — MR AVS SNAPSHOT
[address] [facility] 
 
 
 [address] [facility] [address] [address] 
[phone] Patient: Blanca Vo MRN: [id number] NCE:0/97/3514 Visit Information Date & Time Provider Department Dept. Phone Encounter #  
 3/27/2018  9:00 AM Jessica Gandhi MD [facility] [phone] [id number] Follow-up Instructions Return in about 3 months (around 6/27/2018). Follow-up and Disposition History Your Appointments 4/18/2018  9:00 AM  
ROUTINE CARE with Jessica Gandhi MD  
[facility] Appt Note: 6 mnth   
 [address] [facility] [address] [address]  
[phone] [address] [facility] [address] Upcoming Health Maintenance Date Due  
 EYE EXAM RETINAL OR DILATED Q1 6/8/2017 HEMOGLOBIN A1C Q6M 4/18/2018 GLAUCOMA SCREENING Q2Y 6/8/2018 FOOT EXAM Q1 10/18/2018 MICROALBUMIN Q1 10/18/2018 LIPID PANEL Q1 10/18/2018 MEDICARE YEARLY EXAM 10/19/2018 DTaP/Tdap/Td series (2 - Td) 5/16/2026 Allergies as of 3/27/2018  Review Complete On: 3/27/2018 By: Jessica Gandhi MD  
  
 Severity Noted Reaction Type Reactions Ciprofloxacin  04/18/2014   Side Effect Other (comments) Nauseated, felt fuzzy Erythromycin  04/30/2010    Unknown (comments) Pcn [Penicillins]  04/30/2010    Swelling Current Immunizations  Reviewed on 8/2/2016 Name Date Influenza High Dose Vaccine PF 10/18/2017, 9/12/2016, 11/30/2015, 10/13/2014 Influenza Vaccine 11/18/2013 Influenza Vaccine Split 10/31/2012, 10/17/2011, 10/29/2010 TD Vaccine 8/28/2012 ZZZ-RETIRED (DO NOT USE) Pneumococcal Vaccine (Unspecified Type) 8/28/2012 Not reviewed this visit You Were Diagnosed With   
  
 Codes Comments Chest tightness    -  Primary ICD-10-CM: R07.89 ICD-9-CM: 786.59 Elevated PSA     ICD-10-CM: R97.20 ICD-9-CM: 790.93   
 Weak urinary stream     ICD-10-CM: R39.12 
ICD-9-CM: 788.62 Coronary artery disease involving native coronary artery of native heart with angina pectoris with documented spasm ([facility]     ICD-10-CM: I25.111 ICD-9-CM: 414.01, 413.9 Vitals BP Pulse Temp Resp Height(growth percentile) Weight(growth percentile) 146/76 (!) 56 96.6 °F (35.9 °C) (Oral) 14 5' 7\" (1.702 m) 161 lb 9.6 oz (73.3 kg) SpO2 BMI Smoking Status 98% 25.31 kg/m2 Former Smoker Vitals History BMI and BSA Data Body Mass Index Body Surface Area  
 25.31 kg/m 2 1.86 m 2 Preferred Pharmacy Pharmacy Name Phone [facility] [address], [address] [place] [address] Avenue [phone] Your Updated Medication List  
  
   
This list is accurate as of 3/27/18 10:24 AM.  Always use your most recent med list.  
  
  
  
  
 allopurinol 300 mg tablet Commonly known as:  ZYLOPRIM  
TAKE ONE TABLET BY MOUTH ONCE DAILY TO PREVENT GOUT  
  
 ALPRAZolam 0.5 mg tablet Commonly known as:  XANAX  
TAKE ONE TABLET BY MOUTH TWICE DAILY AS NEEDED FOR ANXIETY  
  
 aspirin 325 mg tablet Commonly known as:  ASPIRIN Take 325 mg by mouth daily. atenolol 50 mg tablet Commonly known as:  TENORMIN  
TAKE ONE TABLET BY MOUTH TWICE DAILY FOR BLOOD PRESSURE  
  
 diphenoxylate-atropine 2.5-0.025 mg per tablet Commonly known as:  LOMOTIL Take 1 Tab by mouth four (4) times daily as needed for Diarrhea. Max Daily Amount: 4 Tabs. finasteride 5 mg tablet Commonly known as:  PROSCAR Take 1 Tab by mouth daily. For prostate  
  
 furosemide 40 mg tablet Commonly known as:  LASIX One tab po daily as needed for fluid  
  
 hydroCHLOROthiazide 25 mg tablet Commonly known as:  HYDRODIURIL  
TAKE ONE TABLET BY MOUTH ONCE DAILY  
  
 lisinopril 5 mg tablet Commonly known as:  Tyrone Pettibone Take 1 Tab by mouth daily. For kidneys  MULTIPLE VITAMINS PO  
 Take 1 Tab by mouth daily. pravastatin 20 mg tablet Commonly known as:  PRAVACHOL  
TAKE ONE TABLET BY MOUTH ONCE DAILY AT  NIGHT  FOR  CHOLESTEROL  
  
 PRESERVISION AREDS 2 PO Take  by mouth daily. promethazine-codeine 6.25-10 mg/5 mL syrup Commonly known as:  PHENERGAN with CODEINE Take 5 mL by mouth four (4) times daily as needed for Cough. Max Daily Amount: 20 mL. terazosin 10 mg capsule Commonly known as:  HYTRIN  
TAKE ONE CAPSULE BY MOUTH ONCE DAILY FOR BLOOD PRESSURE AND PROSTATE  
  
 triazolam 0.25 mg tablet Commonly known as:  Naeem Caras Take 1 Tab by mouth nightly as needed. Max Daily Amount: 0.25 mg. VITAMIN D3 1,000 unit Cap Generic drug:  cholecalciferol Take 1 Tab by mouth daily. We Performed the Following AMB POC EKG ROUTINE W/ 12 LEADS, INTER & REP [57891 CPT(R)] METABOLIC PANEL, BASIC [13377 CPT(R)] PSA, DIAGNOSTIC (PROSTATE SPECIFIC AG) [id number] CPT(R)] Follow-up Instructions Return in about 3 months (around 6/27/2018). Introducing [facility]! Dear Paige: 
Thank you for requesting a [employer] account. Our records indicate that you have previously registered for a [employer] account but its currently inactive. Please call our [employer] support line at [phone]. Additional Information If you have questions, please visit the Frequently Asked Questions section of the [employer] website at [email or web address] Remember, [employer] is NOT to be used for urgent needs. For medical emergencies, dial 911. Now available from your iPhone and Android! Please provide this summary of care documentation to your next provider. Your primary care clinician is listed as Jose R Jones. If you have any questions after today's visit, please call [phone].

## 2018-03-27 NOTE — PROGRESS NOTES
HISTORY OF PRESENT ILLNESS  Will Bowden is a 80 y.o. male. HPI In for recheck. Voiding much better since starting finasteride. Is also taking terazosin. Never did see urology due to scheduling problems. Also has some problems when lies down with palpitations. Has also had some dyspnea over the winter. Went out to work in the yard, had some chest pains if is walking fast or raking leaves. NOnsmoker, had a positive stress test 6 years ago. ROS    Physical Exam   Constitutional: He appears well-developed and well-nourished. HENT:   Right Ear: External ear normal.   Left Ear: External ear normal.   Mouth/Throat: Oropharynx is clear and moist.   Neck: No thyromegaly present. Cardiovascular: Normal rate, regular rhythm, normal heart sounds and intact distal pulses. Pulmonary/Chest: Effort normal and breath sounds normal. No respiratory distress. He has no wheezes. Abdominal: Soft. Bowel sounds are normal. He exhibits no distension and no mass. There is no tenderness. There is no guarding. Musculoskeletal: Normal range of motion. He exhibits no edema. Lymphadenopathy:     He has no cervical adenopathy. Nursing note and vitals reviewed. ASSESSMENT and PLAN  Orders Placed This Encounter    PROSTATE SPECIFIC AG    METABOLIC PANEL, BASIC    AMB POC EKG ROUTINE W/ 12 LEADS, INTER & REP     Order Specific Question:   Reason for Exam:     Answer:   exertional chest tightness     Orders Placed This Encounter    PROSTATE SPECIFIC AG    METABOLIC PANEL, BASIC    AMB POC EKG ROUTINE W/ 12 LEADS, INTER & REP     Diagnoses and all orders for this visit:    1. Chest tightness  -     AMB POC EKG ROUTINE W/ 12 LEADS, INTER & REP    2. Elevated PSA  -     PROSTATE SPECIFIC AG    3. Weak urinary stream  -     PROSTATE SPECIFIC AG  -     METABOLIC PANEL, BASIC    4.  Coronary artery disease involving native coronary artery of native heart with angina pectoris with documented spasm [facility])      Follow-up Disposition:  Return in about 3 months (around 6/27/2018). Strongly recommended seeing cardiology for angina like symptoms and past hx of positive stress echo, however, pt declines this at present. Sudden death discussed. He is also reluctant to see urology for elevated PSA.

## 2018-03-27 NOTE — PROGRESS NOTES
Chief Complaint   Patient presents with   Elihue Amos now and then having half a cough\"    Abdominal Pain    Shortness of Breath     1. Have you been to the ER, urgent care clinic since your last visit? Hospitalized since your last visit? No    2. Have you seen or consulted any other health care providers outside of the [address] [place] [address] since your last visit? Include any pap smears or colon screening. No     Health Maintenance Due   Topic Date Due    EYE EXAM RETINAL OR DILATED Q1  06/08/2017    HEMOGLOBIN A1C Q6M  04/18/2018     Pt refused retinal scan.  Pt given release form to take to next eye appt

## 2018-03-28 LAB
BUN SERPL-MCNC: 24 MG/DL (ref 8–27)
BUN/CREAT SERPL: 23 (ref 10–24)
CALCIUM SERPL-MCNC: 9.1 MG/DL (ref 8.6–10.2)
CHLORIDE SERPL-SCNC: 103 MMOL/L (ref 96–106)
CO2 SERPL-SCNC: 24 MMOL/L (ref 18–29)
CREAT SERPL-MCNC: 1.03 MG/DL (ref 0.76–1.27)
GFR SERPLBLD CREATININE-BSD FMLA CKD-EPI: 66 ML/MIN/1.73
GFR SERPLBLD CREATININE-BSD FMLA CKD-EPI: 77 ML/MIN/1.73
GLUCOSE SERPL-MCNC: 169 MG/DL (ref 65–99)
POTASSIUM SERPL-SCNC: 3.9 MMOL/L (ref 3.5–5.2)
PSA SERPL-MCNC: 8.4 NG/ML (ref 0–4)
SODIUM SERPL-SCNC: 143 MMOL/L (ref 134–144)

## 2018-04-12 ENCOUNTER — OFFICE VISIT (OUTPATIENT)
Dept: FAMILY MEDICINE CLINIC | Age: 83
End: 2018-04-12

## 2018-04-12 VITALS
BODY MASS INDEX: 25.11 KG/M2 | SYSTOLIC BLOOD PRESSURE: 131 MMHG | HEIGHT: 67 IN | TEMPERATURE: 97.7 F | RESPIRATION RATE: 14 BRPM | WEIGHT: 160 LBS | DIASTOLIC BLOOD PRESSURE: 69 MMHG | HEART RATE: 63 BPM | OXYGEN SATURATION: 97 %

## 2018-04-12 DIAGNOSIS — R05.9 COUGH: Primary | ICD-10-CM

## 2018-04-12 RX ORDER — LOSARTAN POTASSIUM 25 MG/1
25 TABLET ORAL DAILY
Qty: 30 TAB | Refills: 12 | Status: ON HOLD | OUTPATIENT
Start: 2018-04-12 | End: 2018-08-23 | Stop reason: SDUPTHER

## 2018-04-12 RX ORDER — ALBUTEROL SULFATE 90 UG/1
2 AEROSOL, METERED RESPIRATORY (INHALATION)
Qty: 1 INHALER | Refills: 12 | Status: SHIPPED | OUTPATIENT
Start: 2018-04-12 | End: 2018-07-09 | Stop reason: SDUPTHER

## 2018-04-12 RX ORDER — FLUTICASONE FUROATE AND VILANTEROL 100; 25 UG/1; UG/1
1 POWDER RESPIRATORY (INHALATION) DAILY
Qty: 1 INHALER | Refills: 12 | Status: SHIPPED | OUTPATIENT
Start: 2018-04-12 | End: 2018-12-04 | Stop reason: ALTCHOICE

## 2018-04-12 RX ORDER — CETIRIZINE HCL 10 MG
10 TABLET ORAL DAILY
Qty: 15 TAB | Refills: 5 | Status: SHIPPED | OUTPATIENT
Start: 2018-04-12

## 2018-04-12 NOTE — MR AVS SNAPSHOT
Anise Breeding 
 
 
 [address] [facility] [address] 
[phone] Patient: Kasie Mccracken MRN: [id number] VAC:6/42/9123 Visit Information Date & Time Provider Department Dept. Phone Encounter #  
 4/12/2018  3:00 PM Pillo Ortiz MD [facility] [phone] [id number] Your Appointments 4/12/2018  3:00 PM  
ACUTE CARE with Pillo Ortiz MD  
[facility]) Appt Note: head congestion, cough [address] [facility] [phone] [phone]  
  
    
 6/26/2018  9:00 AM  
ROUTINE CARE with Pillo Ortiz MD  
[facility]) Appt Note: 3m follow up  
 [address] [facility] [address] [id number] [phone] [address] [facility] [address] Upcoming Health Maintenance Date Due  
 EYE EXAM RETINAL OR DILATED Q1 6/8/2017 HEMOGLOBIN A1C Q6M 4/18/2018 GLAUCOMA SCREENING Q2Y 6/8/2018 FOOT EXAM Q1 10/18/2018 MICROALBUMIN Q1 10/18/2018 LIPID PANEL Q1 10/18/2018 MEDICARE YEARLY EXAM 10/19/2018 DTaP/Tdap/Td series (2 - Td) 5/16/2026 Allergies as of 4/12/2018  Review Complete On: 4/12/2018 By: Pillo Ortiz MD  
  
 Severity Noted Reaction Type Reactions Ciprofloxacin  04/18/2014   Side Effect Other (comments) Nauseated, felt fuzzy Erythromycin  04/30/2010    Unknown (comments) Pcn [Penicillins]  04/30/2010    Swelling Current Immunizations  Reviewed on 8/2/2016 Name Date Influenza High Dose Vaccine PF 10/18/2017, 9/12/2016, 11/30/2015, 10/13/2014 Influenza Vaccine 11/18/2013 Influenza Vaccine Split 10/31/2012, 10/17/2011, 10/29/2010 TD Vaccine 8/28/2012 ZZZ-RETIRED (DO NOT USE) Pneumococcal Vaccine (Unspecified Type) 8/28/2012 Not reviewed this visit You Were Diagnosed With   
  
 Codes Comments Cough    -  Primary ICD-10-CM: C92 ICD-9-CM: 375. 2 Vitals BP Pulse Temp Resp Height(growth percentile) Weight(growth percentile) 131/69 63 97.7 °F (36.5 °C) (Oral) 14 5' 7\" (1.702 m) 160 lb (72.6 kg) SpO2 BMI Smoking Status 97% 25.06 kg/m2 Former Smoker Vitals History BMI and BSA Data Body Mass Index Body Surface Area 25.06 kg/m 2 1.85 m 2 Preferred Pharmacy Pharmacy Name Phone [facility] [address] [place] [address] Dr [address], [address] [phone] Your Updated Medication List  
  
   
This list is accurate as of 4/12/18  2:40 PM.  Always use your most recent med list.  
  
  
  
  
 albuterol 90 mcg/actuation inhaler Commonly known as:  PROVENTIL HFA, VENTOLIN HFA, PROAIR HFA Take 2 Puffs by inhalation every four (4) hours as needed for Wheezing. allopurinol 300 mg tablet Commonly known as:  ZYLOPRIM  
TAKE ONE TABLET BY MOUTH ONCE DAILY TO PREVENT GOUT  
  
 ALPRAZolam 0.5 mg tablet Commonly known as:  XANAX  
TAKE ONE TABLET BY MOUTH TWICE DAILY AS NEEDED FOR ANXIETY  
  
 aspirin 325 mg tablet Commonly known as:  ASPIRIN Take 325 mg by mouth daily. atenolol 50 mg tablet Commonly known as:  TENORMIN  
TAKE ONE TABLET BY MOUTH TWICE DAILY FOR BLOOD PRESSURE  
  
 cetirizine 10 mg tablet Commonly known as:  ZYRTEC Take 1 Tab by mouth daily. diphenoxylate-atropine 2.5-0.025 mg per tablet Commonly known as:  LOMOTIL Take 1 Tab by mouth four (4) times daily as needed for Diarrhea. Max Daily Amount: 4 Tabs. finasteride 5 mg tablet Commonly known as:  PROSCAR Take 1 Tab by mouth daily. For prostate  
  
 fluticasone-vilanterol 100-25 mcg/dose inhaler Commonly known as:  BREO ELLIPTA Take 1 Puff by inhalation daily. furosemide 40 mg tablet Commonly known as:  LASIX One tab po daily as needed for fluid hydroCHLOROthiazide 25 mg tablet Commonly known as:  HYDRODIURIL  
TAKE ONE TABLET BY MOUTH ONCE DAILY losartan 25 mg tablet Commonly known as:  COZAAR Take 1 Tab by mouth daily. For blood pressure and kidneys MULTIPLE VITAMINS PO Take 1 Tab by mouth daily. pravastatin 20 mg tablet Commonly known as:  PRAVACHOL  
TAKE ONE TABLET BY MOUTH ONCE DAILY AT  NIGHT  FOR  CHOLESTEROL  
  
 PRESERVISION AREDS 2 PO Take  by mouth daily. promethazine-codeine 6.25-10 mg/5 mL syrup Commonly known as:  PHENERGAN with CODEINE Take 5 mL by mouth four (4) times daily as needed for Cough. Max Daily Amount: 20 mL. terazosin 10 mg capsule Commonly known as:  HYTRIN  
TAKE ONE CAPSULE BY MOUTH ONCE DAILY FOR BLOOD PRESSURE AND PROSTATE  
  
 triazolam 0.25 mg tablet Commonly known as:  Reyna [place] Take 1 Tab by mouth nightly as needed. Max Daily Amount: 0.25 mg. VITAMIN D3 1,000 unit Cap Generic drug:  cholecalciferol Take 1 Tab by mouth daily. Prescriptions Printed Refills  
 cetirizine (ZYRTEC) 10 mg tablet 5 Sig: Take 1 Tab by mouth daily. Class: Print Route: Oral  
  
Prescriptions Sent to Pharmacy Refills  
 losartan (COZAAR) 25 mg tablet 12 Sig: Take 1 Tab by mouth daily. For blood pressure and kidneys Class: Normal  
 Pharmacy: [facility] DR ZACK SPENCER [address] [place] [address] Ph #: [phone] Route: Oral  
 albuterol (PROVENTIL HFA, VENTOLIN HFA, PROAIR HFA) 90 mcg/actuation inhaler 12 Sig: Take 2 Puffs by inhalation every four (4) hours as needed for Wheezing. Class: Normal  
 [facility] [address] Ph #: [phone] Route: Inhalation  
 fluticasone-vilanterol (BREO ELLIPTA) 100-25 mcg/dose inhaler 12 Sig: Take 1 Puff by inhalation daily.   
 Class: Normal  
 [facility] [address]  #: [phone] Route: Inhalation Introducing [facility]! [employer] introduces [employer] patient portal. Now you can access parts of your medical record, email your doctor's office, and request medication refills online. 1. In your internet browser, go to [email or web address] 2. Click on the First Time User? Click Here link in the Sign In box. You will see the New Member Sign Up page. 3. Enter your [employer] Access Code exactly as it appears below. You will not need to use this code after youve completed the sign-up process. If you do not sign up before the expiration date, you must request a new code. · [employer] Access Code: [email or web address] Expires: 7/2/2018  5:31 AM 
 
4. Enter the last four digits of your Social Security Number (xxxx) and Date of Birth (mm/dd/yyyy) as indicated and click Submit. You will be taken to the next sign-up page. 5. Create a [employer] ID. This will be your [employer] login ID and cannot be changed, so think of one that is secure and easy to remember. 6. Create a [employer] password. You can change your password at any time. 7. Enter your Password Reset Question and Answer. This can be used at a later time if you forget your password. 8. Enter your e-mail address. You will receive e-mail notification when new information is available in [address] 9. Click Sign Up. You can now view and download portions of your medical record. 10. Click the Download Summary menu link to download a portable copy of your medical information. If you have questions, please visit the Frequently Asked Questions section of the [employer] website. Remember, [employer] is NOT to be used for urgent needs. For medical emergencies, dial 911. Now available from your iPhone and Android! Please provide this summary of care documentation to your next provider. Your primary care clinician is listed as Rosenda Jernigan.  If you have any questions after today's visit, please call [phone].

## 2018-04-12 NOTE — PROGRESS NOTES
HISTORY OF PRESENT ILLNESS  Mily Deleon is a 80 y.o. male. HPI has had cough and chest congestion for the last 2 weeks. Has been getting this over and over since Xmas, at least 4 times. Has had 2 prostate biopsies in the past, thinks that one was when psa was 12. Had these in 2005. Cough is productive of clear sputum. No fever, chills. Has a constant runny nose. Does get some wheezing at times. Mild dyspnea at times. Not smoking at present. Quit in 1963. ROS    Physical Exam   Constitutional: He appears well-developed and well-nourished. HENT:   Right Ear: External ear normal.   Left Ear: External ear normal.   Mouth/Throat: Oropharynx is clear and moist.   Neck: No thyromegaly present. Cardiovascular: Normal rate, regular rhythm, normal heart sounds and intact distal pulses. Pulmonary/Chest: Effort normal and breath sounds normal. No respiratory distress. He has no wheezes. Some rhonchi and expiratory wheezes   Abdominal: Soft. Bowel sounds are normal. He exhibits no distension and no mass. There is no tenderness. There is no guarding. Musculoskeletal: Normal range of motion. He exhibits no edema. Lymphadenopathy:     He has no cervical adenopathy. Nursing note and vitals reviewed. ASSESSMENT and PLAN  Orders Placed This Encounter    losartan (COZAAR) 25 mg tablet    albuterol (PROVENTIL HFA, VENTOLIN HFA, PROAIR HFA) 90 mcg/actuation inhaler    cetirizine (ZYRTEC) 10 mg tablet    fluticasone-vilanterol (BREO ELLIPTA) 100-25 mcg/dose inhaler     Diagnoses and all orders for this visit:    1. Cough    Other orders  -     losartan (COZAAR) 25 mg tablet; Take 1 Tab by mouth daily. For blood pressure and kidneys  -     albuterol (PROVENTIL HFA, VENTOLIN HFA, PROAIR HFA) 90 mcg/actuation inhaler; Take 2 Puffs by inhalation every four (4) hours as needed for Wheezing.  -     cetirizine (ZYRTEC) 10 mg tablet; Take 1 Tab by mouth daily.   -     fluticasone-vilanterol (BREO ELLIPTA) 100-25 mcg/dose inhaler; Take 1 Puff by inhalation daily. Follow-up Disposition: Not on File  Pt is still reluctant to go back to see urology.

## 2018-04-12 NOTE — PROGRESS NOTES
Chief Complaint   Patient presents with    Nasal Congestion    Sinus Pain    Pressure Behind the Eyes    Headache    Wheezing    Dental Problem     pt \"bleeds \" when \"losing crowns\"      1. Have you been to the ER, urgent care clinic since your last visit? Hospitalized since your last visit? No    2. Have you seen or consulted any other health care providers outside of the [address] [place] [address] since your last visit? Include any pap smears or colon screening.  No     Health Maintenance Due   Topic Date Due    EYE EXAM RETINAL OR DILATED Q1  06/08/2017    HEMOGLOBIN A1C Q6M  04/18/2018    GLAUCOMA SCREENING Q2Y  06/08/2018

## 2018-05-14 ENCOUNTER — OFFICE VISIT (OUTPATIENT)
Dept: FAMILY MEDICINE CLINIC | Age: 83
End: 2018-05-14

## 2018-05-14 VITALS
RESPIRATION RATE: 14 BRPM | DIASTOLIC BLOOD PRESSURE: 50 MMHG | TEMPERATURE: 97.9 F | HEIGHT: 67 IN | OXYGEN SATURATION: 97 % | SYSTOLIC BLOOD PRESSURE: 110 MMHG | BODY MASS INDEX: 24.99 KG/M2 | WEIGHT: 159.2 LBS | HEART RATE: 60 BPM

## 2018-05-14 DIAGNOSIS — E11.9 CONTROLLED TYPE 2 DIABETES MELLITUS WITHOUT COMPLICATION, WITHOUT LONG-TERM CURRENT USE OF INSULIN (HCC): Primary | ICD-10-CM

## 2018-05-14 RX ORDER — ATENOLOL 50 MG/1
TABLET ORAL
Qty: 180 TAB | Refills: 3 | Status: SHIPPED | OUTPATIENT
Start: 2018-05-14 | End: 2019-02-01

## 2018-05-14 NOTE — PROGRESS NOTES
HISTORY OF PRESENT ILLNESS  Deborah Trinh is a 80 y.o. male. HPI still coughing and wheezing a lot. Has been coughing all winter. Doesn't feel like doing anything. Very tired. We stopped lisinopril, but that didn't really help his cough. ROS    Physical Exam   Constitutional: He appears well-developed and well-nourished. HENT:   Right Ear: External ear normal.   Left Ear: External ear normal.   Mouth/Throat: Oropharynx is clear and moist.   Neck: No thyromegaly present. Cardiovascular: Normal rate, regular rhythm, normal heart sounds and intact distal pulses. Pulmonary/Chest: Effort normal and breath sounds normal. No respiratory distress. He has no wheezes. A few rhonchi present bilaterally   Abdominal: Soft. Bowel sounds are normal. He exhibits no distension and no mass. There is no tenderness. There is no guarding. Musculoskeletal: Normal range of motion. He exhibits no edema. Lymphadenopathy:     He has no cervical adenopathy. Nursing note and vitals reviewed. ASSESSMENT and PLAN  Orders Placed This Encounter    METABOLIC PANEL, COMPREHENSIVE    AMB POC HEMOGLOBIN A1C    atenolol (TENORMIN) 50 mg tablet     Sig: TAKE 1/2  TABLET BY MOUTH TWICE DAILY FOR BLOOD PRESSURE     Dispense:  180 Tab     Refill:  3     Orders Placed This Encounter    METABOLIC PANEL, COMPREHENSIVE    AMB POC HEMOGLOBIN A1C    atenolol (TENORMIN) 50 mg tablet     Diagnoses and all orders for this visit:    1. Controlled type 2 diabetes mellitus without complication, without long-term current use of insulin ([facility])  -     AMB POC HEMOGLOBIN M0K  -     METABOLIC PANEL, COMPREHENSIVE    Other orders  -     atenolol (TENORMIN) 50 mg tablet; TAKE 1/2  TABLET BY MOUTH TWICE DAILY FOR BLOOD PRESSURE      Follow-up Disposition:  Return in about 3 weeks (around 6/4/2018). Pt really needs a CXR, but doesn't want to go to hospital for one. Our new machine should be in in 3 weeks, so will xray then.

## 2018-05-14 NOTE — MR AVS SNAPSHOT
Karyle Olp 
 
 
 [address] [facility] [address] 
[phone] Patient: Sergio Portillo MRN: [id number]:[id number] Visit Information Date & Time Provider Department Dept. Phone Encounter #  
 5/14/2018 12:00 PM Lindsey Love MD [facility] [id number] Follow-up Instructions Return in about 3 weeks (around 6/4/2018). Your Appointments 6/26/2018  9:00 AM  
ROUTINE CARE with Lindsey Love MD  
[facility] [address] [facility]) Appt Note: 3m follow up  
 [address] [facility] [id number] [phone] [address] [facility] [address] Upcoming Health Maintenance Date Due  
 EYE EXAM RETINAL OR DILATED Q1 6/8/2017 HEMOGLOBIN A1C Q6M 4/18/2018 GLAUCOMA SCREENING Q2Y 6/8/2018 Influenza Age 5 to Adult 8/1/2018 FOOT EXAM Q1 10/18/2018 MICROALBUMIN Q1 10/18/2018 LIPID PANEL Q1 10/18/2018 MEDICARE YEARLY EXAM 10/19/2018 DTaP/Tdap/Td series (2 - Td) 5/16/2026 Allergies as of 5/14/2018  Review Complete On: 5/14/2018 By: Lindsey Love MD  
  
 Severity Noted Reaction Type Reactions Ciprofloxacin  04/18/2014   Side Effect Other (comments) Nauseated, felt fuzzy Erythromycin  04/30/2010    Unknown (comments) Pcn [Penicillins]  04/30/2010    Swelling Current Immunizations  Reviewed on 8/2/2016 Name Date Influenza High Dose Vaccine PF 10/18/2017, 9/12/2016, 11/30/2015, 10/13/2014 Influenza Vaccine 11/18/2013 Influenza Vaccine Split 10/31/2012, 10/17/2011, 10/29/2010 TD Vaccine 8/28/2012 ZZZ-RETIRED (DO NOT USE) Pneumococcal Vaccine (Unspecified Type) 8/28/2012 Not reviewed this visit You Were Diagnosed With   
  
 Codes Comments Controlled type 2 diabetes mellitus without complication, without long-term current use of insulin ([facility]    -  Primary ICD-10-CM: E11.9 ICD-9-CM: 250.00 Vitals BP Pulse Temp Resp Height(growth percentile) Weight(growth percentile) 110/50 60 97.9 °F (36.6 °C) (Oral) 14 5' 7\" (1.702 m) 159 lb 3.2 oz (72.2 kg) SpO2 BMI Smoking Status 97% 24.93 kg/m2 Former Smoker Vitals History BMI and BSA Data Body Mass Index Body Surface Area 24.93 kg/m 2 1.85 m 2 Preferred Pharmacy Pharmacy Name Phone [facility] [address], [address] [place] [address] Avenue [phone] Your Updated Medication List  
  
   
This list is accurate as of 5/14/18  1:50 PM.  Always use your most recent med list.  
  
  
  
  
 albuterol 90 mcg/actuation inhaler Commonly known as:  PROVENTIL HFA, VENTOLIN HFA, PROAIR HFA Take 2 Puffs by inhalation every four (4) hours as needed for Wheezing. allopurinol 300 mg tablet Commonly known as:  ZYLOPRIM  
TAKE ONE TABLET BY MOUTH ONCE DAILY TO PREVENT GOUT  
  
 ALPRAZolam 0.5 mg tablet Commonly known as:  XANAX  
TAKE ONE TABLET BY MOUTH TWICE DAILY AS NEEDED FOR ANXIETY  
  
 aspirin 325 mg tablet Commonly known as:  ASPIRIN Take 325 mg by mouth daily. atenolol 50 mg tablet Commonly known as:  TENORMIN  
TAKE 1/2  TABLET BY MOUTH TWICE DAILY FOR BLOOD PRESSURE  
  
 cetirizine 10 mg tablet Commonly known as:  ZYRTEC Take 1 Tab by mouth daily. diphenoxylate-atropine 2.5-0.025 mg per tablet Commonly known as:  LOMOTIL Take 1 Tab by mouth four (4) times daily as needed for Diarrhea. Max Daily Amount: 4 Tabs. finasteride 5 mg tablet Commonly known as:  PROSCAR Take 1 Tab by mouth daily. For prostate  
  
 fluticasone-vilanterol 100-25 mcg/dose inhaler Commonly known as:  BREO ELLIPTA Take 1 Puff by inhalation daily. furosemide 40 mg tablet Commonly known as:  LASIX One tab po daily as needed for fluid  
  
 hydroCHLOROthiazide 25 mg tablet Commonly known as:  HYDRODIURIL  
 TAKE ONE TABLET BY MOUTH ONCE DAILY losartan 25 mg tablet Commonly known as:  COZAAR Take 1 Tab by mouth daily. For blood pressure and kidneys MULTIPLE VITAMINS PO Take 1 Tab by mouth daily. pravastatin 20 mg tablet Commonly known as:  PRAVACHOL  
TAKE ONE TABLET BY MOUTH ONCE DAILY AT  NIGHT  FOR  CHOLESTEROL  
  
 PRESERVISION AREDS 2 PO Take  by mouth daily. promethazine-codeine 6.25-10 mg/5 mL syrup Commonly known as:  PHENERGAN with CODEINE Take 5 mL by mouth four (4) times daily as needed for Cough. Max Daily Amount: 20 mL. terazosin 10 mg capsule Commonly known as:  HYTRIN  
TAKE ONE CAPSULE BY MOUTH ONCE DAILY FOR BLOOD PRESSURE AND PROSTATE  
  
 triazolam 0.25 mg tablet Commonly known as:  Wonda Grief Take 1 Tab by mouth nightly as needed. Max Daily Amount: 0.25 mg. VITAMIN D3 1,000 unit Cap Generic drug:  cholecalciferol Take 1 Tab by mouth daily. Prescriptions Sent to Pharmacy Refills  
 atenolol (TENORMIN) 50 mg tablet 3 Sig: TAKE 1/2  TABLET BY MOUTH TWICE DAILY FOR BLOOD PRESSURE Class: Normal  
 Pharmacy: [facility] DR ZACK SPENCER [address] [place] [address] #: [phone] We Performed the Following AMB POC HEMOGLOBIN A1C [95059 CPT(R)] METABOLIC PANEL, COMPREHENSIVE [32516 CPT(R)] Follow-up Instructions Return in about 3 weeks (around 6/4/2018). Introducing [facility] & HEALTH SERVICES! Garrett Aguirre introduces [employer] patient portal. Now you can access parts of your medical record, email your doctor's office, and request medication refills online. 1. In your internet browser, go to [email or web address] 2. Click on the First Time User? Click Here link in the Sign In box. You will see the New Member Sign Up page. 3. Enter your [employer] Access Code exactly as it appears below. You will not need to use this code after youve completed the sign-up process.  If you do not sign up before the expiration date, you must request a new code. · [employer] Access Code: [email or web address] Expires: 7/2/2018  5:31 AM 
 
4. Enter the last four digits of your Social Security Number (xxxx) and Date of Birth (mm/dd/yyyy) as indicated and click Submit. You will be taken to the next sign-up page. 5. Create a [employer] ID. This will be your [employer] login ID and cannot be changed, so think of one that is secure and easy to remember. 6. Create a [employer] password. You can change your password at any time. 7. Enter your Password Reset Question and Answer. This can be used at a later time if you forget your password. 8. Enter your e-mail address. You will receive e-mail notification when new information is available in [address] 9. Click Sign Up. You can now view and download portions of your medical record. 10. Click the Download Summary menu link to download a portable copy of your medical information. If you have questions, please visit the Frequently Asked Questions section of the [employer] website. Remember, [employer] is NOT to be used for urgent needs. For medical emergencies, dial 911. Now available from your iPhone and Android! Please provide this summary of care documentation to your next provider. Your primary care clinician is listed as Erin Pate. If you have any questions after today's visit, please call [phone].

## 2018-05-14 NOTE — PROGRESS NOTES
Chief Complaint   Patient presents with    Breathing Problem    Wheezing    Cough     pt says it \"hurts when I cough\"     Rib Pain     right lower area on the side      1. Have you been to the ER, urgent care clinic since your last visit? Hospitalized since your last visit? No    2. Have you seen or consulted any other health care providers outside of the [address] [place] [address] since your last visit? Include any pap smears or colon screening.  No     Health Maintenance Due   Topic Date Due    EYE EXAM RETINAL OR DILATED Q1  06/08/2017    HEMOGLOBIN A1C Q6M  04/18/2018    GLAUCOMA SCREENING Q2Y  06/08/2018

## 2018-06-04 ENCOUNTER — HOSPITAL ENCOUNTER (OUTPATIENT)
Dept: LAB | Age: 83
Discharge: HOME | End: 2018-06-04
Payer: MEDICARE

## 2018-06-04 ENCOUNTER — OFFICE VISIT (OUTPATIENT)
Dept: FAMILY MEDICINE CLINIC | Age: 83
End: 2018-06-04

## 2018-06-04 VITALS
HEIGHT: 67 IN | OXYGEN SATURATION: 97 % | SYSTOLIC BLOOD PRESSURE: 166 MMHG | DIASTOLIC BLOOD PRESSURE: 73 MMHG | BODY MASS INDEX: 24.99 KG/M2 | RESPIRATION RATE: 14 BRPM | HEART RATE: 60 BPM | TEMPERATURE: 95.8 F | WEIGHT: 159.2 LBS

## 2018-06-04 DIAGNOSIS — R53.1 WEAKNESS: Primary | ICD-10-CM

## 2018-06-04 DIAGNOSIS — E11.9 CONTROLLED TYPE 2 DIABETES MELLITUS WITHOUT COMPLICATION, WITHOUT LONG-TERM CURRENT USE OF INSULIN (HCC): ICD-10-CM

## 2018-06-04 PROCEDURE — 36415 COLL VENOUS BLD VENIPUNCTURE: CPT

## 2018-06-04 PROCEDURE — 80053 COMPREHEN METABOLIC PANEL: CPT

## 2018-06-04 PROCEDURE — 84443 ASSAY THYROID STIM HORMONE: CPT

## 2018-06-04 PROCEDURE — 85025 COMPLETE CBC W/AUTO DIFF WBC: CPT

## 2018-06-04 RX ORDER — ALBUTEROL SULFATE 90 UG/1
2 AEROSOL, METERED RESPIRATORY (INHALATION)
Qty: 1 INHALER | Refills: 12 | Status: SHIPPED | OUTPATIENT
Start: 2018-06-04 | End: 2018-07-09 | Stop reason: SDUPTHER

## 2018-06-04 NOTE — PROGRESS NOTES
Chief Complaint   Patient presents with    Shortness of Breath    Wheezing    Flank Pain     bilateral    area under ribs on each side   ]1. Have you been to the ER, urgent care clinic since your last visit? Hospitalized since your last visit? No    2. Have you seen or consulted any other health care providers outside of the [address] [place] [address] since your last visit? Include any pap smears or colon screening.  No   Health Maintenance Due   Topic Date Due    EYE EXAM RETINAL OR DILATED Q1  06/08/2017    HEMOGLOBIN A1C Q6M  04/18/2018    GLAUCOMA SCREENING Q2Y  06/08/2018

## 2018-06-04 NOTE — PROGRESS NOTES
HISTORY OF PRESENT ILLNESS  Mihir Cummins is a 80 y.o. male. HPI was having wheezing and cough, and dyspnea. Has been coughing all winter. Also gets a chest pain in sternal area, relieved by pushing  On it. Has been dxed with costochondritis, and pain is made worse by pulling on things. Has lost a lot of energy since last winter. Was never a heavy smoker. Couldn't afford Breo. ROS    Physical Exam   Constitutional: He appears well-developed and well-nourished. HENT:   Right Ear: External ear normal.   Left Ear: External ear normal.   Mouth/Throat: Oropharynx is clear and moist.   Neck: No thyromegaly present. Cardiovascular: Normal rate, regular rhythm, normal heart sounds and intact distal pulses. Pulmonary/Chest: Effort normal and breath sounds normal. No respiratory distress. He has no wheezes. Abdominal: Soft. Bowel sounds are normal. He exhibits no distension and no mass. There is no tenderness. There is no guarding. Musculoskeletal: Normal range of motion. He exhibits no edema. Lymphadenopathy:     He has no cervical adenopathy. Nursing note and vitals reviewed. ASSESSMENT and PLAN  Orders Placed This Encounter    CBC WITH AUTOMATED DIFF    TSH 3RD GENERATION    METABOLIC PANEL, COMPREHENSIVE     Diagnoses and all orders for this visit:    1. Weakness  -     CBC WITH AUTOMATED DIFF  -     TSH 3RD GENERATION    2. Controlled type 2 diabetes mellitus without complication, without long-term current use of insulin (HCC)  -     METABOLIC PANEL, COMPREHENSIVE      Follow-up Disposition: Not on File  Recommended seeing cardiology and going for CXR- pt wants to wait til seen at end of June.

## 2018-06-04 NOTE — MR AVS SNAPSHOT
[address] [facility] 
 
 
 [address] [facility] [address] 7 [address] 
[phone] Patient: Raeford Buerger MRN: [id number] EVL:4/61/[id number] Visit Information Date & Time Provider Department Dept. Phone Encounter #  
 6/4/2018 12:15 PM Zayra Metzger MD [facility] [phone] [id number] Your Appointments 6/26/2018  9:00 AM  
ROUTINE CARE with Zayra Metzger MD  
[facility] [address] [facility]) Appt Note: 3m follow up  
 [address] [facility] [place] 7 [address]  
[phone] [address] [facility] [address] Upcoming Health Maintenance Date Due  
 EYE EXAM RETINAL OR DILATED Q1 6/8/2017 HEMOGLOBIN A1C Q6M 4/18/2018 GLAUCOMA SCREENING Q2Y 6/8/2018 Influenza Age 5 to Adult 8/1/2018 FOOT EXAM Q1 10/18/2018 MICROALBUMIN Q1 10/18/2018 LIPID PANEL Q1 10/18/2018 MEDICARE YEARLY EXAM 10/19/2018 DTaP/Tdap/Td series (2 - Td) 5/16/2026 Allergies as of 6/4/2018  Review Complete On: 6/4/2018 By: Zayra Metzger MD  
  
 Severity Noted Reaction Type Reactions Ciprofloxacin  04/18/2014   Side Effect Other (comments) Nauseated, felt fuzzy Erythromycin  04/30/2010    Unknown (comments) Pcn [Penicillins]  04/30/2010    Swelling Current Immunizations  Reviewed on 8/2/2016 Name Date Influenza High Dose Vaccine PF 10/18/2017, 9/12/2016, 11/30/2015, 10/13/2014 Influenza Vaccine 11/18/2013 Influenza Vaccine Split 10/31/2012, 10/17/2011, 10/29/2010 TD Vaccine 8/28/2012 ZZZ-RETIRED (DO NOT USE) Pneumococcal Vaccine (Unspecified Type) 8/28/2012 Not reviewed this visit You Were Diagnosed With   
  
 Codes Comments Weakness    -  Primary ICD-10-CM: R53.1 ICD-9-CM: 780.79 Controlled type 2 diabetes mellitus without complication, without long-term current use of insulin ([facility]     ICD-10-CM: E11.9 ICD-9-CM: 250.00 Vitals BP Pulse Temp Resp Height(growth percentile) Weight(growth percentile) 166/73 60 95.8 °F (35.4 °C) (Oral) 14 5' 7\" (1.702 m) 159 lb 3.2 oz (72.2 kg) SpO2 BMI Smoking Status 97% 24.93 kg/m2 Former Smoker Vitals History BMI and BSA Data Body Mass Index Body Surface Area 24.93 kg/m 2 1.85 m 2 Preferred Pharmacy Pharmacy Name Phone [facility] [address], [address] [place] [address] Avenue [phone] Your Updated Medication List  
  
   
This list is accurate as of 6/4/18  2:01 PM.  Always use your most recent med list.  
  
  
  
  
 * albuterol 90 mcg/actuation inhaler Commonly known as:  PROVENTIL HFA, VENTOLIN HFA, PROAIR HFA Take 2 Puffs by inhalation every four (4) hours as needed for Wheezing. * albuterol 90 mcg/actuation inhaler Commonly known as:  PROVENTIL HFA, VENTOLIN HFA, PROAIR HFA Take 2 Puffs by inhalation every four (4) hours as needed for Shortness of Breath. allopurinol 300 mg tablet Commonly known as:  ZYLOPRIM  
TAKE ONE TABLET BY MOUTH ONCE DAILY TO PREVENT GOUT  
  
 ALPRAZolam 0.5 mg tablet Commonly known as:  XANAX  
TAKE ONE TABLET BY MOUTH TWICE DAILY AS NEEDED FOR ANXIETY  
  
 aspirin 325 mg tablet Commonly known as:  ASPIRIN Take 325 mg by mouth daily. atenolol 50 mg tablet Commonly known as:  TENORMIN  
TAKE 1/2  TABLET BY MOUTH TWICE DAILY FOR BLOOD PRESSURE  
  
 cetirizine 10 mg tablet Commonly known as:  ZYRTEC Take 1 Tab by mouth daily. diphenoxylate-atropine 2.5-0.025 mg per tablet Commonly known as:  LOMOTIL Take 1 Tab by mouth four (4) times daily as needed for Diarrhea. Max Daily Amount: 4 Tabs. finasteride 5 mg tablet Commonly known as:  PROSCAR Take 1 Tab by mouth daily. For prostate  
  
 fluticasone-vilanterol 100-25 mcg/dose inhaler Commonly known as:  BREO ELLIPTA Take 1 Puff by inhalation daily. furosemide 40 mg tablet Commonly known as:  LASIX One tab po daily as needed for fluid  
  
 hydroCHLOROthiazide 25 mg tablet Commonly known as:  HYDRODIURIL  
TAKE ONE TABLET BY MOUTH ONCE DAILY losartan 25 mg tablet Commonly known as:  COZAAR Take 1 Tab by mouth daily. For blood pressure and kidneys MULTIPLE VITAMINS PO Take 1 Tab by mouth daily. pravastatin 20 mg tablet Commonly known as:  PRAVACHOL  
TAKE ONE TABLET BY MOUTH ONCE DAILY AT  NIGHT  FOR  CHOLESTEROL  
  
 PRESERVISION AREDS 2 PO Take  by mouth daily. promethazine-codeine 6.25-10 mg/5 mL syrup Commonly known as:  PHENERGAN with CODEINE Take 5 mL by mouth four (4) times daily as needed for Cough. Max Daily Amount: 20 mL. terazosin 10 mg capsule Commonly known as:  HYTRIN  
TAKE ONE CAPSULE BY MOUTH ONCE DAILY FOR BLOOD PRESSURE AND PROSTATE  
  
 triazolam 0.25 mg tablet Commonly known as:  Easter Apple Take 1 Tab by mouth nightly as needed. Max Daily Amount: 0.25 mg. VITAMIN D3 1,000 unit Cap Generic drug:  cholecalciferol Take 1 Tab by mouth daily. * Notice: This list has 2 medication(s) that are the same as other medications prescribed for you. Read the directions carefully, and ask your doctor or other care provider to review them with you. Prescriptions Sent to Pharmacy Refills  
 albuterol (PROVENTIL HFA, VENTOLIN HFA, PROAIR HFA) 90 mcg/actuation inhaler 12 Sig: Take 2 Puffs by inhalation every four (4) hours as needed for Shortness of Breath. Class: Normal  
 Pharmacy: [address] #: [phone] Route: Inhalation We Performed the Following CBC WITH AUTOMATED DIFF [96966 CPT(R)] METABOLIC PANEL, COMPREHENSIVE [85666 CPT(R)] TSH 3RD GENERATION [18105 CPT(R)] Introducing [facility] & HEALTH SERVICES!    
 [employer] introduces [employer] patient portal. Now you can access parts of your medical record, email your doctor's office, and request medication refills online. 1. In your internet browser, go to [email or web address] 2. Click on the First Time User? Click Here link in the Sign In box. You will see the New Member Sign Up page. 3. Enter your [employer] Access Code exactly as it appears below. You will not need to use this code after youve completed the sign-up process. If you do not sign up before the expiration date, you must request a new code. · [employer] Access Code: [email or web address] Expires: 7/2/2018  5:31 AM 
 
4. Enter the last four digits of your Social Security Number (xxxx) and Date of Birth (mm/dd/yyyy) as indicated and click Submit. You will be taken to the next sign-up page. 5. Create a [employer] ID. This will be your [employer] login ID and cannot be changed, so think of one that is secure and easy to remember. 6. Create a [employer] password. You can change your password at any time. 7. Enter your Password Reset Question and Answer. This can be used at a later time if you forget your password. 8. Enter your e-mail address. You will receive e-mail notification when new information is available in [address] 9. Click Sign Up. You can now view and download portions of your medical record. 10. Click the Download Summary menu link to download a portable copy of your medical information. If you have questions, please visit the Frequently Asked Questions section of the [employer] website. Remember, [employer] is NOT to be used for urgent needs. For medical emergencies, dial 911. Now available from your iPhone and Android! Please provide this summary of care documentation to your next provider. Your primary care clinician is listed as [place]botton Vicky. If you have any questions after today's visit, please call [phone].

## 2018-06-05 LAB
ALBUMIN SERPL-MCNC: 4.4 G/DL (ref 3.5–4.7)
ALBUMIN/GLOB SERPL: 2.3 {RATIO} (ref 1.2–2.2)
ALP SERPL-CCNC: 305 IU/L (ref 39–117)
ALT SERPL-CCNC: 11 IU/L (ref 0–44)
AST SERPL-CCNC: 16 IU/L (ref 0–40)
BASOPHILS # BLD AUTO: 0 X10E3/UL (ref 0–0.2)
BASOPHILS NFR BLD AUTO: 1 %
BILIRUB SERPL-MCNC: 0.7 MG/DL (ref 0–1.2)
BUN SERPL-MCNC: 28 MG/DL (ref 8–27)
BUN/CREAT SERPL: 27 (ref 10–24)
CALCIUM SERPL-MCNC: 9 MG/DL (ref 8.6–10.2)
CHLORIDE SERPL-SCNC: 101 MMOL/L (ref 96–106)
CO2 SERPL-SCNC: 23 MMOL/L (ref 18–29)
CREAT SERPL-MCNC: 1.02 MG/DL (ref 0.76–1.27)
EOSINOPHIL # BLD AUTO: 0.2 X10E3/UL (ref 0–0.4)
EOSINOPHIL NFR BLD AUTO: 3 %
ERYTHROCYTE [DISTWIDTH] IN BLOOD BY AUTOMATED COUNT: 14.9 % (ref 12.3–15.4)
GFR SERPLBLD CREATININE-BSD FMLA CKD-EPI: 67 ML/MIN/1.73
GFR SERPLBLD CREATININE-BSD FMLA CKD-EPI: 78 ML/MIN/1.73
GLOBULIN SER CALC-MCNC: 1.9 G/DL (ref 1.5–4.5)
GLUCOSE SERPL-MCNC: 126 MG/DL (ref 65–99)
HCT VFR BLD AUTO: 35.1 % (ref 37.5–51)
HGB BLD-MCNC: 11.7 G/DL (ref 13–17.7)
IMM GRANULOCYTES # BLD: 0 X10E3/UL (ref 0–0.1)
IMM GRANULOCYTES NFR BLD: 0 %
LYMPHOCYTES # BLD AUTO: 0.9 X10E3/UL (ref 0.7–3.1)
LYMPHOCYTES NFR BLD AUTO: 15 %
MCH RBC QN AUTO: 27.8 PG (ref 26.6–33)
MCHC RBC AUTO-ENTMCNC: 33.3 G/DL (ref 31.5–35.7)
MCV RBC AUTO: 83 FL (ref 79–97)
MONOCYTES # BLD AUTO: 0.4 X10E3/UL (ref 0.1–0.9)
MONOCYTES NFR BLD AUTO: 6 %
NEUTROPHILS # BLD AUTO: 4.3 X10E3/UL (ref 1.4–7)
NEUTROPHILS NFR BLD AUTO: 75 %
PLATELET # BLD AUTO: 166 X10E3/UL (ref 150–379)
POTASSIUM SERPL-SCNC: 3.9 MMOL/L (ref 3.5–5.2)
PROT SERPL-MCNC: 6.3 G/DL (ref 6–8.5)
RBC # BLD AUTO: 4.21 X10E6/UL (ref 4.14–5.8)
SODIUM SERPL-SCNC: 140 MMOL/L (ref 134–144)
TSH SERPL DL<=0.005 MIU/L-ACNC: 2.28 UIU/ML (ref 0.45–4.5)
WBC # BLD AUTO: 5.7 X10E3/UL (ref 3.4–10.8)

## 2018-06-26 ENCOUNTER — DOCUMENTATION ONLY (OUTPATIENT)
Dept: FAMILY MEDICINE CLINIC | Age: 83
End: 2018-06-26

## 2018-06-26 ENCOUNTER — OFFICE VISIT (OUTPATIENT)
Dept: FAMILY MEDICINE CLINIC | Age: 83
End: 2018-06-26

## 2018-06-26 ENCOUNTER — HOSPITAL ENCOUNTER (OUTPATIENT)
Dept: GENERAL RADIOLOGY | Age: 83
Discharge: HOME | End: 2018-06-26
Payer: MEDICARE

## 2018-06-26 ENCOUNTER — HOSPITAL ENCOUNTER (OUTPATIENT)
Dept: CT IMAGING | Age: 83
Discharge: HOME | End: 2018-06-26
Attending: FAMILY MEDICINE
Payer: MEDICARE

## 2018-06-26 VITALS
WEIGHT: 159 LBS | SYSTOLIC BLOOD PRESSURE: 147 MMHG | DIASTOLIC BLOOD PRESSURE: 76 MMHG | HEIGHT: 67 IN | BODY MASS INDEX: 24.96 KG/M2 | RESPIRATION RATE: 14 BRPM | HEART RATE: 65 BPM | TEMPERATURE: 96.2 F | OXYGEN SATURATION: 96 %

## 2018-06-26 DIAGNOSIS — R06.02 SHORTNESS OF BREATH: ICD-10-CM

## 2018-06-26 DIAGNOSIS — R97.20 ELEVATED PSA: ICD-10-CM

## 2018-06-26 DIAGNOSIS — J90 PLEURAL EFFUSION: ICD-10-CM

## 2018-06-26 DIAGNOSIS — R04.2 HEMOPTYSIS: ICD-10-CM

## 2018-06-26 DIAGNOSIS — R91.8 LUNG MASS: Primary | ICD-10-CM

## 2018-06-26 PROCEDURE — 71260 CT THORAX DX C+: CPT

## 2018-06-26 PROCEDURE — 74011250636 HC RX REV CODE- 250/636: Performed by: FAMILY MEDICINE

## 2018-06-26 PROCEDURE — 74011636320 HC RX REV CODE- 636/320: Performed by: FAMILY MEDICINE

## 2018-06-26 PROCEDURE — 71046 X-RAY EXAM CHEST 2 VIEWS: CPT

## 2018-06-26 RX ORDER — SODIUM CHLORIDE 0.9 % (FLUSH) 0.9 %
10 SYRINGE (ML) INJECTION
Status: COMPLETED | OUTPATIENT
Start: 2018-06-26 | End: 2018-06-26

## 2018-06-26 RX ORDER — SODIUM CHLORIDE 9 MG/ML
50 INJECTION, SOLUTION INTRAVENOUS
Status: COMPLETED | OUTPATIENT
Start: 2018-06-26 | End: 2018-06-26

## 2018-06-26 RX ADMIN — SODIUM CHLORIDE 50 ML/HR: 900 INJECTION, SOLUTION INTRAVENOUS at 15:38

## 2018-06-26 RX ADMIN — IOPAMIDOL 100 ML: 755 INJECTION, SOLUTION INTRAVENOUS at 15:38

## 2018-06-26 RX ADMIN — Medication 10 ML: at 15:38

## 2018-06-26 NOTE — PROGRESS NOTES
HISTORY OF PRESENT ILLNESS  Valorie Renteria is a 80 y.o. male. HPI Has been having some mild dyspnea the last few weeks when tries to shovel gravel. Coughed up some blood last night, happened a few times. No co bone pain. Has had some mild cough if bends over. Does feel somewhat weak and tired. Walking up hill causes dyspnea, but no real chest tightness. ROS    Physical Exam   Constitutional: He appears well-developed and well-nourished. HENT:   Right Ear: External ear normal.   Left Ear: External ear normal.   Mouth/Throat: Oropharynx is clear and moist.   Neck: No thyromegaly present. Cardiovascular: Normal rate, regular rhythm, normal heart sounds and intact distal pulses. Pulmonary/Chest: Effort normal and breath sounds normal. No respiratory distress. He has no wheezes. Abdominal: Soft. Bowel sounds are normal. He exhibits no distension and no mass. There is no tenderness. There is no guarding. Musculoskeletal: Normal range of motion. He exhibits no edema. Lymphadenopathy:     He has no cervical adenopathy. Skin:   L Supraclavicular node present   Nursing note and vitals reviewed. ASSESSMENT and PLAN  Orders Placed This Encounter    NM BONE SCAN [address] [facility] BODY    CTA CHEST W OR W WO CONT    XR CHEST PA LAT    CT CHEST W CONT    AMB POC EKG ROUTINE W/ 12 LEADS, INTER & REP     Diagnoses and all orders for this visit:    1. Lung mass    2. Hemoptysis  -     CTA CHEST W OR W WO CONT; Future  -     XR CHEST PA LAT; Future    3. Elevated PSA  -     NM BONE SCAN WH BODY; Future    4. Shortness of breath  -     AMB POC EKG ROUTINE W/ 12 LEADS, INTER & REP  -     CTA CHEST W OR W WO CONT; Future  -     XR CHEST PA LAT;  Future  -     CT CHEST W CONT; Future      Follow-up Disposition: Not on File

## 2018-06-26 NOTE — PROGRESS NOTES
Order placed per  for a chest xray. Pt had done at [facility]. Mass found in chest. Called  and schedule for CT SCAN of chest today. appt given for 3p.  Pt already at hospital and aware to check in at 230p

## 2018-06-26 NOTE — PROGRESS NOTES
Chief Complaint   Patient presents with    Other     hemoptysis    Cough    Wheezing    Breathing Problem     1. Have you been to the ER, urgent care clinic since your last visit? Hospitalized since your last visit? No    2. Have you seen or consulted any other health care providers outside of the [facility] since your last visit? Include any pap smears or colon screening.  No     Health Maintenance Due   Topic Date Due    EYE EXAM RETINAL OR DILATED Q1  06/08/2017    HEMOGLOBIN A1C Q6M  04/18/2018    GLAUCOMA SCREENING Q2Y  06/08/2018

## 2018-06-27 ENCOUNTER — DOCUMENTATION ONLY (OUTPATIENT)
Dept: FAMILY MEDICINE CLINIC | Age: 83
End: 2018-06-27

## 2018-06-27 NOTE — PROGRESS NOTES
Pt scheduled for US thoracentesis on Friday June 29th at 1pm. Pt to arrived at [id number] [address] [facility] outpatient at 12noon. Pt called and verbalized understanding.

## 2018-06-29 ENCOUNTER — HOSPITAL ENCOUNTER (OUTPATIENT)
Dept: GENERAL RADIOLOGY | Age: 83
Discharge: HOME | End: 2018-06-29
Attending: RADIOLOGY
Payer: MEDICARE

## 2018-06-29 ENCOUNTER — HOSPITAL ENCOUNTER (OUTPATIENT)
Dept: ULTRASOUND IMAGING | Age: 83
Discharge: HOME | End: 2018-06-29
Attending: FAMILY MEDICINE
Payer: MEDICARE

## 2018-06-29 VITALS
OXYGEN SATURATION: 96 % | DIASTOLIC BLOOD PRESSURE: 57 MMHG | RESPIRATION RATE: 16 BRPM | HEART RATE: 67 BPM | TEMPERATURE: 98.1 F | SYSTOLIC BLOOD PRESSURE: 121 MMHG | BODY MASS INDEX: 24.8 KG/M2 | HEIGHT: 67 IN | WEIGHT: 158 LBS

## 2018-06-29 DIAGNOSIS — J90 PLEURAL EFFUSION: ICD-10-CM

## 2018-06-29 DIAGNOSIS — R91.8 LUNG MASS: ICD-10-CM

## 2018-06-29 PROCEDURE — 88305 TISSUE EXAM BY PATHOLOGIST: CPT | Performed by: FAMILY MEDICINE

## 2018-06-29 PROCEDURE — 88342 IMHCHEM/IMCYTCHM 1ST ANTB: CPT | Performed by: FAMILY MEDICINE

## 2018-06-29 PROCEDURE — 32555 ASPIRATE PLEURA W/ IMAGING: CPT

## 2018-06-29 PROCEDURE — 74011000250 HC RX REV CODE- 250: Performed by: RADIOLOGY

## 2018-06-29 PROCEDURE — 88341 IMHCHEM/IMCYTCHM EA ADD ANTB: CPT | Performed by: FAMILY MEDICINE

## 2018-06-29 PROCEDURE — 71045 X-RAY EXAM CHEST 1 VIEW: CPT

## 2018-06-29 PROCEDURE — 88112 CYTOPATH CELL ENHANCE TECH: CPT | Performed by: FAMILY MEDICINE

## 2018-06-29 RX ORDER — LIDOCAINE HYDROCHLORIDE 10 MG/ML
5 INJECTION, SOLUTION EPIDURAL; INFILTRATION; INTRACAUDAL; PERINEURAL
Status: COMPLETED | OUTPATIENT
Start: 2018-06-29 | End: 2018-06-29

## 2018-06-29 RX ADMIN — LIDOCAINE HYDROCHLORIDE 5 ML: 10 INJECTION, SOLUTION EPIDURAL; INFILTRATION; INTRACAUDAL; PERINEURAL at 16:00

## 2018-06-29 NOTE — IP AVS SNAPSHOT
Summary of Care Report The Summary of Care report has been created to help improve care coordination. Users with access to [employer] or [facility] [address] (Web-based application) may access additional patient information including the Discharge Summary. If you are not currently a [address] user and need more information, please call the number listed below in the [place] [address] section and ask to be connected with Medical Records. Facility Information Name Address Phone [address] [address] [phone] Patient Information Patient Name Sex  Carisa Tinajero ([id number]) Male 1933 Discharge Information Admitting Provider Service Area Unit  
 (none) 8 [facility] / [phone] Discharge Provider Discharge Date/Time Discharge Disposition Destination (none) (none) (none) (none) Patient Language Language ENGLISH [13] Hospital Problems as of 2018  Reviewed: 2018  9:52 AM by Brittany Rich MD  
 None Non-Hospital Problems as of 2018  Reviewed: 2018  9:52 AM by Brittany Rich MD  
  
  
  
 Class Noted - Resolved Last Modified Active Problems BP (high blood pressure)  2010 - Present 2010 by Eri Syed Entered by Eri Syed Gout  2010 - Present 2010 by Eri Syed Entered by Eri Syed Elevated PSA  2010 - Present 2010 by Eri Syed Entered by Eri Syed Hernia of unspecified site of abdominal cavity without mention of obstruction or gangrene  2010 - Present 2010 by Eri Syed Entered by Eri Syed   White coat hypertension  2015 - Present 2015 by Brittany Rich MD  
  Entered by Brittany Rich MD  
 Diabetes mellitus type 2, diet-controlled ([facility] [address]  5/23/2016 - Present 5/23/2016 by German Luciano MD  
  Entered by German Luciano MD  
  Diabetes mellitus, type 2 [facility])  Unknown - Present 5/23/2016 by German Luciano MD  
  Entered by German Luciano MD  
  Type 2 diabetes mellitus with nephropathy ([facility] [address]  1/11/2018 - Present 1/11/2018 by German Luciano MD  
  Entered by German Luciano MD  
  
You are allergic to the following Allergen Reactions Ciprofloxacin Other (comments) Nauseated, felt fuzzy Erythromycin Unknown (comments) Pcn (Penicillins) Swelling Current Discharge Medication List  
  
ASK your doctor about these medications Dose & Instructions Dispensing Information Comments * albuterol 90 mcg/actuation inhaler Commonly known as:  PROVENTIL HFA, VENTOLIN HFA, PROAIR HFA Dose:  2 Puff Take 2 Puffs by inhalation every four (4) hours as needed for Wheezing. Quantity:  1 Inhaler Refills:  12  
   
 * albuterol 90 mcg/actuation inhaler Commonly known as:  PROVENTIL HFA, VENTOLIN HFA, PROAIR HFA Dose:  2 Puff Take 2 Puffs by inhalation every four (4) hours as needed for Shortness of Breath. Quantity:  1 Inhaler Refills:  12  
   
 allopurinol 300 mg tablet Commonly known as:  ZYLOPRIM  
 TAKE ONE TABLET BY MOUTH ONCE DAILY TO PREVENT GOUT  
 Quantity:  30 Tab Refills:  0 Please consider 90 day supplies to promote better adherence ALPRAZolam 0.5 mg tablet Commonly known as:  XANAX  
 TAKE ONE TABLET BY MOUTH TWICE DAILY AS NEEDED FOR ANXIETY Quantity:  60 Tab Refills:  3  
   
 aspirin 325 mg tablet Commonly known as:  ASPIRIN Dose:  325 mg Take 325 mg by mouth daily. Refills:  0  
   
 atenolol 50 mg tablet Commonly known as:  TENORMIN  
 TAKE 1/2  TABLET BY MOUTH TWICE DAILY FOR BLOOD PRESSURE Quantity:  180 Tab Refills:  3  
   
 cetirizine 10 mg tablet Commonly known as:  ZYRTEC  
 Dose:  10 mg Take 1 Tab by mouth daily. Quantity:  15 Tab Refills:  5  
   
 diphenoxylate-atropine 2.5-0.025 mg per tablet Commonly known as:  LOMOTIL Dose:  1 Tab Take 1 Tab by mouth four (4) times daily as needed for Diarrhea. Max Daily Amount: 4 Tabs. Quantity:  30 Tab Refills:  0  
   
 finasteride 5 mg tablet Commonly known as:  PROSCAR Dose:  5 mg Take 1 Tab by mouth daily. For prostate Quantity:  30 Tab Refills:  12  
   
 fluticasone-vilanterol 100-25 mcg/dose inhaler Commonly known as:  BREO ELLIPTA Dose:  1 Puff Take 1 Puff by inhalation daily. Quantity:  1 Inhaler Refills:  12  
   
 furosemide 40 mg tablet Commonly known as:  LASIX One tab po daily as needed for fluid Quantity:  30 Tab Refills:  5  
   
 hydroCHLOROthiazide 25 mg tablet Commonly known as:  HYDRODIURIL  
 TAKE ONE TABLET BY MOUTH ONCE DAILY Quantity:  90 Tab Refills:  3  
   
 losartan 25 mg tablet Commonly known as:  COZAAR Dose:  25 mg Take 1 Tab by mouth daily. For blood pressure and kidneys Quantity:  30 Tab Refills:  12 MULTIPLE VITAMINS PO Dose:  1 Tab Take 1 Tab by mouth daily. Refills:  0  
   
 pravastatin 20 mg tablet Commonly known as:  PRAVACHOL  
 TAKE ONE TABLET BY MOUTH ONCE DAILY AT  NIGHT  FOR  CHOLESTEROL Quantity:  90 Tab Refills:  3 PRESERVISION AREDS 2 PO Take  by mouth daily. Refills:  0  
   
 promethazine-codeine 6.25-10 mg/5 mL syrup Commonly known as:  PHENERGAN with CODEINE Dose:  1 tsp Take 5 mL by mouth four (4) times daily as needed for Cough. Max Daily Amount: 20 mL. Quantity:  180 mL Refills:  1  
   
 terazosin 10 mg capsule Commonly known as:  HYTRIN  
 TAKE ONE CAPSULE BY MOUTH ONCE DAILY FOR BLOOD PRESSURE AND PROSTATE Quantity:  90 Cap Refills:  12  
   
 triazolam 0.25 mg tablet Commonly known as:  Jovi Box  Dose:  0.25 mg  
 Take 1 Tab by mouth nightly as needed. Max Daily Amount: 0.25 mg.  
 Quantity:  90 Tab Refills:  1 VITAMIN D3 1,000 unit Cap Generic drug:  cholecalciferol Dose:  1 Tab Take 1 Tab by mouth daily. Refills:  0  
   
 * Notice: This list has 2 medication(s) that are the same as other medications prescribed for you. Read the directions carefully, and ask your doctor or other care provider to review them with you. Current Immunizations Name Date Influenza High Dose Vaccine PF 10/18/2017, 9/12/2016, 11/30/2015, 10/13/2014 Influenza Vaccine 11/18/2013 Influenza Vaccine Split 10/31/2012, 10/17/2011, 10/29/2010 TD Vaccine 8/28/2012 ZZZ-RETIRED (DO NOT USE) Pneumococcal Vaccine (Unspecified Type) 8/28/2012 Follow-up Information None Discharge Instructions Danielle Bergman [facility] Special Procedures/Radiology Department Radiologist: Omar Yang Date: June 29, 2018 Thoracentesis Discharge Instructions You may have an aching pain in the puncture site tonight. Take Tylenol, as directed on the label, for pain or discomfort. Avoid ibuprofen (Advil, Motrin) and aspirin products for the next 48 hours as these drugs may cause you to bleed. Resume your previous diet and follow the medication reconciliation form. Rest for the next 24 hours. If you experience shortness of breath (other than your normal breathing pattern) difficulty breathing, or have severe chest pain, call 911 and go to the nearest Emergency Room. Be sure to follow up with your referring physician as soon as possible Chart Review Routing History Recipient Method Report Sent By Trino Donovan MD  
Phone: [phone] In [facility] EKG CUSTOM REPORT Loulou Crespo [[id number]] 11/5/2012  1:19 PM 11/05/2012 Christina Griffin MD  
Phone: [phone] In [facility] EKG CUSTOM REPORT Betty Cabrera [[id number]] 11/5/2012  1:19 PM 11/05/2012

## 2018-06-29 NOTE — DISCHARGE INSTRUCTIONS
[address]  Special Procedures/Radiology Department    Radiologist: Panda Alejandra    Date: June 29, 2018      Thoracentesis Discharge Instructions    You may have an aching pain in the puncture site tonight. Take Tylenol, as directed on the label, for pain or discomfort. Avoid ibuprofen (Advil, Motrin) and aspirin products for the next 48 hours as these drugs may cause you to bleed. Resume your previous diet and follow the medication reconciliation form. Rest for the next 24 hours. If you experience shortness of breath (other than your normal breathing pattern) difficulty breathing, or have severe chest pain, call 911 and go to the nearest Emergency Room.     Be sure to follow up with your referring physician as soon as possible

## 2018-06-29 NOTE — PROGRESS NOTES
Removed 1500 mL fluid during thoracentesis, post Chest Xray complete. Per MD Lorrie Shafer patient can leave.

## 2018-07-09 ENCOUNTER — OFFICE VISIT (OUTPATIENT)
Dept: FAMILY MEDICINE CLINIC | Age: 83
End: 2018-07-09

## 2018-07-09 VITALS
TEMPERATURE: 98.1 F | HEIGHT: 67 IN | OXYGEN SATURATION: 96 % | WEIGHT: 157.2 LBS | BODY MASS INDEX: 24.67 KG/M2 | RESPIRATION RATE: 14 BRPM | DIASTOLIC BLOOD PRESSURE: 63 MMHG | HEART RATE: 73 BPM | SYSTOLIC BLOOD PRESSURE: 120 MMHG

## 2018-07-09 DIAGNOSIS — C34.11 MALIGNANT NEOPLASM OF UPPER LOBE OF RIGHT LUNG (HCC): ICD-10-CM

## 2018-07-09 DIAGNOSIS — M25.562 CHRONIC PAIN OF LEFT KNEE: Primary | ICD-10-CM

## 2018-07-09 DIAGNOSIS — G89.29 CHRONIC PAIN OF LEFT KNEE: Primary | ICD-10-CM

## 2018-07-09 RX ORDER — ALBUTEROL SULFATE 90 UG/1
2 AEROSOL, METERED RESPIRATORY (INHALATION)
Qty: 1 INHALER | Refills: 12 | Status: SHIPPED | OUTPATIENT
Start: 2018-07-09 | End: 2018-07-09 | Stop reason: CLARIF

## 2018-07-09 RX ORDER — ALBUTEROL SULFATE 90 UG/1
2 AEROSOL, METERED RESPIRATORY (INHALATION)
Qty: 1 INHALER | Refills: 12 | Status: SHIPPED | OUTPATIENT
Start: 2018-07-09 | End: 2018-12-04 | Stop reason: ALTCHOICE

## 2018-07-09 NOTE — PROGRESS NOTES
Chief Complaint   Patient presents with    Abnormal Lab Results    Knee Pain     left     1. Have you been to the ER, urgent care clinic since your last visit? Hospitalized since your last visit? No    2. Have you seen or consulted any other health care providers outside of the [address] [place] [address] since your last visit? Include any pap smears or colon screening.  No     Health Maintenance Due   Topic Date Due    EYE EXAM RETINAL OR DILATED Q1  06/08/2017    HEMOGLOBIN A1C Q6M  04/18/2018    GLAUCOMA SCREENING Q2Y  06/08/2018

## 2018-07-09 NOTE — PROGRESS NOTES
HISTORY OF PRESENT ILLNESS  Tanya Galeana is a 80 y.o. male. HPI In for followup. Had 1600 ccs of bloody fluid drained off 10 days ago, seemed to help breathing somewhat. Still having some wheezing. Has also been having pain in L knee, worse when walks. Knee feels like it will collapse at times. Has been taking tylenol x 2 every 4 hours, doesn't really help that much. Smoked for a few years as a teenager, but never inhaled. ROS    Physical Exam   Constitutional: He appears well-developed and well-nourished. HENT:   Right Ear: External ear normal.   Left Ear: External ear normal.   Mouth/Throat: Oropharynx is clear and moist.   Neck: No thyromegaly present. Cardiovascular: Normal rate, regular rhythm, normal heart sounds and intact distal pulses. Pulmonary/Chest: Effort normal and breath sounds normal. No respiratory distress. He has no wheezes. Abdominal: Soft. Bowel sounds are normal. He exhibits no distension and no mass. There is no tenderness. There is no guarding. Musculoskeletal: Normal range of motion. He exhibits no edema. L knee- moderate crepitus   Lymphadenopathy:     He has no cervical adenopathy. Nursing note and vitals reviewed. ASSESSMENT and PLAN  Orders Placed This Encounter    REFERRAL TO ONCOLOGY    DISCONTD: albuterol (PROVENTIL HFA, VENTOLIN HFA, PROAIR HFA) 90 mcg/actuation inhaler    albuterol (PROVENTIL HFA, VENTOLIN HFA, PROAIR HFA) 90 mcg/actuation inhaler     Diagnoses and all orders for this visit:    1. Chronic pain of left knee    2. Malignant neoplasm of upper lobe of right lung (HCC)  -     REFERRAL TO ONCOLOGY    Other orders  -     albuterol (PROVENTIL HFA, VENTOLIN HFA, PROAIR HFA) 90 mcg/actuation inhaler; Take 2 Puffs by inhalation every four (4) hours as needed for Shortness of Breath. Follow-up Disposition:  Return in about 2 weeks (around 7/23/2018). Have recommended to pt seeing oncology to discuss options.  I dont think he will take treatment, but may go for oncology opinion.

## 2018-07-09 NOTE — MR AVS SNAPSHOT
[address] [facility] [address] 
 
 
 [address] [address] 
[phone] Patient: Efrain Russell MRN: [id number] BAV:[id number] Visit Information Date & Time Provider Department Dept. Phone Encounter #  
 7/9/2018  4:45 PM Elena Baltazar MD [facility] [phone] [id number] Follow-up Instructions Return in about 2 weeks (around 7/23/2018). Your Appointments 12/4/2018  9:00 AM  
ROUTINE CARE with Elena Baltazar MD  
[facility]: 6m follow up  
 [address] [place] [address]  
[phone] [id number] Upcoming Health Maintenance Date Due  
 EYE EXAM RETINAL OR DILATED Q1 6/8/2017 HEMOGLOBIN A1C Q6M 4/18/2018 GLAUCOMA SCREENING Q2Y 6/8/2018 Influenza Age 5 to Adult 8/1/2018 FOOT EXAM Q1 10/18/2018 MICROALBUMIN Q1 10/18/2018 LIPID PANEL Q1 10/18/2018 MEDICARE YEARLY EXAM 10/19/2018 DTaP/Tdap/Td series (2 - Td) 5/16/2026 Allergies as of 7/9/2018  Review Complete On: 7/9/2018 By: Elena Baltazar MD  
  
 Severity Noted Reaction Type Reactions Ciprofloxacin  04/18/2014   Side Effect Other (comments) Nauseated, felt fuzzy Erythromycin  04/30/2010    Unknown (comments) Pcn [Penicillins]  04/30/2010    Swelling Current Immunizations  Reviewed on 8/2/2016 Name Date Influenza High Dose Vaccine PF 10/18/2017, 9/12/2016, 11/30/2015, 10/13/2014 Influenza Vaccine 11/18/2013 Influenza Vaccine Split 10/31/2012, 10/17/2011, 10/29/2010 TD Vaccine 8/28/2012 ZZZ-RETIRED (DO NOT USE) Pneumococcal Vaccine (Unspecified Type) 8/28/2012 Not reviewed this visit You Were Diagnosed With   
  
 Codes Comments Chronic pain of left knee    -  Primary ICD-10-CM: M25.562, U11.83 ICD-9-CM: 719.46, 338.29   
 Malignant neoplasm of upper lobe of right lung [facility])     ICD-10-CM: C34.11 ICD-9-CM: 162.3 Vitals BP Pulse Temp Resp Height(growth percentile) Weight(growth percentile) 120/63 73 98.1 °F (36.7 °C) (Oral) 14 5' 7\" (1.702 m) 157 lb 3.2 oz (71.3 kg) SpO2 BMI Smoking Status 96% 24.62 kg/m2 Former Smoker BMI and BSA Data Body Mass Index Body Surface Area  
 24.62 kg/m 2 1.84 m 2 Preferred Pharmacy Pharmacy Name Phone [facility] [address] , [address] [place] [address] Avenue [phone] Your Updated Medication List  
  
   
This list is accurate as of 7/9/18  5:46 PM.  Always use your most recent med list.  
  
  
  
  
 albuterol 90 mcg/actuation inhaler Commonly known as:  PROVENTIL HFA, VENTOLIN HFA, PROAIR HFA Take 2 Puffs by inhalation every four (4) hours as needed for Shortness of Breath. allopurinol 300 mg tablet Commonly known as:  ZYLOPRIM  
TAKE ONE TABLET BY MOUTH ONCE DAILY TO PREVENT GOUT  
  
 ALPRAZolam 0.5 mg tablet Commonly known as:  XANAX  
TAKE ONE TABLET BY MOUTH TWICE DAILY AS NEEDED FOR ANXIETY  
  
 aspirin 325 mg tablet Commonly known as:  ASPIRIN Take 325 mg by mouth daily. atenolol 50 mg tablet Commonly known as:  TENORMIN  
TAKE 1/2  TABLET BY MOUTH TWICE DAILY FOR BLOOD PRESSURE  
  
 cetirizine 10 mg tablet Commonly known as:  ZYRTEC Take 1 Tab by mouth daily. diphenoxylate-atropine 2.5-0.025 mg per tablet Commonly known as:  LOMOTIL Take 1 Tab by mouth four (4) times daily as needed for Diarrhea. Max Daily Amount: 4 Tabs. finasteride 5 mg tablet Commonly known as:  PROSCAR Take 1 Tab by mouth daily. For prostate  
  
 fluticasone-vilanterol 100-25 mcg/dose inhaler Commonly known as:  BREO ELLIPTA Take 1 Puff by inhalation daily. furosemide 40 mg tablet Commonly known as:  LASIX One tab po daily as needed for fluid  
  
 hydroCHLOROthiazide 25 mg tablet Commonly known as:  HYDRODIURIL  
TAKE ONE TABLET BY MOUTH ONCE DAILY losartan 25 mg tablet Commonly known as:  COZAAR Take 1 Tab by mouth daily. For blood pressure and kidneys MULTIPLE VITAMINS PO Take 1 Tab by mouth daily. pravastatin 20 mg tablet Commonly known as:  PRAVACHOL  
TAKE ONE TABLET BY MOUTH ONCE DAILY AT  NIGHT  FOR  CHOLESTEROL  
  
 PRESERVISION AREDS 2 PO Take  by mouth daily. promethazine-codeine 6.25-10 mg/5 mL syrup Commonly known as:  PHENERGAN with CODEINE Take 5 mL by mouth four (4) times daily as needed for Cough. Max Daily Amount: 20 mL. terazosin 10 mg capsule Commonly known as:  HYTRIN  
TAKE ONE CAPSULE BY MOUTH ONCE DAILY FOR BLOOD PRESSURE AND PROSTATE  
  
 triazolam 0.25 mg tablet Commonly known as:  Jordy Herd Take 1 Tab by mouth nightly as needed. Max Daily Amount: 0.25 mg. VITAMIN D3 1,000 unit Cap Generic drug:  cholecalciferol Take 1 Tab by mouth daily. Prescriptions Printed Refills  
 albuterol (PROVENTIL HFA, VENTOLIN HFA, PROAIR HFA) 90 mcg/actuation inhaler 12 Sig: Take 2 Puffs by inhalation every four (4) hours as needed for Shortness of Breath. Class: Print Route: Inhalation We Performed the Following REFERRAL TO ONCOLOGY [HOS94 Custom] Follow-up Instructions Return in about 2 weeks (around 7/23/2018). Referral Information Referral ID Referred By Referred To  
  
 [id number] Inés Lundberg Not Available Visits Status Start Date End Date 1 New Request 7/9/18 7/9/19 If your referral has a status of pending review or denied, additional information will be sent to support the outcome of this decision. Introducing [facility]! [facility] introduces [employer] patient portal. Now you can access parts of your medical record, email your doctor's office, and request medication refills online.    
 
1. In your internet browser, go to [email or web address] 2. Click on the First Time User? Click Here link in the Sign In box. You will see the New Member Sign Up page. 3. Enter your [employer] Access Code exactly as it appears below. You will not need to use this code after youve completed the sign-up process. If you do not sign up before the expiration date, you must request a new code. · [employer] Access Code: [email or web address] Expires: 10/7/2018  5:46 PM 
 
4. Enter the last four digits of your Social Security Number (xxxx) and Date of Birth (mm/dd/yyyy) as indicated and click Submit. You will be taken to the next sign-up page. 5. Create a [employer] ID. This will be your [employer] login ID and cannot be changed, so think of one that is secure and easy to remember. 6. Create a [employer] password. You can change your password at any time. 7. Enter your Password Reset Question and Answer. This can be used at a later time if you forget your password. 8. Enter your e-mail address. You will receive e-mail notification when new information is available in [address] 9. Click Sign Up. You can now view and download portions of your medical record. 10. Click the Download Summary menu link to download a portable copy of your medical information. If you have questions, please visit the Frequently Asked Questions section of the [employer] website. Remember, [employer] is NOT to be used for urgent needs. For medical emergencies, dial 911. Now available from your iPhone and Android! Please provide this summary of care documentation to your next provider. Your primary care clinician is listed as Brian Francis. If you have any questions after today's visit, please call [phone].

## 2018-07-19 ENCOUNTER — HOSPITAL ENCOUNTER (OUTPATIENT)
Dept: NUCLEAR MEDICINE | Age: 83
Discharge: HOME | End: 2018-07-19
Attending: INTERNAL MEDICINE
Payer: MEDICARE

## 2018-07-19 ENCOUNTER — HOSPITAL ENCOUNTER (OUTPATIENT)
Dept: CT IMAGING | Age: 83
Discharge: HOME | End: 2018-07-19
Attending: INTERNAL MEDICINE
Payer: MEDICARE

## 2018-07-19 DIAGNOSIS — C34.11 MALIGNANT NEOPLASM OF UPPER LOBE, RIGHT BRONCHUS OR LUNG (HCC): ICD-10-CM

## 2018-07-19 PROCEDURE — 74177 CT ABD & PELVIS W/CONTRAST: CPT

## 2018-07-19 PROCEDURE — 74011250636 HC RX REV CODE- 250/636: Performed by: INTERNAL MEDICINE

## 2018-07-19 PROCEDURE — 78306 BONE IMAGING WHOLE BODY: CPT

## 2018-07-19 PROCEDURE — 74011636320 HC RX REV CODE- 636/320: Performed by: INTERNAL MEDICINE

## 2018-07-19 RX ORDER — SODIUM CHLORIDE 0.9 % (FLUSH) 0.9 %
10 SYRINGE (ML) INJECTION
Status: COMPLETED | OUTPATIENT
Start: 2018-07-19 | End: 2018-07-19

## 2018-07-19 RX ORDER — SODIUM CHLORIDE 9 MG/ML
50 INJECTION, SOLUTION INTRAVENOUS
Status: COMPLETED | OUTPATIENT
Start: 2018-07-19 | End: 2018-07-19

## 2018-07-19 RX ADMIN — IOHEXOL 50 ML: 240 INJECTION, SOLUTION INTRATHECAL; INTRAVASCULAR; INTRAVENOUS; ORAL at 12:45

## 2018-07-19 RX ADMIN — SODIUM CHLORIDE 50 ML/HR: 900 INJECTION, SOLUTION INTRAVENOUS at 12:48

## 2018-07-19 RX ADMIN — Medication 10 ML: at 12:45

## 2018-07-19 RX ADMIN — IOPAMIDOL 100 ML: 755 INJECTION, SOLUTION INTRAVENOUS at 12:45

## 2018-07-20 ENCOUNTER — HOSPITAL ENCOUNTER (OUTPATIENT)
Dept: GENERAL RADIOLOGY | Age: 83
Discharge: HOME | End: 2018-07-20
Attending: RADIOLOGY
Payer: MEDICARE

## 2018-07-20 ENCOUNTER — HOSPITAL ENCOUNTER (OUTPATIENT)
Dept: ULTRASOUND IMAGING | Age: 83
Discharge: HOME | End: 2018-07-20
Attending: INTERNAL MEDICINE
Payer: MEDICARE

## 2018-07-20 VITALS
DIASTOLIC BLOOD PRESSURE: 79 MMHG | SYSTOLIC BLOOD PRESSURE: 124 MMHG | TEMPERATURE: 98 F | HEIGHT: 66 IN | HEART RATE: 84 BPM | BODY MASS INDEX: 25.23 KG/M2 | WEIGHT: 157 LBS | RESPIRATION RATE: 18 BRPM | OXYGEN SATURATION: 100 %

## 2018-07-20 DIAGNOSIS — J91.0 MALIGNANT PLEURAL EFFUSION: ICD-10-CM

## 2018-07-20 PROCEDURE — 32555 ASPIRATE PLEURA W/ IMAGING: CPT

## 2018-07-20 PROCEDURE — 71045 X-RAY EXAM CHEST 1 VIEW: CPT

## 2018-07-20 PROCEDURE — 74011000250 HC RX REV CODE- 250: Performed by: INTERNAL MEDICINE

## 2018-07-20 RX ORDER — LIDOCAINE HYDROCHLORIDE 10 MG/ML
10 INJECTION INFILTRATION; PERINEURAL
Status: COMPLETED | OUTPATIENT
Start: 2018-07-20 | End: 2018-07-20

## 2018-07-20 RX ADMIN — LIDOCAINE HYDROCHLORIDE 10 ML: 10 INJECTION, SOLUTION INFILTRATION; PERINEURAL at 16:00

## 2018-07-20 NOTE — PROGRESS NOTES
Pt resting comfortably on stretcher. VSS. No C/O pain. Dressing to site D&I. No bleeding or hematoma noted to site. HOB elevated. PCXR complete. MD has read PCXR and per MD pt may be discharged. Discharge instructions given. Copy on chart and copy given to pt. Pt and family verbalize understanding. Taken to car by wheelchair and taken home by family. NAD noted at time of discharge.

## 2018-07-20 NOTE — IP AVS SNAPSHOT
[address] [facility] 
[phone] Patient: Sera Barajas MRN: [id number] SOP:2/07/0908 About your hospitalization You were admitted on:  July 20, 2018 You last received care in the:  [facility] Ultrasound You were discharged on:  July 20, 2018 Why you were hospitalized Your primary diagnosis was:  Not on File Follow-up Information None Your Scheduled Appointments Tuesday July 24, 2018  2:45 PM EDT ROUTINE CARE with Herberth Ibarra MD  
[facility] [address] [facility] [address] [id number]  
[phone] Discharge Orders None A check jc indicates which time of day the medication should be taken. My Medications ASK your doctor about these medications Instructions Each Dose to Equal  
 Morning Noon Evening Bedtime  
 albuterol 90 mcg/actuation inhaler Commonly known as:  PROVENTIL HFA, VENTOLIN HFA, PROAIR HFA Your last dose was: Your next dose is: Take 2 Puffs by inhalation every four (4) hours as needed for Shortness of Breath. 2 Puff  
    
   
   
   
  
 allopurinol 300 mg tablet Commonly known as:  Aline Willis Your last dose was: Your next dose is: TAKE ONE TABLET BY MOUTH ONCE DAILY TO PREVENT GOUT  
     
   
   
   
  
 ALPRAZolam 0.5 mg tablet Commonly known as:  Wendall Noe Your last dose was: Your next dose is: TAKE ONE TABLET BY MOUTH TWICE DAILY AS NEEDED FOR ANXIETY  
     
   
   
   
  
 aspirin 325 mg tablet Commonly known as:  ASPIRIN Your last dose was: Your next dose is: Take 325 mg by mouth daily. 325 mg  
    
   
   
   
  
 atenolol 50 mg tablet Commonly known as:  TENORMIN Your last dose was: Your next dose is: TAKE 1/2  TABLET BY MOUTH TWICE DAILY FOR BLOOD PRESSURE  
     
   
   
   
  
 cetirizine 10 mg tablet Commonly known as:  ZYRTEC Your last dose was: Your next dose is: Take 1 Tab by mouth daily. 10 mg  
    
   
   
   
  
 diphenoxylate-atropine 2.5-0.025 mg per tablet Commonly known as:  LOMOTIL Your last dose was: Your next dose is: Take 1 Tab by mouth four (4) times daily as needed for Diarrhea. Max Daily Amount: 4 Tabs. 1 Tab  
    
   
   
   
  
 finasteride 5 mg tablet Commonly known as:  PROSCAR Your last dose was: Your next dose is: Take 1 Tab by mouth daily. For prostate 5 mg  
    
   
   
   
  
 fluticasone-vilanterol 100-25 mcg/dose inhaler Commonly known as:  BREO ELLIPTA Your last dose was: Your next dose is: Take 1 Puff by inhalation daily. 1 Puff  
    
   
   
   
  
 furosemide 40 mg tablet Commonly known as:  LASIX Your last dose was: Your next dose is: One tab po daily as needed for fluid  
     
   
   
   
  
 hydroCHLOROthiazide 25 mg tablet Commonly known as:  HYDRODIURIL Your last dose was: Your next dose is: TAKE ONE TABLET BY MOUTH ONCE DAILY losartan 25 mg tablet Commonly known as:  COZAAR Your last dose was: Your next dose is: Take 1 Tab by mouth daily. For blood pressure and kidneys 25 mg  
    
   
   
   
  
 MULTIPLE VITAMINS PO Your last dose was: Your next dose is: Take 1 Tab by mouth daily. 1 Tab  
    
   
   
   
  
 pravastatin 20 mg tablet Commonly known as:  PRAVACHOL Your last dose was: Your next dose is: TAKE ONE TABLET BY MOUTH ONCE DAILY AT  NIGHT  FOR  CHOLESTEROL  
     
   
   
   
  
 PRESERVISION AREDS 2 PO Your last dose was: Your next dose is: Take  by mouth daily. promethazine-codeine 6.25-10 mg/5 mL syrup Commonly known as:  PHENERGAN with CODEINE Your last dose was: Your next dose is: Take 5 mL by mouth four (4) times daily as needed for Cough. Max Daily Amount: 20 mL. 1 tsp  
    
   
   
   
  
 terazosin 10 mg capsule Commonly known as:  HYTRIN Your last dose was: Your next dose is: TAKE ONE CAPSULE BY MOUTH ONCE DAILY FOR BLOOD PRESSURE AND PROSTATE  
     
   
   
   
  
 triazolam 0.25 mg tablet Commonly known as:  Isela Jewels Your last dose was: Your next dose is: Take 1 Tab by mouth nightly as needed. Max Daily Amount: 0.25 mg.  
 0.25 mg  
    
   
   
   
  
 VITAMIN D3 1,000 unit Cap Generic drug:  cholecalciferol Your last dose was: Your next dose is: Take 1 Tab by mouth daily. 1 Tab Opioid Education Prescription Opioids: What You Need to Know: 
 
Prescription opioids can be used to help relieve moderate-to-severe pain and are often prescribed following a surgery or injury, or for certain health conditions. These medications can be an important part of treatment but also come with serious risks. Opioids are strong pain medicines. Examples include hydrocodone, oxycodone, fentanyl, and morphine. Heroin is an example of an illegal opioid. It is important to work with your health care provider to make sure you are getting the safest, most effective care. WHAT ARE THE RISKS AND SIDE EFFECTS OF OPIOID USE? Prescription opioids carry serious risks of addiction and overdose, especially with prolonged use. An opioid overdose, often marked by slow breathing, can cause sudden death. The use of prescription opioids can have a number of side effects as well, even when taken as directed.  
  
· Tolerance-meaning you might need to take more of a medication for the same pain relief · Physical dependence-meaning you have symptoms of withdrawal when the medication is stopped. Withdrawal symptoms can include nausea, sweating, chills, diarrhea, stomach cramps, and muscle aches. Withdrawal can last up to several weeks, depending on which drug you took and how long you took it. · Increased sensitivity to pain · Constipation · Nausea, vomiting, and dry mouth · Sleepiness and dizziness · Confusion · Depression · Low levels of testosterone that can result in lower sex drive, energy, and strength · Itching and sweating RISKS ARE GREATER WITH:      
· History of drug misuse, substance use disorder, or overdose · Mental health conditions (such as depression or anxiety) · Sleep apnea · Older age (72 years or older) · Pregnancy Avoid alcohol while taking prescription opioids. Also, unless specifically advised by your health care provider, medications to avoid include: · Benzodiazepines (such as Xanax or Valium) · Muscle relaxants (such as Soma or Flexeril) · Hypnotics (such as Ambien or Lunesta) · Other prescription opioids KNOW YOUR OPTIONS Talk to your health care provider about ways to manage your pain that don't involve prescription opioids. Some of these options may actually work better and have fewer risks and side effects. Options may include: 
· Pain relievers such as acetaminophen, ibuprofen, and naproxen · Some medications that are also used for depression or seizures · Physical therapy and exercise · Counseling to help patients learn how to cope better with triggers of pain and stress. · Application of heat or cold compress · Massage therapy · Relaxation techniques Be Informed Make sure you know the name of your medication, how much and how often to take it, and its potential risks & side effects. IF YOU ARE PRESCRIBED OPIOIDS FOR PAIN: 
· Never take opioids in greater amounts or more often than prescribed. Remember the goal is not to be pain-free but to manage your pain at a tolerable level. · Follow up with your primary care provider to: · Work together to create a plan on how to manage your pain. · Talk about ways to help manage your pain that don't involve prescription opioids. · Talk about any and all concerns and side effects. · Help prevent misuse and abuse. · Never sell or share prescription opioids · Help prevent misuse and abuse. · Store prescription opioids in a secure place and out of reach of others (this may include visitors, children, friends, and family). · Safely dispose of unused/unwanted prescription opioids: Find your community drug take-back program or your pharmacy mail-back program, or flush them down the toilet, following guidance from the Food and Drug Administration (www.fda.gov/Drugs/ResourcesForYou). · Visit www.cdc.gov/drugoverdose to learn about the risks of opioid abuse and overdose. · If you believe you may be struggling with addiction, tell your health care provider and ask for guidance or call [address] [place] [address] at [phone]. Discharge Instructions [facility] Radiology Department [phone] Radiologist: Dr. Bert Larson Date: 7/20/2018 Thoracentesis Discharge Instructions You may have an aching pain in the puncture site tonight as the numbing medicine wears off. You may take Tylenol, as directed on the label, for pain or discomfort. Avoid ibuprofen (Advil, Motrin) and aspirin products for the next 48 hours as these drugs may increase your chance of bleeding. You have develop a mild cough as the lungs re expand with air, this should resolve with in the next 24 hours. Resume your previous diet and continue your prescribed medicaitons. Rest for the next 24 hours. If you experience shortness of breath (other than your normal breathing pattern) difficulty breathing, or have severe chest pain, call 911 and go to the nearest Emergency Room. Be sure to follow up with your referring physician as soon as possible ACO Transitions of Care Introducing [facility] Lanier offers a voluntary care coordination program to provide high quality service and care to [facility] fee-for-service beneficiaries. Osiel Gomez was designed to help you enhance your health and well-being through the following services: ? Transitions of Care  support for individuals who are transitioning from one care setting to another (example: Hospital to home). ? Chronic and Complex Care Coordination  support for individuals and caregivers of those with serious or chronic illnesses or with more than one chronic (ongoing) condition and those who take a number of different medications. If you meet specific medical criteria, a [facility] may call you directly to coordinate your care with your primary care physician and your other care providers. For questions about the [facility] programs, please, contact your physicians office. For general questions or additional information about Accountable Care Organizations: 
Please visit www.medicare.gov/acos. html or call 1-800-MEDICARE (2-957.770.3783) TTY users should call 5-105.847.6952. Introducing [facility]! [facility] introduces [employer] patient portal. Now you can access parts of your medical record, email your doctor's office, and request medication refills online. 1. In your internet browser, go to [email or web address]t 2. Click on the First Time User? Click Here link in the Sign In box. You will see the New Member Sign Up page. 3. Enter your [employer] Access Code exactly as it appears below.  You will not need to use this code after youve completed the sign-up process. If you do not sign up before the expiration date, you must request a new code. · [employer] Access Code: [email or web address] Expires: 10/7/2018  5:46 PM 
 
4. Enter the last four digits of your Social Security Number (xxxx) and Date of Birth (mm/dd/yyyy) as indicated and click Submit. You will be taken to the next sign-up page. 5. Create a [employer] ID. This will be your [employer] login ID and cannot be changed, so think of one that is secure and easy to remember. 6. Create a [employer] password. You can change your password at any time. 7. Enter your Password Reset Question and Answer. This can be used at a later time if you forget your password. 8. Enter your e-mail address. You will receive e-mail notification when new information is available in [address] 9. Click Sign Up. You can now view and download portions of your medical record. 10. Click the Download Summary menu link to download a portable copy of your medical information. If you have questions, please visit the Frequently Asked Questions section of the [employer] website. Remember, [employer] is NOT to be used for urgent needs. For medical emergencies, dial 911. Now available from your iPhone and Android! Introducing Yinka Chaparro As a [facility] patient, I wanted to make you aware of our electronic visit tool called Yinka Chaparro. [facility] 24/7 allows you to connect within minutes with a medical provider 24 hours a day, seven days a week via a mobile device or tablet or logging into a secure website from your computer. You can access Yinka Chaparro from anywhere in the [place].  
 
A virtual visit might be right for you when you have a simple condition and feel like you just dont want to get out of bed, or cant get away from work for an appointment, when your regular [facility] provider is not available (evenings, weekends or holidays), or when youre out of town and need minor care. Electronic visits cost only $49 and if the [facility] 24/[employer] provider determines a prescription is needed to treat your condition, one can be electronically transmitted to a nearby pharmacy*. Please take a moment to enroll today if you have not already done so. The enrollment process is free and takes just a few minutes. To enroll, please download the [employer] rod to your tablet or phone, or visit [email or web address] to enroll on your computer. And, as an [address] [place] [address] patient with a [facility] account, the results of your visits will be scanned into your electronic medical record and your primary care provider will be able to view the scanned results. We urge you to continue to see your regular [facility] provider for your ongoing medical care. And while your primary care provider may not be the one available when you seek a Patient Communicator virtual visit, the peace of mind you get from getting a real diagnosis real time can be priceless. For more information on Patient Communicator, view our Frequently Asked Questions (FAQs) at [email or web address]. Sincerely, 
 
Rehan Borrego MD 
Chief Medical Officer 8 Kirstin Lanier *:  certain medications cannot be prescribed via Patient Communicator Unresulted Labs-Please follow up with your PCP about these lab tests Order Current Status [address] In process Providers Seen During Your Hospitalization Provider Specialty Primary office phone Geoff Petersen MD Hematology and Oncology [phone] Your Primary Care Physician (PCP) Primary Care Physician Office Phone Office Fax Kenzie Landers [phone] You are allergic to the following Allergen Reactions Ciprofloxacin Other (comments) Nauseated, felt fuzzy Erythromycin Unknown (comments) Pcn (Penicillins) Swelling Recent Documentation Height Weight BMI Smoking Status 1.676 m 71.2 kg 25.34 kg/m2 Former Smoker Emergency Contacts Name Discharge Info Relation Home Work Mobile [address] CAREGIVER [3] Spouse [3] [phone] Patient Belongings The following personal items are in your possession at time of discharge: 
     Visual Aid: None Please provide this summary of care documentation to your next provider. Signatures-by signing, you are acknowledging that this After Visit Summary has been reviewed with you and you have received a copy. Patient Signature:  ____________________________________________________________ Date:  ____________________________________________________________  
  
Emy Sleight Provider Signature:  ____________________________________________________________ Date:  ____________________________________________________________

## 2018-07-20 NOTE — DISCHARGE INSTRUCTIONS
[facility]  Radiology Department  [phone]    Radiologist: Dr. Robbin Montague    Date: 7/20/2018      Thoracentesis Discharge Instructions    You may have an aching pain in the puncture site tonight as the numbing medicine wears off. You may take Tylenol, as directed on the label, for pain or discomfort. Avoid ibuprofen (Advil, Motrin) and aspirin products for the next 48 hours as these drugs may increase your chance of bleeding. You have develop a mild cough as the lungs re expand with air, this should resolve with in the next 24 hours. Resume your previous diet and continue your prescribed medicaitons. Rest for the next 24 hours. If you experience shortness of breath (other than your normal breathing pattern) difficulty breathing, or have severe chest pain, call 911 and go to the nearest Emergency Room.     Be sure to follow up with your referring physician as soon as possible

## 2018-07-20 NOTE — PROGRESS NOTES
Name of procedure: Thoracentesis 1350 ml fluid removed    Complications, if any, r/t procedure: none    VS : Stable    Pt tolerated procedure well. VSS. No C/O pain. Dressing to site D&I. No bleeding or hematoma noted to site. HOB elevated. PCXR ordered. Will monitor.

## 2018-07-24 ENCOUNTER — OFFICE VISIT (OUTPATIENT)
Dept: FAMILY MEDICINE CLINIC | Age: 83
End: 2018-07-24

## 2018-07-24 VITALS
RESPIRATION RATE: 14 BRPM | TEMPERATURE: 98.8 F | WEIGHT: 153 LBS | DIASTOLIC BLOOD PRESSURE: 64 MMHG | SYSTOLIC BLOOD PRESSURE: 142 MMHG | HEIGHT: 66 IN | BODY MASS INDEX: 24.59 KG/M2 | HEART RATE: 68 BPM | OXYGEN SATURATION: 97 %

## 2018-07-24 DIAGNOSIS — C34.00 MALIGNANT NEOPLASM OF HILUS OF LUNG, UNSPECIFIED LATERALITY (HCC): ICD-10-CM

## 2018-07-24 DIAGNOSIS — F41.9 ANXIETY: ICD-10-CM

## 2018-07-24 DIAGNOSIS — F51.01 PRIMARY INSOMNIA: Primary | ICD-10-CM

## 2018-07-24 RX ORDER — PREDNISONE 10 MG/1
10 TABLET ORAL 2 TIMES DAILY
Qty: 20 TAB | Refills: 0 | Status: SHIPPED | OUTPATIENT
Start: 2018-07-24 | End: 2018-08-20 | Stop reason: ALTCHOICE

## 2018-07-24 RX ORDER — ALPRAZOLAM 0.5 MG/1
TABLET ORAL
Qty: 60 TAB | Refills: 3 | Status: SHIPPED | OUTPATIENT
Start: 2018-07-24 | End: 2018-12-18

## 2018-07-24 RX ORDER — TRIAZOLAM 0.25 MG/1
0.25 TABLET ORAL
Qty: 90 TAB | Refills: 1 | Status: SHIPPED | OUTPATIENT
Start: 2018-07-24 | End: 2018-12-10 | Stop reason: SDUPTHER

## 2018-07-24 RX ORDER — ALLOPURINOL 300 MG/1
TABLET ORAL
Qty: 90 TAB | Refills: 12 | Status: SHIPPED | OUTPATIENT
Start: 2018-07-24

## 2018-07-24 NOTE — MR AVS SNAPSHOT
[address] [facility] [address] 
 
 
 [address] [facility] [address] [id number] [phone] Patient: Vin Cowan MRN: [id number]:[id number] Visit Information Date & Time Provider Department Dept. Phone Encounter #  
 7/24/2018  2:45 PM Suresh Ryan MD [address] [facility] [phone] [id number] Follow-up Instructions Return in about 2 months (around 9/24/2018). Your Appointments 12/4/2018  9:00 AM  
ROUTINE CARE with Suresh Ryan MD  
[address] [facility]) Appt Note: 6m follow up  
 [address] [facility] [address] [id number] [phone] [address] [facility] [address] Upcoming Health Maintenance Date Due  
 EYE EXAM RETINAL OR DILATED Q1 6/8/2017 Pneumococcal 65+ High/Highest Risk (2 of 2 - PPSV23) 8/28/2017 HEMOGLOBIN A1C Q6M 4/18/2018 GLAUCOMA SCREENING Q2Y 6/8/2018 Influenza Age 5 to Adult 8/1/2018 FOOT EXAM Q1 10/18/2018 MICROALBUMIN Q1 10/18/2018 LIPID PANEL Q1 10/18/2018 MEDICARE YEARLY EXAM 10/19/2018 DTaP/Tdap/Td series (2 - Td) 5/16/2026 Allergies as of 7/24/2018  Review Complete On: 7/24/2018 By: Suresh Ryan MD  
  
 Severity Noted Reaction Type Reactions Ciprofloxacin  04/18/2014   Side Effect Other (comments) Nauseated, felt fuzzy Erythromycin  04/30/2010    Unknown (comments) Pcn [Penicillins]  04/30/2010    Swelling Current Immunizations  Reviewed on 8/2/2016 Name Date Influenza High Dose Vaccine PF 10/18/2017, 9/12/2016, 11/30/2015, 10/13/2014 Influenza Vaccine 11/18/2013 Influenza Vaccine Split 10/31/2012, 10/17/2011, 10/29/2010 TD Vaccine 8/28/2012 ZZZ-RETIRED (DO NOT USE) Pneumococcal Vaccine (Unspecified Type) 8/28/2012 Not reviewed this visit You Were Diagnosed With   
  
 Codes Comments Anxiety    -  Primary ICD-10-CM: F41.9 ICD-9-CM: 300.00 Primary insomnia     ICD-10-CM: F51.01 
ICD-9-CM: 307.42 Vitals BP Pulse Temp Resp Height(growth percentile) Weight(growth percentile) 142/64 68 98.8 °F (37.1 °C) 14 5' 6\" (1.676 m) 153 lb (69.4 kg) SpO2 BMI Smoking Status 97% 24.69 kg/m2 Former Smoker BMI and BSA Data Body Mass Index Body Surface Area  
 24.69 kg/m 2 1.8 m 2 Preferred Pharmacy Pharmacy Name Phone [facility] [address] [place] [address] Dr Pres[place], [address] [phone] Your Updated Medication List  
  
   
This list is accurate as of 7/24/18  4:24 PM.  Always use your most recent med list.  
  
  
  
  
 albuterol 90 mcg/actuation inhaler Commonly known as:  PROVENTIL HFA, VENTOLIN HFA, PROAIR HFA Take 2 Puffs by inhalation every four (4) hours as needed for Shortness of Breath. allopurinol 300 mg tablet Commonly known as:  ZYLOPRIM  
TAKE ONE TABLET BY MOUTH ONCE DAILY TO PREVENT GOUT  
  
 ALPRAZolam 0.5 mg tablet Commonly known as:  XANAX  
TAKE ONE TABLET BY MOUTH TWICE DAILY AS NEEDED FOR ANXIETY  
  
 aspirin 325 mg tablet Commonly known as:  ASPIRIN Take 325 mg by mouth daily. atenolol 50 mg tablet Commonly known as:  TENORMIN  
TAKE 1/2  TABLET BY MOUTH TWICE DAILY FOR BLOOD PRESSURE  
  
 cetirizine 10 mg tablet Commonly known as:  ZYRTEC Take 1 Tab by mouth daily. diphenoxylate-atropine 2.5-0.025 mg per tablet Commonly known as:  LOMOTIL Take 1 Tab by mouth four (4) times daily as needed for Diarrhea. Max Daily Amount: 4 Tabs. finasteride 5 mg tablet Commonly known as:  PROSCAR Take 1 Tab by mouth daily. For prostate  
  
 fluticasone-vilanterol 100-25 mcg/dose inhaler Commonly known as:  BREO ELLIPTA Take 1 Puff by inhalation daily. furosemide 40 mg tablet Commonly known as:  LASIX One tab po daily as needed for fluid  
  
 hydroCHLOROthiazide 25 mg tablet Commonly known as:  HYDRODIURIL  
 TAKE ONE TABLET BY MOUTH ONCE DAILY losartan 25 mg tablet Commonly known as:  COZAAR Take 1 Tab by mouth daily. For blood pressure and kidneys MULTIPLE VITAMINS PO Take 1 Tab by mouth daily. pravastatin 20 mg tablet Commonly known as:  PRAVACHOL  
TAKE ONE TABLET BY MOUTH ONCE DAILY AT  NIGHT  FOR  CHOLESTEROL  
  
 predniSONE 10 mg tablet Commonly known as:  Trinity Gelineau Take 1 Tab by mouth two (2) times a day. For knee pain PRESERVISION AREDS 2 PO Take  by mouth daily. promethazine-codeine 6.25-10 mg/5 mL syrup Commonly known as:  PHENERGAN with CODEINE Take 5 mL by mouth four (4) times daily as needed for Cough. Max Daily Amount: 20 mL. terazosin 10 mg capsule Commonly known as:  HYTRIN  
TAKE ONE CAPSULE BY MOUTH ONCE DAILY FOR BLOOD PRESSURE AND PROSTATE  
  
 triazolam 0.25 mg tablet Commonly known as:  Bosie Pintos Take 1 Tab by mouth nightly as needed. Max Daily Amount: 0.25 mg. VITAMIN D3 1,000 unit Cap Generic drug:  cholecalciferol Take 1 Tab by mouth daily. Prescriptions Printed Refills  
 predniSONE (DELTASONE) 10 mg tablet 0 Sig: Take 1 Tab by mouth two (2) times a day. For knee pain  
 Class: Print Route: Oral  
 triazolam (HALCION) 0.25 mg tablet 1 Sig: Take 1 Tab by mouth nightly as needed. Max Daily Amount: 0.25 mg.  
 Class: Print Route: Oral  
 ALPRAZolam (XANAX) 0.5 mg tablet 3 Sig: TAKE ONE TABLET BY MOUTH TWICE DAILY AS NEEDED FOR ANXIETY Class: Print  
 allopurinol (ZYLOPRIM) 300 mg tablet 12 Sig: TAKE ONE TABLET BY MOUTH ONCE DAILY TO PREVENT GOUT Class: Print Follow-up Instructions Return in about 2 months (around 9/24/2018). Introducing [facility]! Sonjaanil Bryan introduces [employer] patient portal. Now you can access parts of your medical record, email your doctor's office, and request medication refills online.    
 
1. In your internet browser, go to [email or web address] 2. Click on the First Time User? Click Here link in the Sign In box. You will see the New Member Sign Up page. 3. Enter your [employer] Access Code exactly as it appears below. You will not need to use this code after youve completed the sign-up process. If you do not sign up before the expiration date, you must request a new code. · [employer] Access Code: [email or web address] Expires: 10/7/2018  5:46 PM 
 
4. Enter the last four digits of your Social Security Number (xxxx) and Date of Birth (mm/dd/yyyy) as indicated and click Submit. You will be taken to the next sign-up page. 5. Create a [employer] ID. This will be your [employer] login ID and cannot be changed, so think of one that is secure and easy to remember. 6. Create a [employer] password. You can change your password at any time. 7. Enter your Password Reset Question and Answer. This can be used at a later time if you forget your password. 8. Enter your e-mail address. You will receive e-mail notification when new information is available in [address] 9. Click Sign Up. You can now view and download portions of your medical record. 10. Click the Download Summary menu link to download a portable copy of your medical information. If you have questions, please visit the Frequently Asked Questions section of the [employer] website. Remember, [employer] is NOT to be used for urgent needs. For medical emergencies, dial 911. Now available from your iPhone and Android! Please provide this summary of care documentation to your next provider. Your primary care clinician is listed as Kimberli Romero. If you have any questions after today's visit, please call [phone].

## 2018-07-24 NOTE — PROGRESS NOTES
HISTORY OF PRESENT ILLNESS  Milton Washburn is a 80 y.o. male. HPI In for followup. Had a cortisone shot in L knee- didn't help that much. Interested in another shot. Going on vacation this Saturday to [facility], 3 children and their families will be there. Breathing has been doing reasonably well. Minimal cough. Occasionally coughs up some blood. No chest pains. Gets some discomfort in lower chest if is out walking. Has seen Dr. Fatoumata Grant. They discussed chemo and RT. Plans on seeing her again after his vacation. ROS    Physical Exam   Constitutional: He appears well-developed and well-nourished. HENT:   Right Ear: External ear normal.   Left Ear: External ear normal.   Mouth/Throat: Oropharynx is clear and moist.   Neck: No thyromegaly present. Cardiovascular: Normal rate, regular rhythm, normal heart sounds and intact distal pulses. Pulmonary/Chest: Effort normal and breath sounds normal. No respiratory distress. He has no wheezes. Abdominal: Soft. Bowel sounds are normal. He exhibits no distension and no mass. There is no tenderness. There is no guarding. Musculoskeletal: Normal range of motion. He exhibits no edema. Lymphadenopathy:     He has no cervical adenopathy. Nursing note and vitals reviewed. ASSESSMENT and PLAN  Orders Placed This Encounter    predniSONE (DELTASONE) 10 mg tablet     Sig: Take 1 Tab by mouth two (2) times a day. For knee pain     Dispense:  20 Tab     Refill:  0    triazolam (HALCION) 0.25 mg tablet     Sig: Take 1 Tab by mouth nightly as needed. Max Daily Amount: 0.25 mg.      Dispense:  90 Tab     Refill:  1    ALPRAZolam (XANAX) 0.5 mg tablet     Sig: TAKE ONE TABLET BY MOUTH TWICE DAILY AS NEEDED FOR ANXIETY     Dispense:  60 Tab     Refill:  3    allopurinol (ZYLOPRIM) 300 mg tablet     Sig: TAKE ONE TABLET BY MOUTH ONCE DAILY TO PREVENT GOUT     Dispense:  90 Tab     Refill:  12     Please consider 90 day supplies to promote better adherence Orders Placed This Encounter    predniSONE (DELTASONE) 10 mg tablet    triazolam (HALCION) 0.25 mg tablet    ALPRAZolam (XANAX) 0.5 mg tablet    allopurinol (ZYLOPRIM) 300 mg tablet     Diagnoses and all orders for this visit:    1. Primary insomnia  -     triazolam (HALCION) 0.25 mg tablet; Take 1 Tab by mouth nightly as needed. Max Daily Amount: 0.25 mg.    2. Anxiety  -     ALPRAZolam (XANAX) 0.5 mg tablet; TAKE ONE TABLET BY MOUTH TWICE DAILY AS NEEDED FOR ANXIETY    3. Malignant neoplasm of hilus of lung, unspecified laterality ([facility] [address]    Other orders  -     predniSONE (DELTASONE) 10 mg tablet; Take 1 Tab by mouth two (2) times a day.  For knee pain  -     allopurinol (ZYLOPRIM) 300 mg tablet; TAKE ONE TABLET BY MOUTH ONCE DAILY TO PREVENT GOUT      Pt weighing options re chemotherapy

## 2018-07-24 NOTE — PROGRESS NOTES
Chief Complaint   Patient presents with    Abnormal Lab Results     1. Have you been to the ER, urgent care clinic since your last visit? Hospitalized since your last visit? No    2. Have you seen or consulted any other health care providers outside of the [address] [place] [address] since your last visit? Include any pap smears or colon screening.  No     Health Maintenance Due   Topic Date Due    EYE EXAM RETINAL OR DILATED Q1  06/08/2017    Pneumococcal 65+ High/Highest Risk (2 of 2 - PPSV23) 08/28/2017    HEMOGLOBIN A1C Q6M  04/18/2018    GLAUCOMA SCREENING Q2Y  06/08/2018

## 2018-08-07 ENCOUNTER — TELEPHONE (OUTPATIENT)
Dept: FAMILY MEDICINE CLINIC | Age: 83
End: 2018-08-07

## 2018-08-07 DIAGNOSIS — J90 PLEURAL EFFUSION: Primary | ICD-10-CM

## 2018-08-07 DIAGNOSIS — C34.91 MALIGNANT NEOPLASM OF RIGHT LUNG, UNSPECIFIED PART OF LUNG (HCC): ICD-10-CM

## 2018-08-07 NOTE — TELEPHONE ENCOUNTER
Gave information to patient    US at [address] [place] [address]  1 tomorrow at 1:00 pm  Test scheduled at 1:30pm  Bring list of medications  Bring a

## 2018-08-07 NOTE — TELEPHONE ENCOUNTER
Patient stated he needs  to make him an appointment at the hospital to have the fluid drained off his lungs. Patient said he can feel the fluid building up and he is having some SOB. Patient would like a return call at [phone].

## 2018-08-08 ENCOUNTER — HOSPITAL ENCOUNTER (OUTPATIENT)
Dept: GENERAL RADIOLOGY | Age: 83
Discharge: HOME | End: 2018-08-08
Attending: STUDENT IN AN ORGANIZED HEALTH CARE EDUCATION/TRAINING PROGRAM
Payer: MEDICARE

## 2018-08-08 ENCOUNTER — HOSPITAL ENCOUNTER (OUTPATIENT)
Dept: ULTRASOUND IMAGING | Age: 83
Discharge: HOME | End: 2018-08-08
Attending: FAMILY MEDICINE
Payer: MEDICARE

## 2018-08-08 VITALS
DIASTOLIC BLOOD PRESSURE: 64 MMHG | RESPIRATION RATE: 16 BRPM | HEIGHT: 67 IN | HEART RATE: 75 BPM | OXYGEN SATURATION: 97 % | WEIGHT: 154 LBS | SYSTOLIC BLOOD PRESSURE: 117 MMHG | TEMPERATURE: 97.7 F | BODY MASS INDEX: 24.17 KG/M2

## 2018-08-08 DIAGNOSIS — C34.91 MALIGNANT NEOPLASM OF RIGHT LUNG, UNSPECIFIED PART OF LUNG (HCC): ICD-10-CM

## 2018-08-08 DIAGNOSIS — J90 PLEURAL EFFUSION: ICD-10-CM

## 2018-08-08 PROCEDURE — 32555 ASPIRATE PLEURA W/ IMAGING: CPT

## 2018-08-08 PROCEDURE — 74011250636 HC RX REV CODE- 250/636: Performed by: STUDENT IN AN ORGANIZED HEALTH CARE EDUCATION/TRAINING PROGRAM

## 2018-08-08 PROCEDURE — 71045 X-RAY EXAM CHEST 1 VIEW: CPT

## 2018-08-08 RX ORDER — LIDOCAINE HYDROCHLORIDE 10 MG/ML
5 INJECTION, SOLUTION EPIDURAL; INFILTRATION; INTRACAUDAL; PERINEURAL
Status: COMPLETED | OUTPATIENT
Start: 2018-08-08 | End: 2018-08-08

## 2018-08-08 RX ADMIN — LIDOCAINE HYDROCHLORIDE 5 ML: 10 INJECTION, SOLUTION EPIDURAL; INFILTRATION; INTRACAUDAL; PERINEURAL at 16:00

## 2018-08-08 NOTE — ROUTINE PROCESS
1325  Patient arrived ambulatory to Radiology Recovery, alert and oriented x 4.    1515  Patient alert and oriented x 4, denies pain at procedure site, reports breathing easier. Dr. Aaron Oliva evaluated post Thoracentesis chest xray and advised patient can be discharged. Patient provided with verbal and written discharge instructions, patient verbalized understanding. Patient discharged via wheelchair with family to transport home.

## 2018-08-08 NOTE — IP AVS SNAPSHOT
[address] [facility] 
[phone] Patient: Shyla Oliveros MRN: [id number] XBO:[id number] About your hospitalization You were admitted on:  August 8, 2018 You last received care in the:  [facility] Ultrasound You were discharged on:  August 8, 2018 Why you were hospitalized Your primary diagnosis was:  Not on File Follow-up Information None Your Scheduled Appointments Wednesday August 08, 2018  3:00 PM EDT  
XR PLAIN FILM with [facility] PORTABLE 2  
[facility] RADIOLOGY ([address]) [address]  
[phone] A valid order with Physicians signature is required for all scheduled tests. Patient needs to register prior to exam.  
  
    
Patient should report to outpatient registration ([facility]) 30 minutes prior to the appointment time unless instructed otherwise. Discharge Orders None A check jc indicates which time of day the medication should be taken. My Medications ASK your doctor about these medications Instructions Each Dose to Equal  
 Morning Noon Evening Bedtime  
 albuterol 90 mcg/actuation inhaler Commonly known as:  PROVENTIL HFA, VENTOLIN HFA, PROAIR HFA Your last dose was: Your next dose is: Take 2 Puffs by inhalation every four (4) hours as needed for Shortness of Breath. 2 Puff  
    
   
   
   
  
 allopurinol 300 mg tablet Commonly known as:  Abdiaziz Cooper Your last dose was: Your next dose is: TAKE ONE TABLET BY MOUTH ONCE DAILY TO PREVENT GOUT  
     
   
   
   
  
 ALPRAZolam 0.5 mg tablet Commonly known as:  Martha To Your last dose was: Your next dose is: TAKE ONE TABLET BY MOUTH TWICE DAILY AS NEEDED FOR ANXIETY  
     
   
   
   
  
 aspirin 325 mg tablet Commonly known as:  ASPIRIN Your last dose was: Your next dose is: Take 325 mg by mouth daily. 325 mg  
    
   
   
   
  
 atenolol 50 mg tablet Commonly known as:  TENORMIN Your last dose was: Your next dose is: TAKE 1/2  TABLET BY MOUTH TWICE DAILY FOR BLOOD PRESSURE  
     
   
   
   
  
 cetirizine 10 mg tablet Commonly known as:  ZYRTEC Your last dose was: Your next dose is: Take 1 Tab by mouth daily. 10 mg  
    
   
   
   
  
 diphenoxylate-atropine 2.5-0.025 mg per tablet Commonly known as:  LOMOTIL Your last dose was: Your next dose is: Take 1 Tab by mouth four (4) times daily as needed for Diarrhea. Max Daily Amount: 4 Tabs. 1 Tab  
    
   
   
   
  
 finasteride 5 mg tablet Commonly known as:  PROSCAR Your last dose was: Your next dose is: Take 1 Tab by mouth daily. For prostate 5 mg  
    
   
   
   
  
 fluticasone-vilanterol 100-25 mcg/dose inhaler Commonly known as:  BREO ELLIPTA Your last dose was: Your next dose is: Take 1 Puff by inhalation daily. 1 Puff  
    
   
   
   
  
 furosemide 40 mg tablet Commonly known as:  LASIX Your last dose was: Your next dose is: One tab po daily as needed for fluid  
     
   
   
   
  
 hydroCHLOROthiazide 25 mg tablet Commonly known as:  HYDRODIURIL Your last dose was: Your next dose is: TAKE ONE TABLET BY MOUTH ONCE DAILY losartan 25 mg tablet Commonly known as:  COZAAR Your last dose was: Your next dose is: Take 1 Tab by mouth daily. For blood pressure and kidneys 25 mg  
    
   
   
   
  
 MULTIPLE VITAMINS PO Your last dose was: Your next dose is: Take 1 Tab by mouth daily. 1 Tab pravastatin 20 mg tablet Commonly known as:  PRAVACHOL Your last dose was: Your next dose is: TAKE ONE TABLET BY MOUTH ONCE DAILY AT  NIGHT  FOR  CHOLESTEROL  
     
   
   
   
  
 predniSONE 10 mg tablet Commonly known as:  Merle Andrew Your last dose was: Your next dose is: Take 1 Tab by mouth two (2) times a day. For knee pain 10 mg PRESERVISION AREDS 2 PO Your last dose was: Your next dose is: Take  by mouth daily. promethazine-codeine 6.25-10 mg/5 mL syrup Commonly known as:  PHENERGAN with CODEINE Your last dose was: Your next dose is: Take 5 mL by mouth four (4) times daily as needed for Cough. Max Daily Amount: 20 mL. 1 tsp  
    
   
   
   
  
 terazosin 10 mg capsule Commonly known as:  HYTRIN Your last dose was: Your next dose is: TAKE ONE CAPSULE BY MOUTH ONCE DAILY FOR BLOOD PRESSURE AND PROSTATE  
     
   
   
   
  
 triazolam 0.25 mg tablet Commonly known as:  Jordy Herd Your last dose was: Your next dose is: Take 1 Tab by mouth nightly as needed. Max Daily Amount: 0.25 mg.  
 0.25 mg  
    
   
   
   
  
 VITAMIN D3 1,000 unit Cap Generic drug:  cholecalciferol Your last dose was: Your next dose is: Take 1 Tab by mouth daily. 1 Tab Opioid Education Prescription Opioids: What You Need to Know: 
 
Prescription opioids can be used to help relieve moderate-to-severe pain and are often prescribed following a surgery or injury, or for certain health conditions. These medications can be an important part of treatment but also come with serious risks. Opioids are strong pain medicines. Examples include hydrocodone, oxycodone, fentanyl, and morphine. Heroin is an example of an illegal opioid.   It is important to work with your health care provider to make sure you are getting the safest, most effective care. WHAT ARE THE RISKS AND SIDE EFFECTS OF OPIOID USE? Prescription opioids carry serious risks of addiction and overdose, especially with prolonged use. An opioid overdose, often marked by slow breathing, can cause sudden death. The use of prescription opioids can have a number of side effects as well, even when taken as directed. · Tolerance-meaning you might need to take more of a medication for the same pain relief · Physical dependence-meaning you have symptoms of withdrawal when the medication is stopped. Withdrawal symptoms can include nausea, sweating, chills, diarrhea, stomach cramps, and muscle aches. Withdrawal can last up to several weeks, depending on which drug you took and how long you took it. · Increased sensitivity to pain · Constipation · Nausea, vomiting, and dry mouth · Sleepiness and dizziness · Confusion · Depression · Low levels of testosterone that can result in lower sex drive, energy, and strength · Itching and sweating RISKS ARE GREATER WITH:      
· History of drug misuse, substance use disorder, or overdose · Mental health conditions (such as depression or anxiety) · Sleep apnea · Older age (72 years or older) · Pregnancy Avoid alcohol while taking prescription opioids. Also, unless specifically advised by your health care provider, medications to avoid include: · Benzodiazepines (such as Xanax or Valium) · Muscle relaxants (such as Soma or Flexeril) · Hypnotics (such as Ambien or Lunesta) · Other prescription opioids KNOW YOUR OPTIONS Talk to your health care provider about ways to manage your pain that don't involve prescription opioids. Some of these options may actually work better and have fewer risks and side effects. Options may include: 
· Pain relievers such as acetaminophen, ibuprofen, and naproxen · Some medications that are also used for depression or seizures · Physical therapy and exercise · Counseling to help patients learn how to cope better with triggers of pain and stress. · Application of heat or cold compress · Massage therapy · Relaxation techniques Be Informed Make sure you know the name of your medication, how much and how often to take it, and its potential risks & side effects. IF YOU ARE PRESCRIBED OPIOIDS FOR PAIN: 
· Never take opioids in greater amounts or more often than prescribed. Remember the goal is not to be pain-free but to manage your pain at a tolerable level. · Follow up with your primary care provider to: · Work together to create a plan on how to manage your pain. · Talk about ways to help manage your pain that don't involve prescription opioids. · Talk about any and all concerns and side effects. · Help prevent misuse and abuse. · Never sell or share prescription opioids · Help prevent misuse and abuse. · Store prescription opioids in a secure place and out of reach of others (this may include visitors, children, friends, and family). · Safely dispose of unused/unwanted prescription opioids: Find your community drug take-back program or your pharmacy mail-back program, or flush them down the toilet, following guidance from the Food and Drug Administration (www.fda.gov/Drugs/ResourcesForYou). · Visit www.cdc.gov/drugoverdose to learn about the risks of opioid abuse and overdose. · If you believe you may be struggling with addiction, tell your health care provider and ask for guidance or call [address] [place] [address] at [phone]. Discharge Instructions [facility] Radiology Department [phone] Radiologist:  Dr. Linda Jackson Date:  August 8, 2018 Thoracentesis Discharge Instructions You may have an aching pain in the puncture site tonight as the numbing medicine wears off. You may take Tylenol, as directed on the label, for pain or discomfort. Avoid ibuprofen (Advil, Motrin) and aspirin products for the next 48 hours as these drugs may increase your chance of bleeding. You have develop a mild cough as the lungs re expand with air, this should resolve with in the next 24 hours. Resume your previous diet and continue your prescribed medicaitons. Rest for the next 24 hours. If you experience shortness of breath (other than your normal breathing pattern) difficulty breathing, or have severe chest pain, call 911 and go to the nearest Emergency Room. Be sure to follow up with your referring physician as soon as possible ACO Transitions of Care Introducing [place] [address] Kirstin Lanier offers a voluntary care coordination program to provide high quality service and care to [facility] fee-for-service beneficiaries. Lior Garcia was designed to help you enhance your health and well-being through the following services: ? Transitions of Care  support for individuals who are transitioning from one care setting to another (example: Hospital to home). ? Chronic and Complex Care Coordination  support for individuals and caregivers of those with serious or chronic illnesses or with more than one chronic (ongoing) condition and those who take a number of different medications. If you meet specific medical criteria, a [facility] [address] may call you directly to coordinate your care with your primary care physician and your other care providers. For questions about the [facility] programs, please, contact your physicians office. For general questions or additional information about Accountable Care Organizations: 
Please visit www.medicare.gov/acos. html or call 1-800-MEDICARE (0-916.966.9079) TTY users should call 1-812.632.8856. Introducing [facility]! [employer] introduces Mag[employer] [place] patient portal. Now you can access parts of your medical record, email your doctor's office, and request medication refills online. 1. In your internet browser, go to [email or web address] 2. Click on the First Time User? Click Here link in the Sign In box. You will see the New Member Sign Up page. 3. Enter your [employer] Access Code exactly as it appears below. You will not need to use this code after youve completed the sign-up process. If you do not sign up before the expiration date, you must request a new code. · [employer] Access Code: [email or web address] Expires: 10/7/2018  5:46 PM 
 
4. Enter the last four digits of your Social Security Number (xxxx) and Date of Birth (mm/dd/yyyy) as indicated and click Submit. You will be taken to the next sign-up page. 5. Create a [employer] ID. This will be your [employer] login ID and cannot be changed, so think of one that is secure and easy to remember. 6. Create a [employer] password. You can change your password at any time. 7. Enter your Password Reset Question and Answer. This can be used at a later time if you forget your password. 8. Enter your e-mail address. You will receive e-mail notification when new information is available in [address]. 9. Click Sign Up. You can now view and download portions of your medical record. 10. Click the Download Summary menu link to download a portable copy of your medical information. If you have questions, please visit the Frequently Asked Questions section of the [employer] website. Remember, [employer] is NOT to be used for urgent needs. For medical emergencies, dial 911. Now available from your iPhone and Android! Introducing Yinka Chaparro As a [employer] patient, I wanted to make you aware of our electronic visit tool called Yinka Chaparro. [employer] allows you to connect within minutes with a medical provider 24 hours a day, seven days a week via a mobile device or tablet or logging into a secure website from your computer. You can access Elepath from anywhere in the [place]. A virtual visit might be right for you when you have a simple condition and feel like you just dont want to get out of bed, or cant get away from work for an appointment, when your regular C[employer] provider is not available (evenings, weekends or holidays), or when youre out of town and need minor care. Electronic visits cost only $49 and if the [employer]ter[employer] provider determines a prescription is needed to treat your condition, one can be electronically transmitted to a nearby pharmacy*. Please take a moment to enroll today if you have not already done so. The enrollment process is free and takes just a few minutes. To enroll, please download the Pl[employer] Logic rod to your tablet or phone, or visit [email or web address] to enroll on your computer. And, as an [address] [place] [address] patient with a Supramed account, the results of your visits will be scanned into your electronic medical record and your primary care provider will be able to view the scanned results. We urge you to continue to see your regular Cere[facility] provider for your ongoing medical care. And while your primary care provider may not be the one available when you seek a Elepath virtual visit, the peace of mind you get from getting a real diagnosis real time can be priceless. For more information on Elepath, view our Frequently Asked Questions (FAQs) at [email or web address]. Sincerely, 
 
Margaret Green MD 
Chief Medical Officer Jeff Lanier *:  certain medications cannot be prescribed via Elepath Unresulted Labs-Please follow up with your PCP about these lab tests Order Current Status [address] In process Providers Seen During Your Hospitalization Provider Specialty Primary office phone Tesfaye Maynard MD [facility] [phone] Your Primary Care Physician (PCP) Primary Care Physician Office Phone Office Fax Cailin Dietz [phone] You are allergic to the following Allergen Reactions Ciprofloxacin Other (comments) Nauseated, felt fuzzy Erythromycin Unknown (comments) Pcn (Penicillins) Swelling Recent Documentation Height Weight BMI Smoking Status 1.702 m 69.9 kg 24.12 kg/m2 Former Smoker Emergency Contacts Name Discharge Info Relation Home Work Mobile [address] CAREGIVER [3] Spouse [3] [phone] Patient Belongings The following personal items are in your possession at time of discharge: 
     Visual Aid: Glasses, At home   Hearing Aids/Status: Does not own Please provide this summary of care documentation to your next provider. Signatures-by signing, you are acknowledging that this After Visit Summary has been reviewed with you and you have received a copy. Patient Signature:  ____________________________________________________________ Date:  ____________________________________________________________  
  
Kristyn Duran Provider Signature:  ____________________________________________________________ Date:  ____________________________________________________________

## 2018-08-08 NOTE — DISCHARGE INSTRUCTIONS
[facility]  Radiology Department  [phone]    Radiologist:  Dr. Zaida Brown    Date:  August 8, 2018      Thoracentesis Discharge Instructions    You may have an aching pain in the puncture site tonight as the numbing medicine wears off. You may take Tylenol, as directed on the label, for pain or discomfort. Avoid ibuprofen (Advil, Motrin) and aspirin products for the next 48 hours as these drugs may increase your chance of bleeding. You have develop a mild cough as the lungs re expand with air, this should resolve with in the next 24 hours. Resume your previous diet and continue your prescribed medicaitons. Rest for the next 24 hours. If you experience shortness of breath (other than your normal breathing pattern) difficulty breathing, or have severe chest pain, call 911 and go to the nearest Emergency Room.     Be sure to follow up with your referring physician as soon as possible

## 2018-08-20 ENCOUNTER — OFFICE VISIT (OUTPATIENT)
Dept: FAMILY MEDICINE CLINIC | Age: 83
End: 2018-08-20

## 2018-08-20 VITALS
TEMPERATURE: 96.4 F | WEIGHT: 152.8 LBS | RESPIRATION RATE: 14 BRPM | HEIGHT: 67 IN | SYSTOLIC BLOOD PRESSURE: 114 MMHG | HEART RATE: 69 BPM | DIASTOLIC BLOOD PRESSURE: 58 MMHG | BODY MASS INDEX: 23.98 KG/M2 | OXYGEN SATURATION: 93 %

## 2018-08-20 DIAGNOSIS — C34.11 MALIGNANT NEOPLASM OF UPPER LOBE OF RIGHT LUNG (HCC): Primary | ICD-10-CM

## 2018-08-20 NOTE — PROGRESS NOTES
HISTORY OF PRESENT ILLNESS  Efrain Russell is a 80 y.o. male. HPI In for followup. Had thoracentesis on 8-8. Helped his breathing a little. SAw Dr. Fam Hoffman last week, plans on starting oslmertinib (Shanna Parsons) soon. Still has some mild dyspnea. Is also having pain in lower chest. Pain in lower chest is worse if coughs or moves. Walking one block can cause dyspnea. Taking tylenol prn pain, once or twice daily- helps somewhat. Says that balance is off, and feet feel numb. No headaches. ROS    Physical Exam   Constitutional: He appears well-developed and well-nourished. HENT:   Right Ear: External ear normal.   Left Ear: External ear normal.   Mouth/Throat: Oropharynx is clear and moist.   Neck: No thyromegaly present. Cardiovascular: Normal rate, regular rhythm, normal heart sounds and intact distal pulses. Pulmonary/Chest: Effort normal and breath sounds normal. No respiratory distress. He has no wheezes. Abdominal: Soft. Bowel sounds are normal. He exhibits no distension and no mass. There is no tenderness. There is no guarding. Musculoskeletal: Normal range of motion. He exhibits no edema. R calf- 37.5 cm in diameter, L calf- 35.5 cm in diameter. Lymphadenopathy:     He has no cervical adenopathy. Nursing note and vitals reviewed. ASSESSMENT and PLAN  No orders of the defined types were placed in this encounter. Lung cancer  Follow-up Disposition:  Return in about 2 weeks (around 9/3/2018).

## 2018-08-20 NOTE — PROGRESS NOTES
Chief Complaint   Patient presents with    Shortness of Breath    Rib Pain     muscles around ribs are \"SORE\"     1. Have you been to the ER, urgent care clinic since your last visit? Hospitalized since your last visit? No    2. Have you seen or consulted any other health care providers outside of the [facility] since your last visit? Include any pap smears or colon screening.  No     Health Maintenance Due   Topic Date Due    EYE EXAM RETINAL OR DILATED Q1  06/08/2017    Pneumococcal 65+ High/Highest Risk (2 of 2 - PPSV23) 08/28/2017    HEMOGLOBIN A1C Q6M  04/18/2018    GLAUCOMA SCREENING Q2Y  06/08/2018    Influenza Age 9 to Adult  08/01/2018

## 2018-08-22 ENCOUNTER — APPOINTMENT (OUTPATIENT)
Dept: CT IMAGING | Age: 83
DRG: 181 | End: 2018-08-22
Attending: EMERGENCY MEDICINE
Payer: MEDICARE

## 2018-08-22 ENCOUNTER — HOSPITAL ENCOUNTER (INPATIENT)
Age: 83
LOS: 1 days | Discharge: HOME | DRG: 181 | End: 2018-08-24
Attending: EMERGENCY MEDICINE | Admitting: INTERNAL MEDICINE
Payer: MEDICARE

## 2018-08-22 ENCOUNTER — APPOINTMENT (OUTPATIENT)
Dept: GENERAL RADIOLOGY | Age: 83
DRG: 181 | End: 2018-08-22
Attending: PHYSICIAN ASSISTANT
Payer: MEDICARE

## 2018-08-22 DIAGNOSIS — I10 ACCELERATED HYPERTENSION: ICD-10-CM

## 2018-08-22 DIAGNOSIS — R59.0 THORACIC LYMPHADENOPATHY: ICD-10-CM

## 2018-08-22 DIAGNOSIS — R91.8 MASS OF UPPER LOBE OF RIGHT LUNG: ICD-10-CM

## 2018-08-22 DIAGNOSIS — E87.6 ACUTE HYPOKALEMIA: ICD-10-CM

## 2018-08-22 DIAGNOSIS — R04.2 MASSIVE HEMOPTYSIS: Primary | ICD-10-CM

## 2018-08-22 DIAGNOSIS — C79.51 MALIGNANT NEOPLASM METASTATIC TO RIB WITH UNKNOWN PRIMARY SITE (HCC): ICD-10-CM

## 2018-08-22 DIAGNOSIS — J90 BILATERAL PLEURAL EFFUSION: ICD-10-CM

## 2018-08-22 DIAGNOSIS — C80.1 MALIGNANT NEOPLASM METASTATIC TO RIB WITH UNKNOWN PRIMARY SITE (HCC): ICD-10-CM

## 2018-08-22 LAB
ALBUMIN SERPL-MCNC: 3.3 G/DL (ref 3.5–5)
ALBUMIN/GLOB SERPL: 0.9 {RATIO} (ref 1.1–2.2)
ALP SERPL-CCNC: 675 U/L (ref 45–117)
ALT SERPL-CCNC: 14 U/L (ref 12–78)
ANION GAP SERPL CALC-SCNC: 7 MMOL/L (ref 5–15)
APTT PPP: 27.1 SEC (ref 22.1–32)
AST SERPL-CCNC: 26 U/L (ref 15–37)
BASOPHILS # BLD: 0.1 K/UL (ref 0–0.1)
BASOPHILS NFR BLD: 1 % (ref 0–1)
BILIRUB SERPL-MCNC: 0.4 MG/DL (ref 0.2–1)
BNP SERPL-MCNC: 550 PG/ML (ref 0–450)
BUN SERPL-MCNC: 39 MG/DL (ref 6–20)
BUN/CREAT SERPL: 38 (ref 12–20)
CALCIUM SERPL-MCNC: 8.9 MG/DL (ref 8.5–10.1)
CHLORIDE SERPL-SCNC: 106 MMOL/L (ref 97–108)
CK MB CFR SERPL CALC: 1.4 % (ref 0–2.5)
CK MB SERPL-MCNC: 1 NG/ML (ref 5–25)
CK SERPL-CCNC: 70 U/L (ref 39–308)
CO2 SERPL-SCNC: 27 MMOL/L (ref 21–32)
COMMENT, HOLDF: NORMAL
CREAT BLD-MCNC: 1 MG/DL (ref 0.6–1.3)
CREAT SERPL-MCNC: 1.03 MG/DL (ref 0.7–1.3)
DIFFERENTIAL METHOD BLD: ABNORMAL
EOSINOPHIL # BLD: 0.1 K/UL (ref 0–0.4)
EOSINOPHIL NFR BLD: 2 % (ref 0–7)
ERYTHROCYTE [DISTWIDTH] IN BLOOD BY AUTOMATED COUNT: 16.4 % (ref 11.5–14.5)
GLOBULIN SER CALC-MCNC: 3.6 G/DL (ref 2–4)
GLUCOSE SERPL-MCNC: 157 MG/DL (ref 65–100)
HCT VFR BLD AUTO: 30.9 % (ref 36.6–50.3)
HGB BLD-MCNC: 10.2 G/DL (ref 12.1–17)
IMM GRANULOCYTES # BLD: 0.1 K/UL (ref 0–0.04)
IMM GRANULOCYTES NFR BLD AUTO: 1 % (ref 0–0.5)
INR PPP: 1.1 (ref 0.9–1.1)
LYMPHOCYTES # BLD: 0.8 K/UL (ref 0.8–3.5)
LYMPHOCYTES NFR BLD: 15 % (ref 12–49)
MAGNESIUM SERPL-MCNC: 2 MG/DL (ref 1.6–2.4)
MCH RBC QN AUTO: 27.1 PG (ref 26–34)
MCHC RBC AUTO-ENTMCNC: 33 G/DL (ref 30–36.5)
MCV RBC AUTO: 82 FL (ref 80–99)
MONOCYTES # BLD: 0.4 K/UL (ref 0–1)
MONOCYTES NFR BLD: 7 % (ref 5–13)
NEUTS SEG # BLD: 3.7 K/UL (ref 1.8–8)
NEUTS SEG NFR BLD: 74 % (ref 32–75)
NRBC # BLD: 0 K/UL (ref 0–0.01)
NRBC BLD-RTO: 0 PER 100 WBC
PLATELET # BLD AUTO: 168 K/UL (ref 150–400)
PMV BLD AUTO: 10.6 FL (ref 8.9–12.9)
POTASSIUM SERPL-SCNC: 3.4 MMOL/L (ref 3.5–5.1)
PROT SERPL-MCNC: 6.9 G/DL (ref 6.4–8.2)
PROTHROMBIN TIME: 11.2 SEC (ref 9–11.1)
RBC # BLD AUTO: 3.77 M/UL (ref 4.1–5.7)
RBC MORPH BLD: ABNORMAL
RBC MORPH BLD: ABNORMAL
SAMPLES BEING HELD,HOLD: NORMAL
SODIUM SERPL-SCNC: 140 MMOL/L (ref 136–145)
THERAPEUTIC RANGE,PTTT: NORMAL SECS (ref 58–77)
TROPONIN I SERPL-MCNC: <0.05 NG/ML
WBC # BLD AUTO: 5.2 K/UL (ref 4.1–11.1)

## 2018-08-22 PROCEDURE — 80053 COMPREHEN METABOLIC PANEL: CPT | Performed by: EMERGENCY MEDICINE

## 2018-08-22 PROCEDURE — 99284 EMERGENCY DEPT VISIT MOD MDM: CPT

## 2018-08-22 PROCEDURE — 71275 CT ANGIOGRAPHY CHEST: CPT

## 2018-08-22 PROCEDURE — 82550 ASSAY OF CK (CPK): CPT | Performed by: EMERGENCY MEDICINE

## 2018-08-22 PROCEDURE — 94762 N-INVAS EAR/PLS OXIMTRY CONT: CPT

## 2018-08-22 PROCEDURE — 83735 ASSAY OF MAGNESIUM: CPT | Performed by: EMERGENCY MEDICINE

## 2018-08-22 PROCEDURE — 83880 ASSAY OF NATRIURETIC PEPTIDE: CPT | Performed by: EMERGENCY MEDICINE

## 2018-08-22 PROCEDURE — 93005 ELECTROCARDIOGRAM TRACING: CPT

## 2018-08-22 PROCEDURE — 85730 THROMBOPLASTIN TIME PARTIAL: CPT | Performed by: EMERGENCY MEDICINE

## 2018-08-22 PROCEDURE — 71046 X-RAY EXAM CHEST 2 VIEWS: CPT

## 2018-08-22 PROCEDURE — 82565 ASSAY OF CREATININE: CPT

## 2018-08-22 PROCEDURE — 84484 ASSAY OF TROPONIN QUANT: CPT | Performed by: EMERGENCY MEDICINE

## 2018-08-22 PROCEDURE — 74011636320 HC RX REV CODE- 636/320: Performed by: EMERGENCY MEDICINE

## 2018-08-22 PROCEDURE — 85025 COMPLETE CBC W/AUTO DIFF WBC: CPT | Performed by: EMERGENCY MEDICINE

## 2018-08-22 PROCEDURE — 85610 PROTHROMBIN TIME: CPT | Performed by: EMERGENCY MEDICINE

## 2018-08-22 PROCEDURE — 36415 COLL VENOUS BLD VENIPUNCTURE: CPT | Performed by: EMERGENCY MEDICINE

## 2018-08-22 PROCEDURE — 74011250636 HC RX REV CODE- 250/636: Performed by: EMERGENCY MEDICINE

## 2018-08-22 PROCEDURE — 86900 BLOOD TYPING SEROLOGIC ABO: CPT | Performed by: EMERGENCY MEDICINE

## 2018-08-22 RX ORDER — SODIUM CHLORIDE 9 MG/ML
50 INJECTION, SOLUTION INTRAVENOUS
Status: COMPLETED | OUTPATIENT
Start: 2018-08-22 | End: 2018-08-22

## 2018-08-22 RX ORDER — SODIUM CHLORIDE 0.9 % (FLUSH) 0.9 %
10 SYRINGE (ML) INJECTION
Status: COMPLETED | OUTPATIENT
Start: 2018-08-22 | End: 2018-08-22

## 2018-08-22 RX ADMIN — Medication 10 ML: at 23:21

## 2018-08-22 RX ADMIN — SODIUM CHLORIDE 50 ML/HR: 900 INJECTION, SOLUTION INTRAVENOUS at 23:21

## 2018-08-22 RX ADMIN — IOPAMIDOL 100 ML: 755 INJECTION, SOLUTION INTRAVENOUS at 23:21

## 2018-08-22 NOTE — Clinical Note
Status[de-identified] Inpatient [101] Type of Bed: Stepdown [17] Inpatient Hospitalization Certified Necessary for the Following Reasons: 3. Patient receiving treatment that can only be provided in an inpatient setting (further clarification in H&P documentation) Admitting Diagnosis: Hemoptysis [[id number]] Admitting Physician: Jaylen Fonseca [[id number]] Attending Physician: Alexis Justin Estimated Length of Stay: 2 Midnights Discharge Plan[de-identified] Home with Office Follow-up

## 2018-08-22 NOTE — IP AVS SNAPSHOT
[address] [facility] 
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 Please arrive at 9:30 AM to complete paperwork. [address] [facility] [address] [facility] 
[phone] Milan Nick MD Go on 8/30/2018 Hospital follow-up scheduled at 11:15am ( If you have questions or need to reschedule please call [address] [facility] [phone] Whitney Bryson MD Schedule an appointment as soon as possible for a visit for oncology follow up. MD note indicated that the office will call you directly to schedule follow up appointment. [address] [facility] 
[phone] Your Scheduled Appointments Thursday August 30, 2018 11:15 AM EDT TRANSITIONAL CARE MANAGEMENT with Milan Nick MD  
[facility]) [address] [facility] [phone] Tuesday September 11, 2018  8:45 AM EDT ROUTINE CARE with Milan Nick MD  
[facility]) [address] [phone] Discharge Orders None A check jc indicates which time of day the medication should be taken. My Medications START taking these medications Instructions Each Dose to Equal  
 Morning Noon Evening Bedtime  
 doxycycline 100 mg capsule Commonly known as:  VIBRAMYCIN Your last dose was: Your next dose is: Take 1 Cap by mouth two (2) times a day for 7 days. 100 mg  
    
   
   
   
  
 promethazine-dextromethorphan 6.25-15 mg/5 mL syrup Commonly known as:  PROMETHAZINE-DM Your last dose was: Your next dose is: Take 5 mL by mouth four (4) times daily as needed for Cough. 5 mL CONTINUE taking these medications Instructions Each Dose to Equal  
 Morning Noon Evening Bedtime  
 albuterol 90 mcg/actuation inhaler Commonly known as:  PROVENTIL HFA, VENTOLIN HFA, PROAIR HFA Your last dose was: Your next dose is: Take 2 Puffs by inhalation every four (4) hours as needed for Shortness of Breath. 2 Puff  
    
   
   
   
  
 allopurinol 300 mg tablet Commonly known as:  Ardie Fleischer Your last dose was: Your next dose is: TAKE ONE TABLET BY MOUTH ONCE DAILY TO PREVENT GOUT  
     
   
   
   
  
 ALPRAZolam 0.5 mg tablet Commonly known as:  Shantel Scar Your last dose was: Your next dose is: TAKE ONE TABLET BY MOUTH TWICE DAILY AS NEEDED FOR ANXIETY  
     
   
   
   
  
 atenolol 50 mg tablet Commonly known as:  TENORMIN Your last dose was: Your next dose is: TAKE 1/2  TABLET BY MOUTH TWICE DAILY FOR BLOOD PRESSURE  
     
   
   
   
  
 cetirizine 10 mg tablet Commonly known as:  ZYRTEC Your last dose was: Your next dose is: Take 1 Tab by mouth daily. 10 mg  
    
   
   
   
  
 finasteride 5 mg tablet Commonly known as:  PROSCAR Your last dose was: Your next dose is: Take 1 Tab by mouth daily. For prostate 5 mg fluticasone-vilanterol 100-25 mcg/dose inhaler Commonly known as:  BREO ELLIPTA Your last dose was: Your next dose is: Take 1 Puff by inhalation daily. 1 Puff  
    
   
   
   
  
 hydroCHLOROthiazide 25 mg tablet Commonly known as:  HYDRODIURIL Your last dose was: Your next dose is: TAKE ONE TABLET BY MOUTH ONCE DAILY losartan 25 mg tablet Commonly known as:  COZAAR Your last dose was: Your next dose is: Take 1 Tab by mouth daily. For blood pressure and kidneys 25 mg  
    
   
   
   
  
 MULTIPLE VITAMINS PO Your last dose was: Your next dose is: Take 1 Tab by mouth daily. 1 Tab  
    
   
   
   
  
 pravastatin 20 mg tablet Commonly known as:  PRAVACHOL Your last dose was: Your next dose is: TAKE ONE TABLET BY MOUTH ONCE DAILY AT  NIGHT  FOR  CHOLESTEROL  
     
   
   
   
  
 PRESERVISION AREDS 2 PO Your last dose was: Your next dose is: Take  by mouth daily. terazosin 10 mg capsule Commonly known as:  HYTRIN Your last dose was: Your next dose is: TAKE ONE CAPSULE BY MOUTH ONCE DAILY FOR BLOOD PRESSURE AND PROSTATE  
     
   
   
   
  
 triazolam 0.25 mg tablet Commonly known as:  Brian Zhang Your last dose was: Your next dose is: Take 1 Tab by mouth nightly as needed. Max Daily Amount: 0.25 mg.  
 0.25 mg  
    
   
   
   
  
 VITAMIN D3 1,000 unit Cap Generic drug:  cholecalciferol Your last dose was: Your next dose is: Take 1 Tab by mouth daily. 1 Tab STOP taking these medications   
 aspirin 325 mg tablet Commonly known as:  ASPIRIN  
   
  
 diphenoxylate-atropine 2.5-0.025 mg per tablet Commonly known as:  LOMOTIL  
   
  
 furosemide 40 mg tablet Commonly known as:  LASIX  
   
  
 promethazine-codeine 6.25-10 mg/5 mL syrup Commonly known as:  PHENERGAN with CODEINE Where to Get Your Medications These medications were sent to [facility], [address]  [address] [facility], [address] [place] [address] Phone:  [phone]  
  doxycycline 100 mg capsule  
 losartan 25 mg tablet  
 promethazine-dextromethorphan 6.25-15 mg/5 mL syrup Discharge Instructions PATIENT DISCHARGE INSTRUCTIONS PATIENT DISCHARGE INSTRUCTIONS Grecia Wen / [id number] : 1933 Admitted 2018 Discharged: 2018 · It is important that you take the medication exactly as they are prescribed. · Keep your medication in the bottles provided by the pharmacist and keep a list of the medication names, dosages, and times to be taken in your wallet. · Do not take other medications without consulting your doctor. What to do at [facility] Recommended Diet: Regular Diet Recommended Activity: Activity as tolerated If you experience any of the following symptoms worsening coughing up blood, please follow up with Dr. Charlie Liriano. Signed By: Genoveva Lovett MD   
 2018 ACO Transitions of Care Introducing [facility] 50 Kirstin Lanier offers a voluntary care coordination program to provide high quality service and care to [facility] fee-for-service beneficiaries. Muna Kowalski was designed to help you enhance your health and well-being through the following services: ? Transitions of Care  support for individuals who are transitioning from one care setting to another (example: Hospital to home). ?  Chronic and Complex Care Coordination  support for individuals and caregivers of those with serious or chronic illnesses or with more than one chronic (ongoing) condition and those who take a number of different medications. If you meet specific medical criteria, a [facility] [address] may call you directly to coordinate your care with your primary care physician and your other care providers. For questions about the [facility] programs, please, contact your physicians office. For general questions or additional information about Accountable Care Organizations: 
Please visit www.medicare.gov/acos. html or call 1-800-MEDICARE (3-406.243.7103) TTY users should call 6-450.851.4498. Introducing [facility]! [facility] introduces "[employer] patient portal. Now you can access parts of your medical record, email your doctor's office, and request medication refills online. 1. In your internet browser, go to [email or web address] 2. Click on the First Time User? Click Here link in the Sign In box. You will see the New Member Sign Up page. 3. Enter your "[employer] Access Code exactly as it appears below. You will not need to use this code after youve completed the sign-up process. If you do not sign up before the expiration date, you must request a new code. · "[employer] Access Code: [email or web address] Expires: 10/7/2018  5:46 PM 
 
4. Enter the last four digits of your Social Security Number (xxxx) and Date of Birth (mm/dd/yyyy) as indicated and click Submit. You will be taken to the next sign-up page. 5. Create a WeBRANDt ID. This will be your "[employer] login ID and cannot be changed, so think of one that is secure and easy to remember. 6. Create a "[employer] password. You can change your password at any time. 7. Enter your Password Reset Question and Answer. This can be used at a later time if you forget your password. 8. Enter your e-mail address. You will receive e-mail notification when new information is available in [address] 9. Click Sign Up. You can now view and download portions of your medical record. 10. Click the Download Summary menu link to download a portable copy of your medical information. If you have questions, please visit the Frequently Asked Questions section of the [employer] website. Remember, "[employer] is NOT to be used for urgent needs. For medical emergencies, dial 911. Now available from your iPhone and Android! Introducing Yinka Chaparro As a [facility] patient, I wanted to make you aware of our electronic visit tool called Yinka Chaparro. [employer] 24/7 allows you to connect within minutes with a medical provider 24 hours a day, seven days a week via a mobile device or tablet or logging into a secure website from your computer. You can access Yinka Chaparro from anywhere in the [place]. A virtual visit might be right for you when you have a simple condition and feel like you just dont want to get out of bed, or cant get away from work for an appointment, when your regular [facility] provider is not available (evenings, weekends or holidays), or when youre out of town and need minor care. Electronic visits cost only $49 and if the [facility] 24/7 provider determines a prescription is needed to treat your condition, one can be electronically transmitted to a nearby pharmacy*. Please take a moment to enroll today if you have not already done so. The enrollment process is free and takes just a few minutes. To enroll, please download the [employer] 24/7 rod to your tablet or phone, or visit [email or web address] to enroll on your computer. And, as an [address] [place] [address] patient with a YouGoDo account, the results of your visits will be scanned into your electronic medical record and your primary care provider will be able to view the scanned results.    
We urge you to continue to see your regular [facility] provider for your ongoing medical care. And while your primary care provider may not be the one available when you seek a Yinka Starkeykietfin virtual visit, the peace of mind you get from getting a real diagnosis real time can be priceless. For more information on Yinka Chaparro, view our Frequently Asked Questions (FAQs) at [email or web address]. Sincerely, 
 
Eugene Joe MD 
Chief Medical Officer Jeff Lanier *:  certain medications cannot be prescribed via Yinka Starkeydieudonne Providers Seen During Your Hospitalization Provider Specialty Primary office phone Luis Karimi MD Emergency Medicine [phone] Isaiah Olivier MD Internal Medicine [phone] Carolynn Nicole MD [facility] [phone] Your Primary Care Physician (PCP) Primary Care Physician Office Phone Office Fax Barrett Laic [phone] You are allergic to the following Allergen Reactions Ciprofloxacin Other (comments) Nauseated, felt fuzzy Erythromycin Unknown (comments) Pcn (Penicillins) Swelling Recent Documentation Height Weight BMI Smoking Status 1.702 m 69.3 kg 23.93 kg/m2 Former Smoker Emergency Contacts Name Discharge Info Relation Home Work Mobile [address] CAREGIVER [3] Spouse [3] [phone] Patient Belongings The following personal items are in your possession at time of discharge: 
  Dental Appliances: None  Visual Aid: Glasses, With patient      Home Medications: None   Jewelry: With patient  Clothing: At bedside    Other Valuables: Cell Phone (watch) Please provide this summary of care documentation to your next provider. Signatures-by signing, you are acknowledging that this After Visit Summary has been reviewed with you and you have received a copy.   
  
 
  
    
    
 Patient Signature: ____________________________________________________________ Date:  ____________________________________________________________  
  
Robi Sport Provider Signature:  ____________________________________________________________ Date:  ____________________________________________________________

## 2018-08-23 PROBLEM — C34.12 MALIGNANT NEOPLASM OF UPPER LOBE OF LEFT LUNG (HCC): Status: ACTIVE | Noted: 2018-08-23

## 2018-08-23 PROBLEM — R04.2 HEMOPTYSIS: Status: ACTIVE | Noted: 2018-08-23

## 2018-08-23 PROBLEM — J90 PLEURAL EFFUSION: Status: ACTIVE | Noted: 2018-08-23

## 2018-08-23 LAB
ABO + RH BLD: NORMAL
ANION GAP SERPL CALC-SCNC: 9 MMOL/L (ref 5–15)
ATRIAL RATE: 82 BPM
BASOPHILS # BLD: 0 K/UL (ref 0–0.1)
BASOPHILS NFR BLD: 0 % (ref 0–1)
BLOOD GROUP ANTIBODIES SERPL: NORMAL
BUN SERPL-MCNC: 34 MG/DL (ref 6–20)
BUN/CREAT SERPL: 39 (ref 12–20)
CALCIUM SERPL-MCNC: 8.4 MG/DL (ref 8.5–10.1)
CALCULATED P AXIS, ECG09: 54 DEGREES
CALCULATED R AXIS, ECG10: 90 DEGREES
CALCULATED T AXIS, ECG11: 21 DEGREES
CHLORIDE SERPL-SCNC: 105 MMOL/L (ref 97–108)
CO2 SERPL-SCNC: 25 MMOL/L (ref 21–32)
CREAT SERPL-MCNC: 0.88 MG/DL (ref 0.7–1.3)
DIAGNOSIS, 93000: NORMAL
DIFFERENTIAL METHOD BLD: ABNORMAL
EOSINOPHIL # BLD: 0.1 K/UL (ref 0–0.4)
EOSINOPHIL NFR BLD: 2 % (ref 0–7)
ERYTHROCYTE [DISTWIDTH] IN BLOOD BY AUTOMATED COUNT: 16.2 % (ref 11.5–14.5)
GLUCOSE SERPL-MCNC: 128 MG/DL (ref 65–100)
HCT VFR BLD AUTO: 29.4 % (ref 36.6–50.3)
HGB BLD-MCNC: 9.7 G/DL (ref 12.1–17)
IMM GRANULOCYTES # BLD: 0 K/UL (ref 0–0.04)
IMM GRANULOCYTES NFR BLD AUTO: 1 % (ref 0–0.5)
LYMPHOCYTES # BLD: 0.7 K/UL (ref 0.8–3.5)
LYMPHOCYTES NFR BLD: 16 % (ref 12–49)
MCH RBC QN AUTO: 26.9 PG (ref 26–34)
MCHC RBC AUTO-ENTMCNC: 33 G/DL (ref 30–36.5)
MCV RBC AUTO: 81.7 FL (ref 80–99)
MONOCYTES # BLD: 0.4 K/UL (ref 0–1)
MONOCYTES NFR BLD: 8 % (ref 5–13)
NEUTS SEG # BLD: 3.3 K/UL (ref 1.8–8)
NEUTS SEG NFR BLD: 73 % (ref 32–75)
NRBC # BLD: 0 K/UL (ref 0–0.01)
NRBC BLD-RTO: 0 PER 100 WBC
P-R INTERVAL, ECG05: 164 MS
PLATELET # BLD AUTO: 157 K/UL (ref 150–400)
PMV BLD AUTO: 10.3 FL (ref 8.9–12.9)
POTASSIUM SERPL-SCNC: 3.3 MMOL/L (ref 3.5–5.1)
Q-T INTERVAL, ECG07: 380 MS
QRS DURATION, ECG06: 78 MS
QTC CALCULATION (BEZET), ECG08: 443 MS
RBC # BLD AUTO: 3.6 M/UL (ref 4.1–5.7)
RBC MORPH BLD: ABNORMAL
SODIUM SERPL-SCNC: 139 MMOL/L (ref 136–145)
SPECIMEN EXP DATE BLD: NORMAL
VENTRICULAR RATE, ECG03: 82 BPM
WBC # BLD AUTO: 4.5 K/UL (ref 4.1–11.1)

## 2018-08-23 PROCEDURE — 80048 BASIC METABOLIC PNL TOTAL CA: CPT

## 2018-08-23 PROCEDURE — 65660000000 HC RM CCU STEPDOWN

## 2018-08-23 PROCEDURE — 85025 COMPLETE CBC W/AUTO DIFF WBC: CPT

## 2018-08-23 PROCEDURE — 36415 COLL VENOUS BLD VENIPUNCTURE: CPT

## 2018-08-23 PROCEDURE — 74011250637 HC RX REV CODE- 250/637: Performed by: FAMILY MEDICINE

## 2018-08-23 PROCEDURE — 74011250637 HC RX REV CODE- 250/637: Performed by: EMERGENCY MEDICINE

## 2018-08-23 PROCEDURE — 74011250637 HC RX REV CODE- 250/637: Performed by: INTERNAL MEDICINE

## 2018-08-23 RX ORDER — FLUTICASONE FUROATE AND VILANTEROL 100; 25 UG/1; UG/1
1 POWDER RESPIRATORY (INHALATION) DAILY
Status: DISCONTINUED | OUTPATIENT
Start: 2018-08-23 | End: 2018-08-24 | Stop reason: HOSPADM

## 2018-08-23 RX ORDER — BISACODYL 5 MG
5 TABLET, DELAYED RELEASE (ENTERIC COATED) ORAL DAILY PRN
Status: DISCONTINUED | OUTPATIENT
Start: 2018-08-23 | End: 2018-08-24 | Stop reason: HOSPADM

## 2018-08-23 RX ORDER — LOSARTAN POTASSIUM 25 MG/1
25 TABLET ORAL DAILY
Status: DISCONTINUED | OUTPATIENT
Start: 2018-08-23 | End: 2018-08-24 | Stop reason: HOSPADM

## 2018-08-23 RX ORDER — FINASTERIDE 5 MG/1
5 TABLET, FILM COATED ORAL DAILY
Status: DISCONTINUED | OUTPATIENT
Start: 2018-08-23 | End: 2018-08-24 | Stop reason: HOSPADM

## 2018-08-23 RX ORDER — FUROSEMIDE 40 MG/1
40 TABLET ORAL DAILY
Status: DISCONTINUED | OUTPATIENT
Start: 2018-08-23 | End: 2018-08-24 | Stop reason: HOSPADM

## 2018-08-23 RX ORDER — HYDROCHLOROTHIAZIDE 25 MG/1
25 TABLET ORAL DAILY
Status: DISCONTINUED | OUTPATIENT
Start: 2018-08-23 | End: 2018-08-23

## 2018-08-23 RX ORDER — FUROSEMIDE 40 MG/1
40 TABLET ORAL DAILY
Status: DISCONTINUED | OUTPATIENT
Start: 2018-08-23 | End: 2018-08-23

## 2018-08-23 RX ORDER — ALLOPURINOL 300 MG/1
300 TABLET ORAL DAILY
Status: DISCONTINUED | OUTPATIENT
Start: 2018-08-23 | End: 2018-08-24 | Stop reason: HOSPADM

## 2018-08-23 RX ORDER — ALBUTEROL SULFATE 90 UG/1
2 AEROSOL, METERED RESPIRATORY (INHALATION)
Status: DISCONTINUED | OUTPATIENT
Start: 2018-08-23 | End: 2018-08-24 | Stop reason: HOSPADM

## 2018-08-23 RX ORDER — ONDANSETRON 2 MG/ML
4 INJECTION INTRAMUSCULAR; INTRAVENOUS
Status: DISCONTINUED | OUTPATIENT
Start: 2018-08-23 | End: 2018-08-24 | Stop reason: HOSPADM

## 2018-08-23 RX ORDER — POTASSIUM CHLORIDE 750 MG/1
40 TABLET, FILM COATED, EXTENDED RELEASE ORAL ONCE
Status: COMPLETED | OUTPATIENT
Start: 2018-08-23 | End: 2018-08-23

## 2018-08-23 RX ORDER — SODIUM CHLORIDE 0.9 % (FLUSH) 0.9 %
5-10 SYRINGE (ML) INJECTION EVERY 8 HOURS
Status: DISCONTINUED | OUTPATIENT
Start: 2018-08-23 | End: 2018-08-24 | Stop reason: HOSPADM

## 2018-08-23 RX ORDER — MELATONIN
1000 DAILY
Status: DISCONTINUED | OUTPATIENT
Start: 2018-08-23 | End: 2018-08-24 | Stop reason: HOSPADM

## 2018-08-23 RX ORDER — THERA TABS 400 MCG
1 TAB ORAL DAILY
Status: DISCONTINUED | OUTPATIENT
Start: 2018-08-23 | End: 2018-08-24 | Stop reason: HOSPADM

## 2018-08-23 RX ORDER — ALPRAZOLAM 0.25 MG/1
0.25 TABLET ORAL
Status: DISCONTINUED | OUTPATIENT
Start: 2018-08-23 | End: 2018-08-24 | Stop reason: HOSPADM

## 2018-08-23 RX ORDER — DIPHENOXYLATE HYDROCHLORIDE AND ATROPINE SULFATE 2.5; .025 MG/1; MG/1
1 TABLET ORAL
Status: DISCONTINUED | OUTPATIENT
Start: 2018-08-23 | End: 2018-08-23

## 2018-08-23 RX ORDER — PROMETHAZINE HYDROCHLORIDE AND CODEINE PHOSPHATE 6.25; 1 MG/5ML; MG/5ML
5 SOLUTION ORAL
Status: DISCONTINUED | OUTPATIENT
Start: 2018-08-23 | End: 2018-08-24 | Stop reason: HOSPADM

## 2018-08-23 RX ORDER — SODIUM CHLORIDE 0.9 % (FLUSH) 0.9 %
5-10 SYRINGE (ML) INJECTION AS NEEDED
Status: DISCONTINUED | OUTPATIENT
Start: 2018-08-23 | End: 2018-08-24 | Stop reason: HOSPADM

## 2018-08-23 RX ORDER — ALPRAZOLAM 0.25 MG/1
0.25 TABLET ORAL
Status: DISCONTINUED | OUTPATIENT
Start: 2018-08-23 | End: 2018-08-23

## 2018-08-23 RX ORDER — ACETAMINOPHEN 325 MG/1
650 TABLET ORAL
Status: DISCONTINUED | OUTPATIENT
Start: 2018-08-23 | End: 2018-08-24 | Stop reason: HOSPADM

## 2018-08-23 RX ORDER — LOSARTAN POTASSIUM 25 MG/1
25 TABLET ORAL DAILY
Qty: 90 TAB | Refills: 3 | Status: SHIPPED | OUTPATIENT
Start: 2018-08-23 | End: 2018-10-04 | Stop reason: ALTCHOICE

## 2018-08-23 RX ORDER — CETIRIZINE HCL 10 MG
10 TABLET ORAL DAILY
Status: DISCONTINUED | OUTPATIENT
Start: 2018-08-23 | End: 2018-08-24 | Stop reason: HOSPADM

## 2018-08-23 RX ORDER — PRAVASTATIN SODIUM 10 MG/1
20 TABLET ORAL DAILY
Status: DISCONTINUED | OUTPATIENT
Start: 2018-08-23 | End: 2018-08-24 | Stop reason: HOSPADM

## 2018-08-23 RX ORDER — FUROSEMIDE 80 MG/1
80 TABLET ORAL DAILY
Status: DISCONTINUED | OUTPATIENT
Start: 2018-08-23 | End: 2018-08-23

## 2018-08-23 RX ORDER — POTASSIUM CHLORIDE 20 MEQ/1
40 TABLET, EXTENDED RELEASE ORAL
Status: COMPLETED | OUTPATIENT
Start: 2018-08-23 | End: 2018-08-23

## 2018-08-23 RX ORDER — ATENOLOL 25 MG/1
25 TABLET ORAL EVERY 12 HOURS
Status: DISCONTINUED | OUTPATIENT
Start: 2018-08-23 | End: 2018-08-24 | Stop reason: HOSPADM

## 2018-08-23 RX ORDER — TERAZOSIN 5 MG/1
10 CAPSULE ORAL EVERY EVENING
Status: DISCONTINUED | OUTPATIENT
Start: 2018-08-23 | End: 2018-08-24 | Stop reason: HOSPADM

## 2018-08-23 RX ADMIN — ACETAMINOPHEN 650 MG: 325 TABLET ORAL at 15:05

## 2018-08-23 RX ADMIN — FLUTICASONE FUROATE AND VILANTEROL TRIFENATATE 1 PUFF: 100; 25 POWDER RESPIRATORY (INHALATION) at 08:48

## 2018-08-23 RX ADMIN — Medication 10 ML: at 15:05

## 2018-08-23 RX ADMIN — POTASSIUM CHLORIDE 40 MEQ: 1500 TABLET, EXTENDED RELEASE ORAL at 02:06

## 2018-08-23 RX ADMIN — ALPRAZOLAM 0.25 MG: 0.25 TABLET ORAL at 21:33

## 2018-08-23 RX ADMIN — FINASTERIDE 5 MG: 5 TABLET, FILM COATED ORAL at 08:47

## 2018-08-23 RX ADMIN — LOSARTAN POTASSIUM 25 MG: 25 TABLET ORAL at 08:47

## 2018-08-23 RX ADMIN — ACETAMINOPHEN 650 MG: 325 TABLET ORAL at 18:26

## 2018-08-23 RX ADMIN — ALLOPURINOL 300 MG: 300 TABLET ORAL at 08:47

## 2018-08-23 RX ADMIN — PRAVASTATIN SODIUM 20 MG: 10 TABLET ORAL at 08:47

## 2018-08-23 RX ADMIN — FUROSEMIDE 40 MG: 40 TABLET ORAL at 08:47

## 2018-08-23 RX ADMIN — ALPRAZOLAM 0.12 MG: 0.25 TABLET ORAL at 12:25

## 2018-08-23 RX ADMIN — ATENOLOL 25 MG: 25 TABLET ORAL at 08:47

## 2018-08-23 RX ADMIN — PROMETHAZINE HYDROCHLORIDE AND CODEINE PHOSPHATE 5 ML: 6.25; 1 SOLUTION ORAL at 23:46

## 2018-08-23 RX ADMIN — Medication 10 ML: at 21:33

## 2018-08-23 RX ADMIN — ATENOLOL 25 MG: 25 TABLET ORAL at 03:04

## 2018-08-23 RX ADMIN — Medication 10 ML: at 03:04

## 2018-08-23 RX ADMIN — TERAZOSIN HYDROCHLORIDE 10 MG: 5 CAPSULE ORAL at 18:26

## 2018-08-23 RX ADMIN — POTASSIUM CHLORIDE 40 MEQ: 750 TABLET, EXTENDED RELEASE ORAL at 05:14

## 2018-08-23 RX ADMIN — THERA TABS 1 TABLET: TAB at 08:48

## 2018-08-23 NOTE — PROGRESS NOTES
General Daily Progress Note    Admit Date: 8/22/2018  Hospital day 2    Subjective:     Patient has no complaint of chest pain. Mild dyspnea. Still having moderate hemoptysis. ..   Medication side effects: none    Current Facility-Administered Medications   Medication Dose Route Frequency    terazosin (HYTRIN) capsule 10 mg  10 mg Oral QPM    ALPRAZolam (XANAX) tablet 0.25 mg  0.25 mg Oral QHS PRN    cetirizine (ZYRTEC) tablet 10 mg  10 mg Oral DAILY    pravastatin (PRAVACHOL) tablet 20 mg  20 mg Oral DAILY    losartan (COZAAR) tablet 25 mg  25 mg Oral DAILY    finasteride (PROSCAR) tablet 5 mg  5 mg Oral DAILY    albuterol (PROVENTIL HFA, VENTOLIN HFA, PROAIR HFA) inhaler 2 Puff  2 Puff Inhalation Q4H PRN    fluticasone-vilanterol (BREO ELLIPTA) 100mcg-25mcg/puff  1 Puff Inhalation DAILY    atenolol (TENORMIN) tablet 25 mg  25 mg Oral Q12H    allopurinol (ZYLOPRIM) tablet 300 mg  300 mg Oral DAILY    therapeutic multivitamin (THERAGRAN) tablet 1 Tab  1 Tab Oral DAILY    promethazine-codeine (PHENERGAN with CODEINE) 6.25-10 mg/5 mL syrup 5 mL  5 mL Oral QID PRN    cholecalciferol (VITAMIN D3) tablet 1,000 Units  1,000 Units Oral DAILY    sodium chloride (NS) flush 5-10 mL  5-10 mL IntraVENous Q8H    sodium chloride (NS) flush 5-10 mL  5-10 mL IntraVENous PRN    ondansetron (ZOFRAN) injection 4 mg  4 mg IntraVENous Q4H PRN    bisacodyl (DULCOLAX) tablet 5 mg  5 mg Oral DAILY PRN    furosemide (LASIX) tablet 40 mg  40 mg Oral DAILY        Review of Systems  Pertinent items are noted in HPI.     Objective:     Patient Vitals for the past 8 hrs:   BP Temp Pulse Resp SpO2   08/23/18 0800 127/49 - 72 - 100 %   08/23/18 0733 148/90 97.6 °F (36.4 °C) 76 20 93 %   08/23/18 0406 - - 73 - 95 %   08/23/18 0400 - - 72 - 95 %   08/23/18 0304 170/73 - - - -   08/23/18 0256 170/73 97.4 °F (36.3 °C) 82 20 96 %   08/23/18 0230 131/65 - 83 23 96 %   08/23/18 0201 120/72 - - - 94 %   08/23/18 0145 136/70 - - - 93 % 08/21 1901 - 08/23 0700  In: -   Out: 350 [Urine:350]    Physical Exam:   Visit Vitals    /49    Pulse 72    Temp 97.6 °F (36.4 °C)    Resp 20    Ht 5' 7\" (1.702 m)    Wt 152 lb 12.5 oz (69.3 kg)    SpO2 100%    BMI 23.93 kg/m2     Lungs: rhonchi R base, L base  Heart: regular rate and rhythm, S1, S2 normal, no murmur, click, rub or gallop  Abdomen: soft, non-tender. Bowel sounds normal. No masses,  no organomegaly  Extremities: extremities normal, atraumatic, no cyanosis or edema      ECG: normal sinus rhythm     Data Review   Recent Results (from the past 24 hour(s))   CBC WITH AUTOMATED DIFF    Collection Time: 08/22/18 10:16 PM   Result Value Ref Range    WBC 5.2 4.1 - 11.1 K/uL    RBC 3.77 (L) 4.10 - 5.70 M/uL    HGB 10.2 (L) 12.1 - 17.0 g/dL    HCT 30.9 (L) 36.6 - 50.3 %    MCV 82.0 80.0 - 99.0 FL    MCH 27.1 26.0 - 34.0 PG    MCHC 33.0 30.0 - 36.5 g/dL    RDW 16.4 (H) 11.5 - 14.5 %    PLATELET 428 728 - 234 K/uL    MPV 10.6 8.9 - 12.9 FL    NRBC 0.0 0  WBC    ABSOLUTE NRBC 0.00 0.00 - 0.01 K/uL    NEUTROPHILS 74 32 - 75 %    LYMPHOCYTES 15 12 - 49 %    MONOCYTES 7 5 - 13 %    EOSINOPHILS 2 0 - 7 %    BASOPHILS 1 0 - 1 %    IMMATURE GRANULOCYTES 1 (H) 0.0 - 0.5 %    ABS. NEUTROPHILS 3.7 1.8 - 8.0 K/UL    ABS. LYMPHOCYTES 0.8 0.8 - 3.5 K/UL    ABS. MONOCYTES 0.4 0.0 - 1.0 K/UL    ABS. EOSINOPHILS 0.1 0.0 - 0.4 K/UL    ABS. BASOPHILS 0.1 0.0 - 0.1 K/UL    ABS. IMM.  GRANS. 0.1 (H) 0.00 - 0.04 K/UL    DF SMEAR SCANNED      RBC COMMENTS ANISOCYTOSIS  1+        RBC COMMENTS OVALOCYTES  1+       CK W/ CKMB & INDEX    Collection Time: 08/22/18 10:16 PM   Result Value Ref Range    CK 70 39 - 308 U/L    CK - MB 1.0 <3.6 NG/ML    CK-MB Index 1.4 0 - 2.5     METABOLIC PANEL, COMPREHENSIVE    Collection Time: 08/22/18 10:16 PM   Result Value Ref Range    Sodium 140 136 - 145 mmol/L    Potassium 3.4 (L) 3.5 - 5.1 mmol/L    Chloride 106 97 - 108 mmol/L    CO2 27 21 - 32 mmol/L    Anion gap 7 5 - 15 mmol/L    Glucose 157 (H) 65 - 100 mg/dL    BUN 39 (H) 6 - 20 MG/DL    Creatinine 1.03 0.70 - 1.30 MG/DL    BUN/Creatinine ratio 38 (H) 12 - 20      GFR est AA >60 >60 ml/min/1.73m2    GFR est non-AA >60 >60 ml/min/1.73m2    Calcium 8.9 8.5 - 10.1 MG/DL    Bilirubin, total 0.4 0.2 - 1.0 MG/DL    ALT (SGPT) 14 12 - 78 U/L    AST (SGOT) 26 15 - 37 U/L    Alk. phosphatase 675 (H) 45 - 117 U/L    Protein, total 6.9 6.4 - 8.2 g/dL    Albumin 3.3 (L) 3.5 - 5.0 g/dL    Globulin 3.6 2.0 - 4.0 g/dL    A-G Ratio 0.9 (L) 1.1 - 2.2     MAGNESIUM    Collection Time: 08/22/18 10:16 PM   Result Value Ref Range    Magnesium 2.0 1.6 - 2.4 mg/dL   PTT    Collection Time: 08/22/18 10:16 PM   Result Value Ref Range    aPTT 27.1 22.1 - 32.0 sec    aPTT, therapeutic range     58.0 - 77.0 SECS   PROTHROMBIN TIME + INR    Collection Time: 08/22/18 10:16 PM   Result Value Ref Range    INR 1.1 0.9 - 1.1      Prothrombin time 11.2 (H) 9.0 - 11.1 sec   TROPONIN I    Collection Time: 08/22/18 10:16 PM   Result Value Ref Range    Troponin-I, Qt. <0.05 <0.05 ng/mL   NT-PRO BNP    Collection Time: 08/22/18 10:16 PM   Result Value Ref Range    NT pro- (H) 0 - 450 PG/ML   TYPE & SCREEN    Collection Time: 08/22/18 10:16 PM   Result Value Ref Range    Crossmatch Expiration 08/25/2018     ABO/Rh(D) AB POSITIVE     Antibody screen NEG    SAMPLES BEING HELD    Collection Time: 08/22/18 10:16 PM   Result Value Ref Range    SAMPLES BEING HELD        Add-on orders for these samples will be processed based on acceptable specimen integrity and analyte stability, which may vary by analyte. COMMENT        Add-on orders for these samples will be processed based on acceptable specimen integrity and analyte stability, which may vary by analyte.    POC CREATININE    Collection Time: 08/22/18 10:18 PM   Result Value Ref Range    Creatinine (POC) 1.0 0.6 - 1.3 mg/dL    GFRAA, POC >60 >60 ml/min/1.73m2    GFRNA, POC >60 >60 ml/min/1.73m2   EKG, 12 LEAD, INITIAL    Collection Time: 08/22/18 10:29 PM   Result Value Ref Range    Ventricular Rate 82 BPM    Atrial Rate 82 BPM    P-R Interval 164 ms    QRS Duration 78 ms    Q-T Interval 380 ms    QTC Calculation (Bezet) 443 ms    Calculated P Axis 54 degrees    Calculated R Axis 90 degrees    Calculated T Axis 21 degrees    Diagnosis       Normal sinus rhythm  Rightward axis  No previous ECGs available     METABOLIC PANEL, BASIC    Collection Time: 08/23/18  2:08 AM   Result Value Ref Range    Sodium 139 136 - 145 mmol/L    Potassium 3.3 (L) 3.5 - 5.1 mmol/L    Chloride 105 97 - 108 mmol/L    CO2 25 21 - 32 mmol/L    Anion gap 9 5 - 15 mmol/L    Glucose 128 (H) 65 - 100 mg/dL    BUN 34 (H) 6 - 20 MG/DL    Creatinine 0.88 0.70 - 1.30 MG/DL    BUN/Creatinine ratio 39 (H) 12 - 20      GFR est AA >60 >60 ml/min/1.73m2    GFR est non-AA >60 >60 ml/min/1.73m2    Calcium 8.4 (L) 8.5 - 10.1 MG/DL   CBC WITH AUTOMATED DIFF    Collection Time: 08/23/18  2:08 AM   Result Value Ref Range    WBC 4.5 4.1 - 11.1 K/uL    RBC 3.60 (L) 4.10 - 5.70 M/uL    HGB 9.7 (L) 12.1 - 17.0 g/dL    HCT 29.4 (L) 36.6 - 50.3 %    MCV 81.7 80.0 - 99.0 FL    MCH 26.9 26.0 - 34.0 PG    MCHC 33.0 30.0 - 36.5 g/dL    RDW 16.2 (H) 11.5 - 14.5 %    PLATELET 747 895 - 774 K/uL    MPV 10.3 8.9 - 12.9 FL    NRBC 0.0 0  WBC    ABSOLUTE NRBC 0.00 0.00 - 0.01 K/uL    NEUTROPHILS 73 32 - 75 %    LYMPHOCYTES 16 12 - 49 %    MONOCYTES 8 5 - 13 %    EOSINOPHILS 2 0 - 7 %    BASOPHILS 0 0 - 1 %    IMMATURE GRANULOCYTES 1 (H) 0.0 - 0.5 %    ABS. NEUTROPHILS 3.3 1.8 - 8.0 K/UL    ABS. LYMPHOCYTES 0.7 (L) 0.8 - 3.5 K/UL    ABS. MONOCYTES 0.4 0.0 - 1.0 K/UL    ABS. EOSINOPHILS 0.1 0.0 - 0.4 K/UL    ABS. BASOPHILS 0.0 0.0 - 0.1 K/UL    ABS. IMM.  GRANS. 0.0 0.00 - 0.04 K/UL    DF AUTOMATED      RBC COMMENTS ANISOCYTOSIS  1+               Assessment:     Principal Problem:    Hemoptysis (8/23/2018)    Active Problems:    Essential hypertension (4/30/2010)      Gout (4/30/2010)      Malignant neoplasm of upper lobe of left lung ([address] (8/23/2018)      Pleural effusion (8/23/2018)        Plan:     1. Adenocarcinoma of lung with multiple mets. Apparently po tigressa has been approved, but pt hasnt started on it yet. 2. Recurrent R pleural effusion- has had thoracentesis x 2- would it be reasonable to put in chest tube, and do pleurodesis during this hospital stay? 3. Hemoptysis  4. Grim prognosis- not ready for hospice yet.

## 2018-08-23 NOTE — PROGRESS NOTES
0730 : Report received from [facility]. SBAR, Kardex, Intake/Output, MAR and Recent Results were discussed. Michael Catherine RN    7319 : Patient of Dr. Courtney Dorsey, notified NP, called consult to PCP office to notify Dr. Courtney Dorsey patient has been admitted. 1041 : Patient stated he normally takes xanax . 025mg up to three times a day. Current order is for bedtime only. Spoke with Dr. Courtney Dorsey, new orders received. 1130 : Report given to Mirella Martin RN. SBAR, Kardex, MAR or Recent Results were discussed. Mirella Martin RN assumed care of the pt.     Michael Catherine RN

## 2018-08-23 NOTE — PROGRESS NOTES
PCU SHIFT NURSING NOTE      Bedside and Verbal shift change report given to Martinez Bull RN (oncoming nurse) by Tammi Noriega RN (offgoing nurse). Report included the following information SBAR, Kardex, ED Summary, Procedure Summary, MAR and Recent Results. Shift Summary:   Received report and assumed care of patient. /55 (BP 1 Location: Right arm, BP Patient Position: At rest)  Pulse 73  Temp 97.8 °F (36.6 °C)  Resp 20  Ht 5' 7\" (1.702 m)  Wt 69.3 kg (152 lb 12.5 oz)  SpO2 95%  BMI 23.93 kg/m2   A/Ox4  Lung sounds bilaterally diminished lower bases and coarse in upper airways. Patient coughing up bloody sputum with small clots. Patient reports mild pain 3-4/10 in rib cage and abdomen. Patient moves all extremities and is up ad mariana minimal assist.     1300 Patient requested tylenol for aching in the rib cage area. [address] with Dr. Alex Aguero and received order for 650 mg tylenol every 4 hours as needed for mild pain. Uneventful shift. Patient did not cough up any sputum during my shift [id number]. Pain managed well with tylenol. Xanax 0.125 mg helped with anxiety. 1930 Bedside and Verbal shift change report given to Karlie Vogt RN (oncoming nurse) by Martinez Bull RN (offgoing nurse). Report included the following information SBAR, ED Summary, Procedure Summary, Intake/Output, MAR, Accordion and Recent Results.          Admission Date 8/22/2018   Admission Diagnosis Hemoptysis  Hemoptysis   Consults IP CONSULT TO HOSPITALIST  IP CONSULT TO ONCOLOGY  IP CONSULT TO PRIMARY CARE PROVIDER        Consults   []PT   []OT   []Speech   []Case Management      [] Palliative      Cardiac Monitoring Order   [x]Yes   []No     IV drips   []Yes    Drip:                            Dose:  Drip:                            Dose:  Drip:                            Dose:   [x]No     GI Prophylaxis   []Yes   [x]No         DVT Prophylaxis   SCDs:  Sequential Compression Device: Bilateral     Patient Refused VTE Prophylaxis: Yes Dusty stockings:         [] Medication   [x]Contraindicated   []None      Activity Level Activity Level: Up ad mariana     Activity Assistance: No assistance needed   Purposeful Rounding every 1-2 hour? [x]Yes   Vásquez Score  Total Score: 1   Bed Alarm (If score 3 or >)   []Yes   [] Refused (See signed refusal form in chart)   Michael Score  Michael Score: 23   Michael Score (if score 14 or less)   []PMT consult   []Wound Care consult      []Specialty bed   [] Nutrition consult          Needs prior to discharge:   Home O2 required:    []Yes   [x]No    If yes, how much O2 required? Other:    Last Bowel Movement: Last Bowel Movement Date: 08/20/18      Influenza Vaccine Received Flu Vaccine for Current Season (usually Sept-March): Not Flu Season        Pneumonia Vaccine           Diet Active Orders   Diet    DIET CARDIAC Regular; Consistent Carb 1800kcal      LDAs               Peripheral IV 08/22/18 Right Antecubital (Active)   Site Assessment Clean, dry, & intact 8/23/2018  8:00 AM   Phlebitis Assessment 0 8/23/2018  8:00 AM   Infiltration Assessment 0 8/23/2018  8:00 AM   Dressing Status Clean, dry, & intact 8/23/2018  8:00 AM   Dressing Type Transparent 8/23/2018  8:00 AM   Hub Color/Line Status Pink;Capped 8/23/2018  8:00 AM   Action Taken Blood drawn 8/22/2018 10:41 PM   Alcohol Cap Used Yes 8/23/2018  2:56 AM                      Urinary Catheter      Intake & Output   Date 08/22/18 0700 - 08/23/18 0659 08/23/18 0700 - 08/24/18 0659   Shift 0700-1859 1900-0659 24 Hour Total 0700-1859 1900-0659 24 Hour Total   I  N  T  A  K  E   P.O.    240  240      P. O.    240  240    Shift Total  (mL/kg)    240  (3.5)  240  (3.5)   O  U  T  P  U  T   Urine  350  (0.4) 350  (0.2)         Urine Voided  350 350       Shift Total  (mL/kg)  350  (5.1) 350  (5.1)      NET  -350 -350 240  240   Weight (kg)  69.3 69.3 69.3 69.3 69.3         Readmission Risk Assessment Tool Score High Risk            25       Total Score        3 Has Seen PCP in Last 6 Months (Yes=3, No=0)    5 Pt. Coverage (Medicare=5 , Medicaid, or Self-Pay=4)    17 Charlson Comorbidity Score (Age + Comorbid Conditions)        Criteria that do not apply:    . Living with Significant Other. Assisted Living. LTAC. SNF.  or   Rehab    Patient Length of Stay (>5 days = 3)    IP Visits Last 12 Months (1-3=4, 4=9, >4=11)       Expected Length of Stay - - -   Actual Length of Stay 0

## 2018-08-23 NOTE — ED NOTES
Assumed care of pt from Radu Oropeza RN at bedside with verbal report consisting of Situation, Background, Assessment, and Recommendations (SBAR). Pt is A&O x 4. Pt resting comfortably on the stretcher in a position of comfort. Call bell within reach. Side rails x 2. Cardiac monitor x 3. Stretcher locked in the lowest position. Concerns and questions addressed at this time. Pt in no acute distress at this the time. Will continue to monitor. Pt in CT at this time.

## 2018-08-23 NOTE — ED NOTES
TRANSFER - OUT REPORT:    Verbal report given to Xi Alcazar on Bill Arnold  being transferred to [id number] [facility] for routine progression of care       Report consisted of patients Situation, Background, Assessment and   Recommendations(SBAR). Information from the following report(s) SBAR, Kardex, ED Summary, [facility] and Recent Results was reviewed with the receiving nurse. Lines:   Peripheral IV 08/22/18 Right Antecubital (Active)   Site Assessment Clean, dry, & intact 8/22/2018 10:41 PM   Phlebitis Assessment 0 8/22/2018 10:41 PM   Infiltration Assessment 0 8/22/2018 10:41 PM   Dressing Status Clean, dry, & intact 8/22/2018 10:41 PM   Dressing Type Transparent;Tape 8/22/2018 10:41 PM   Hub Color/Line Status Patent; Flushed;Pink 8/22/2018 10:41 PM   Action Taken Blood drawn 8/22/2018 10:41 PM        Opportunity for questions and clarification was provided.       Patient transported with:   Registered Nurse

## 2018-08-23 NOTE — PROGRESS NOTES
Problem: Falls - Risk of  Goal: *Absence of Falls  Document Allen Fall Risk and appropriate interventions in the flowsheet.    Outcome: Progressing Towards Goal  Fall Risk Interventions:  Mobility Interventions: Patient to call before getting OOB         Medication Interventions: Bed/chair exit alarm, Patient to call before getting OOB, Teach patient to arise slowly

## 2018-08-23 NOTE — ED PROVIDER NOTES
EMERGENCY DEPARTMENT HISTORY AND PHYSICAL EXAM      Date: 8/22/2018  Patient Name: Shyla Oliveros    History of Presenting Illness     Chief Complaint   Patient presents with    Hemoptysis     Patient ambulatory to triage with steady gait and complain of hemoptysis        History Provided By: Patient and Patient's Wife    HPI: Shyla lOiveros, 80 y.o. male with PMHx significant for DM / gout, presents ambulatory accompanied by wife to the ED with cc of intermittent episodes of hemoptysis x a few months that became acutely worse earlier today. Pt complains of associated intermittent cough productive with sputum, mild SOB, and mild intermittent non-radiating central CP that is only present when coughing. He also reports an episode of epigastric abdominal pain earlier today that resolved after taking Tylenol, notes that he was last dosed with Tylenol just PTA in the ED. He states that the blood present in his sputum was initially dark and clumpy earlier today and has intermittently been bright red. Pt endorses ~12 episodes of hemoptysis today. Pt notes that her was recently diagnosed in June of this year with lung CA, followed by Dr. Ricco Bains. He endorses taking daily ASA as well as PRN Tylenol but denies taking any other daily medications and specifically denies taking any other blood thinning medications. Pt states that he has had a biopsy of a lung mass performed several months ago but has not been told of he results and has not started treatment yet. He reports that his last CT scan was a few weeks ago and notes that he has not had an MRI done previously. Pt specifically denies nausea, vomiting, fever, or chills. There are no other complaints, changes, or physical findings at this time.     PCP: Irais Patricio MD    Current Facility-Administered Medications   Medication Dose Route Frequency Provider Last Rate Last Dose    potassium chloride (K-DUR, KLOR-CON) SR tablet 40 mEq  40 mEq Oral Carlee Neri MD         Current Outpatient Prescriptions   Medication Sig Dispense Refill    triazolam (HALCION) 0.25 mg tablet Take 1 Tab by mouth nightly as needed. Max Daily Amount: 0.25 mg. 90 Tab 1    ALPRAZolam (XANAX) 0.5 mg tablet TAKE ONE TABLET BY MOUTH TWICE DAILY AS NEEDED FOR ANXIETY 60 Tab 3    allopurinol (ZYLOPRIM) 300 mg tablet TAKE ONE TABLET BY MOUTH ONCE DAILY TO PREVENT GOUT 90 Tab 12    albuterol (PROVENTIL HFA, VENTOLIN HFA, PROAIR HFA) 90 mcg/actuation inhaler Take 2 Puffs by inhalation every four (4) hours as needed for Shortness of Breath. 1 Inhaler 12    atenolol (TENORMIN) 50 mg tablet TAKE 1/2  TABLET BY MOUTH TWICE DAILY FOR BLOOD PRESSURE 180 Tab 3    losartan (COZAAR) 25 mg tablet Take 1 Tab by mouth daily. For blood pressure and kidneys 30 Tab 12    cetirizine (ZYRTEC) 10 mg tablet Take 1 Tab by mouth daily. 15 Tab 5    fluticasone-vilanterol (BREO ELLIPTA) 100-25 mcg/dose inhaler Take 1 Puff by inhalation daily. 1 Inhaler 12    finasteride (PROSCAR) 5 mg tablet Take 1 Tab by mouth daily. For prostate 30 Tab 12    promethazine-codeine (PHENERGAN WITH CODEINE) 6.25-10 mg/5 mL syrup Take 5 mL by mouth four (4) times daily as needed for Cough. Max Daily Amount: 20 mL. 180 mL 1    hydroCHLOROthiazide (HYDRODIURIL) 25 mg tablet TAKE ONE TABLET BY MOUTH ONCE DAILY 90 Tab 3    pravastatin (PRAVACHOL) 20 mg tablet TAKE ONE TABLET BY MOUTH ONCE DAILY AT  NIGHT  FOR  CHOLESTEROL 90 Tab 3    terazosin (HYTRIN) 10 mg capsule TAKE ONE CAPSULE BY MOUTH ONCE DAILY FOR BLOOD PRESSURE AND PROSTATE 90 Cap 12    VIT C/E/ZN/COPPR/LUTEIN/ZEAXAN (PRESERVISION AREDS 2 PO) Take  by mouth daily.  diphenoxylate-atropine (LOMOTIL) 2.5-0.025 mg per tablet Take 1 Tab by mouth four (4) times daily as needed for Diarrhea. Max Daily Amount: 4 Tabs.  30 Tab 0    furosemide (LASIX) 40 mg tablet One tab po daily as needed for fluid 30 Tab 5    Cholecalciferol, Vitamin D3, (VITAMIN D3) 1,000 unit Cap Take 1 Tab by mouth daily.  aspirin (ASPIRIN) 325 mg tablet Take 325 mg by mouth daily.  MULTIVITAMINS (MULTIPLE VITAMINS PO) Take 1 Tab by mouth daily. Past History     Past Medical History:  Past Medical History:   Diagnosis Date    BP (high blood pressure) 4/30/2010    Cancer ([address]     Lung    Diabetes mellitus, type 2 (HCC)     Elevated PSA 4/30/2010    Elevated PSA     has had 2 biopsies, both were negative for cancer    Gout 4/30/2010       Past Surgical History:  Past Surgical History:   Procedure Laterality Date    CHEST SURGERY PROCEDURE UNLISTED      Previous thoracenteses    HX COLONOSCOPY      HX HERNIA REPAIR         Family History:  Family History   Problem Relation Age of Onset    Diabetes Brother        Social History:  Social History   Substance Use Topics    Smoking status: Former Smoker     Quit date: 1/1/1965    Smokeless tobacco: Never Used      Comment: 1965    Alcohol use No       Allergies: Allergies   Allergen Reactions    Ciprofloxacin Other (comments)     Nauseated, felt fuzzy    Erythromycin Unknown (comments)    Pcn [Penicillins] Swelling         Review of Systems   Review of Systems   Constitutional: Negative. Negative for chills and fever. HENT: Negative. Negative for congestion, facial swelling, rhinorrhea, sore throat, trouble swallowing and voice change. Eyes: Negative. Respiratory: Positive for cough (productive with sputum and blood) and shortness of breath. Negative for apnea, chest tightness and wheezing. Cardiovascular: Positive for chest pain. Negative for palpitations and leg swelling. Gastrointestinal: Positive for abdominal pain. Negative for abdominal distention, blood in stool, constipation, diarrhea, nausea and vomiting. Endocrine: Negative. Negative for cold intolerance, heat intolerance and polyuria. Genitourinary: Negative.   Negative for difficulty urinating, dysuria, flank pain, frequency, hematuria and urgency. Musculoskeletal: Negative. Negative for arthralgias, back pain, myalgias, neck pain and neck stiffness. Skin: Negative. Negative for color change and rash. Neurological: Negative. Negative for dizziness, syncope, facial asymmetry, speech difficulty, weakness, light-headedness, numbness and headaches. Hematological: Negative. Does not bruise/bleed easily. Psychiatric/Behavioral: Negative. Negative for confusion and self-injury. The patient is not nervous/anxious. Physical Exam   Physical Exam   Constitutional: He is oriented to person, place, and time. Vital signs are normal. He appears well-developed. He is cooperative. Non-toxic appearance. Cachectic and ill-appearing   HENT:   Head: Normocephalic and atraumatic. Mouth/Throat: Mucous membranes are normal. No posterior oropharyngeal erythema. Eyes: Conjunctivae and EOM are normal. Pupils are equal, round, and reactive to light. Neck: Normal range of motion. Cardiovascular: Normal rate, regular rhythm, normal heart sounds and intact distal pulses. Exam reveals no gallop and no friction rub. No murmur heard. Pulmonary/Chest: Effort normal and breath sounds normal. No respiratory distress. He has no wheezes. He has no rales. Reproducible chest wall tenderness   Abdominal: Soft. Bowel sounds are normal. He exhibits no distension and no mass. There is no tenderness. There is no rebound and no guarding. Musculoskeletal: Normal range of motion. He exhibits no edema, tenderness or deformity. Neurological: He is alert and oriented to person, place, and time. He displays normal reflexes. No cranial nerve deficit. He exhibits normal muscle tone. Coordination normal.   Skin: Skin is warm. No rash noted. Psychiatric: He has a normal mood and affect. Nursing note and vitals reviewed.         Diagnostic Study Results     Labs -     Recent Results (from the past 12 hour(s))   CBC WITH AUTOMATED DIFF    Collection Time: 08/22/18 10:16 PM   Result Value Ref Range    WBC 5.2 4.1 - 11.1 K/uL    RBC 3.77 (L) 4.10 - 5.70 M/uL    HGB 10.2 (L) 12.1 - 17.0 g/dL    HCT 30.9 (L) 36.6 - 50.3 %    MCV 82.0 80.0 - 99.0 FL    MCH 27.1 26.0 - 34.0 PG    MCHC 33.0 30.0 - 36.5 g/dL    RDW 16.4 (H) 11.5 - 14.5 %    PLATELET 316 541 - 634 K/uL    MPV 10.6 8.9 - 12.9 FL    NRBC 0.0 0  WBC    ABSOLUTE NRBC 0.00 0.00 - 0.01 K/uL    NEUTROPHILS 74 32 - 75 %    LYMPHOCYTES 15 12 - 49 %    MONOCYTES 7 5 - 13 %    EOSINOPHILS 2 0 - 7 %    BASOPHILS 1 0 - 1 %    IMMATURE GRANULOCYTES 1 (H) 0.0 - 0.5 %    ABS. NEUTROPHILS 3.7 1.8 - 8.0 K/UL    ABS. LYMPHOCYTES 0.8 0.8 - 3.5 K/UL    ABS. MONOCYTES 0.4 0.0 - 1.0 K/UL    ABS. EOSINOPHILS 0.1 0.0 - 0.4 K/UL    ABS. BASOPHILS 0.1 0.0 - 0.1 K/UL    ABS. IMM. GRANS. 0.1 (H) 0.00 - 0.04 K/UL    DF SMEAR SCANNED      RBC COMMENTS ANISOCYTOSIS  1+        RBC COMMENTS OVALOCYTES  1+       CK W/ CKMB & INDEX    Collection Time: 08/22/18 10:16 PM   Result Value Ref Range    CK 70 39 - 308 U/L    CK - MB 1.0 <3.6 NG/ML    CK-MB Index 1.4 0 - 2.5     METABOLIC PANEL, COMPREHENSIVE    Collection Time: 08/22/18 10:16 PM   Result Value Ref Range    Sodium 140 136 - 145 mmol/L    Potassium 3.4 (L) 3.5 - 5.1 mmol/L    Chloride 106 97 - 108 mmol/L    CO2 27 21 - 32 mmol/L    Anion gap 7 5 - 15 mmol/L    Glucose 157 (H) 65 - 100 mg/dL    BUN 39 (H) 6 - 20 MG/DL    Creatinine 1.03 0.70 - 1.30 MG/DL    BUN/Creatinine ratio 38 (H) 12 - 20      GFR est AA >60 >60 ml/min/1.73m2    GFR est non-AA >60 >60 ml/min/1.73m2    Calcium 8.9 8.5 - 10.1 MG/DL    Bilirubin, total 0.4 0.2 - 1.0 MG/DL    ALT (SGPT) 14 12 - 78 U/L    AST (SGOT) 26 15 - 37 U/L    Alk.  phosphatase 675 (H) 45 - 117 U/L    Protein, total 6.9 6.4 - 8.2 g/dL    Albumin 3.3 (L) 3.5 - 5.0 g/dL    Globulin 3.6 2.0 - 4.0 g/dL    A-G Ratio 0.9 (L) 1.1 - 2.2     MAGNESIUM    Collection Time: 08/22/18 10:16 PM   Result Value Ref Range    Magnesium 2.0 1.6 - 2.4 mg/dL   PTT Collection Time: 08/22/18 10:16 PM   Result Value Ref Range    aPTT 27.1 22.1 - 32.0 sec    aPTT, therapeutic range     58.0 - 77.0 SECS   PROTHROMBIN TIME + INR    Collection Time: 08/22/18 10:16 PM   Result Value Ref Range    INR 1.1 0.9 - 1.1      Prothrombin time 11.2 (H) 9.0 - 11.1 sec   TROPONIN I    Collection Time: 08/22/18 10:16 PM   Result Value Ref Range    Troponin-I, Qt. <0.05 <0.05 ng/mL   NT-PRO BNP    Collection Time: 08/22/18 10:16 PM   Result Value Ref Range    NT pro- (H) 0 - 450 PG/ML   TYPE & SCREEN    Collection Time: 08/22/18 10:16 PM   Result Value Ref Range    Crossmatch Expiration 08/25/2018     ABO/Rh(D) AB POSITIVE     Antibody screen NEG    SAMPLES BEING HELD    Collection Time: 08/22/18 10:16 PM   Result Value Ref Range    SAMPLES BEING HELD        Add-on orders for these samples will be processed based on acceptable specimen integrity and analyte stability, which may vary by analyte. COMMENT        Add-on orders for these samples will be processed based on acceptable specimen integrity and analyte stability, which may vary by analyte. POC CREATININE    Collection Time: 08/22/18 10:18 PM   Result Value Ref Range    Creatinine (POC) 1.0 0.6 - 1.3 mg/dL    GFRAA, POC >60 >60 ml/min/1.73m2    GFRNA, POC >60 >60 ml/min/1.73m2   EKG, 12 LEAD, INITIAL    Collection Time: 08/22/18 10:29 PM   Result Value Ref Range    Ventricular Rate 82 BPM    Atrial Rate 82 BPM    P-R Interval 164 ms    QRS Duration 78 ms    Q-T Interval 380 ms    QTC Calculation (Bezet) 443 ms    Calculated P Axis 54 degrees    Calculated R Axis 90 degrees    Calculated T Axis 21 degrees    Diagnosis       Normal sinus rhythm  Rightward axis  No previous ECGs available         Radiologic Studies -   CTA CHEST W OR W WO CONT   Final Result      XR CHEST PA LAT   Final Result        CT Results  (Last 48 hours)               08/22/18 2320  CTA CHEST W OR W WO CONT Final result    Impression:  IMPRESSION:    1. There is no acute pulmonary embolism. 2. Increase in size of a right upper lobe lung mass. Increased right upper lobe   atelectasis. There is no new lung nodule. 3. Thoracic metastatic lymphadenopathy is again demonstrated. 3. Moderate right and small left pleural effusions. 4. A left pleural nodule measuring 1.2 cm is suspicious for metastatic disease. 5. Scattered bony metastases best seen in the right fifth anteriorly and left   sixth rib posteriorly. Narrative:  EXAM:  CT angiography chest       INDICATION:  Hemoptysis and shortness of breath. Right upper lobe lung cancer. COMPARISON: Chest radiographs 8/22/2018. CT chest 6/26/2018. TECHNIQUE: Helical thin section chest CT following uneventful intravenous   administration of nonionic contrast according to departmental PE protocol. Coronal and sagittal reformats were performed. 3D/MIP post processing was   performed. CT dose reduction was achieved through use of a standardized protocol   tailored for this examination and automatic exposure control for dose   modulation. FINDINGS: This is a good quality study for the evaluation of pulmonary embolism   to the first subsegmental arterial level. There is no pulmonary embolism to this   level. The visualized thyroid gland is unremarkable. The aorta and main pulmonary   artery are stable in caliber. Cardiac size is within normal limits. No   pericardial effusion. Enlarged left axillary, mediastinal, and right hilar lymph nodes are noted. A right upper lobe mass measures 5.0 x 6.4 cm, previously 5.0 x 5.5 cm. There is   increased atelectasis anteriorly. There are moderate right and small left   pleural effusions. There is a left pleural nodule measuring 0.8 x 1.2 cm (series   4, image 63). There is no pneumothorax. 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 There is no acute abnormality in the visualized   upper abdomen.  There are scattered sclerotic bony metastases best visualized in   the left sixth rib posteriorly and right fifth rib anteriorly. CXR Results  (Last 48 hours)               08/22/18 2219  XR CHEST PA LAT Final result    Impression:  IMPRESSION:   1. Moderate right pleural effusion. Stable right lung opacity. 2. Trace left pleural effusion. 3. New round opacity in the lateral mid left lung may represent a lung nodule   versus left rib periosteal reaction. Narrative:  EXAM:  XR CHEST PA LAT       INDICATION:  Hemoptysis. COMPARISON: 8/8/2018       TECHNIQUE: PA and lateral chest views       FINDINGS: The cardiomediastinal contours are stable. The pulmonary vasculature   is within normal limits. There is a moderate right pleural effusion. Right lung opacity is unchanged. There is a new rounded opacity in the lateral mid left lung versus rib   periosteal reaction. There is a trace left pleural effusion. There is no   pneumothorax. The bones and upper abdomen are stable. Medical Decision Making   I am the first provider for this patient. I reviewed the vital signs, available nursing notes, past medical history, past surgical history, family history and social history. Vital Signs-Reviewed the patient's vital signs. Patient Vitals for the past 12 hrs:   Temp Pulse Resp BP SpO2   08/22/18 2345 - - - 139/74 95 %   08/22/18 2200 - - - 147/73 95 %   08/22/18 2148 - - - - 94 %   08/22/18 2146 - - - 142/80 -   08/22/18 2057 98.2 °F (36.8 °C) 95 16 (!) 203/104 95 %       Pulse Oximetry Analysis - 95% on RA    EKG interpretation: (Preliminary)  22:30  Rhythm: normal sinus rhythm; and regular .  Rate (approx.): 82; Axis: rightward axis; AR interval: normal; QRS interval: normal ; ST/T wave: normal.   Written by JESSICA Dawson, as dictated by Franky Martinez MD.    Records Reviewed: Nursing Notes, Old Medical Records, Previous electrocardiograms, Previous Radiology Studies and Previous Laboratory Studies    Provider Notes (Medical Decision Making):   DDx metastatic lung CA, anemia, less likely ACS or PE    80year old male recently diagnosed with lung CA, followed by Dr. Kitty Jorge presenting with multiple episodes of hemoptysis. Vitals are stable at this time. Will proceed with labs, EKG, CT of the chest, and reassess for possible admission. ED Course:   Initial assessment performed. The patients presenting problems have been discussed, and they are in agreement with the care plan formulated and outlined with them. I have encouraged them to ask questions as they arise throughout their visit.     Medications   terazosin (HYTRIN) capsule 10 mg (not administered)   ALPRAZolam (XANAX) tablet 0.25 mg (not administered)   cetirizine (ZYRTEC) tablet 10 mg (not administered)   pravastatin (PRAVACHOL) tablet 20 mg (not administered)   losartan (COZAAR) tablet 25 mg (not administered)   finasteride (PROSCAR) tablet 5 mg (not administered)   albuterol (PROVENTIL HFA, VENTOLIN HFA, PROAIR HFA) inhaler 2 Puff (not administered)   fluticasone-vilanterol (BREO ELLIPTA) 100mcg-25mcg/puff (not administered)   atenolol (TENORMIN) tablet 25 mg (25 mg Oral Given 8/23/18 0304)   allopurinol (ZYLOPRIM) tablet 300 mg (not administered)   therapeutic multivitamin (THERAGRAN) tablet 1 Tab (not administered)   promethazine-codeine (PHENERGAN with CODEINE) 6.25-10 mg/5 mL syrup 5 mL (not administered)   hydroCHLOROthiazide (HYDRODIURIL) tablet 25 mg (not administered)   cholecalciferol (VITAMIN D3) tablet 1,000 Units (not administered)   sodium chloride (NS) flush 5-10 mL ( IntraVENous Canceled Entry 8/23/18 0600)   sodium chloride (NS) flush 5-10 mL (not administered)   ondansetron (ZOFRAN) injection 4 mg (not administered)   bisacodyl (DULCOLAX) tablet 5 mg (not administered)   potassium chloride SR (KLOR-CON 10) tablet 40 mEq (not administered)   furosemide (LASIX) tablet 40 mg (not administered)   iopamidol (ISOVUE-370) 76 % injection 100 mL (100 mL IntraVENous Given 8/22/18 2321)   sodium chloride (NS) flush 10 mL (10 mL IntraVENous Given 8/22/18 2321)   0.9% sodium chloride infusion (50 mL/hr IntraVENous New Bag 8/22/18 2321)   potassium chloride (K-DUR, KLOR-CON) SR tablet 40 mEq (40 mEq Oral Given 8/23/18 0206)       Progress Note:  9:58 PM  I have reviewed medical records in the EHR, pertinent to patients present condition/presenting problems. Per records, pt's most recent CT was on 7/19 with the following interpretation: \"Lytic appearing lesion in L3 vertebra measuring 1.8 x 1.4 cm suspicious for  metastatic disease. Appears new when compared to prior study from February 13, 2018. \" Pt had a thoracentesis on 8/8/18. Written by JESSICA Miller, as dictated by Claudio Burrell MD.    Progress Note:  10:56 PM  Pt re-evaluated. Pt had 1 episode of hemoptysis, I have visualized the clot and is approximately 2 finger-lengths in size. Concerns for massive hemoptysis and concerns for pulmonary compromise. Will likely need PCU admission. Pulmonary consult for possible bronchoscopy. Written by JESSICA Miller, as dictated by Claudio Burrell MD.    CONSULT NOTE:   11:56 PM  Claudio Burrell MD spoke with Holley Reynolds MD,   Specialty: Hospitalist  Discussed pt's hx, disposition, and available diagnostic and imaging results. Reviewed care plans. Consultant will evaluate pt for admission. Written by JESSICA Miller, as dictated by Claudio Burrell MD.    Progress Note:  12:04 AM  Patient is being admitted to the hospital.  The results of their tests and reasons for their admission have been discussed with them and/or available family. They convey agreement and understanding for the need to be admitted and for their admission diagnosis. Consultation has been made with the inpatient physician specialist for hospitalization.       Critical Care Time:   CRITICAL CARE NOTE :    1:15 AM  IMPENDING DETERIORATION -Airway, Respiratory and Cardiovascular  ASSOCIATED RISK FACTORS - Hypotension, Shock, Hypoxia, Bleeding and Dysrhythmia  MANAGEMENT- Bedside Assessment and Supervision of Care  INTERPRETATION -  Xrays, CT Scan, ECG, Blood Pressure and Cardiac Output Measures   INTERVENTIONS - hemodynamic mngmt, Metobolic interventions and acute hemoptysis requiring frequent monitoring and resuscitation; accelerated hypertension requiring medication therapy   CASE REVIEW - Hospitalist, Nursing, Family and PCP  TREATMENT RESPONSE -Stable  PERFORMED BY - Self    NOTES   :    I have spent 70 minutes of critical care time involved in lab review, consultations with specialist, family decision- making, bedside attention and documentation. During this entire length of time I was immediately available to the patient . Critical Care: The reason for providing this level of medical care for this critically ill patient was due to a critical illness that impaired one or more vital organ systems, such that there was a high probability of imminent or life threatening deterioration in the patient's condition. This care involved high complexity decision making to assess, manipulate, and support vital system functions, to treat this degree of vital organ system failure, and to prevent further life threatening deterioration of the patients condition. Indira Tran MD    Disposition:  11:56 PM  Patient is being admitted to the hospital by Dr. Amanda Lopez. The results of their tests and reasons for their admission have been discussed with them and/or available family. They convey agreement and understanding for the need to be admitted and for their admission diagnosis. Consultation has been made with the inpatient physician specialist for hospitalization. Written by JESSICA Pearl, as dictated by Indira Tran MD.    PLAN: Admit to Hospitalist    Diagnosis     Clinical Impression:   1. Massive hemoptysis    2. Acute hypokalemia    3. Mass of upper lobe of right lung    4. Thoracic lymphadenopathy    5. Bilateral pleural effusion    6. 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 Accelerated hypertension        Attestations: This note is prepared by Kelvin Lopez, acting as Scribe for Melo Velázquez MD.    The scribe's documentation has been prepared under my direction and personally reviewed by me in its entirety. I confirm that the note above accurately reflects all work, treatment, procedures, and medical decision making performed by me. Melo Velázquez MD        This note will not be viewable in [address]

## 2018-08-23 NOTE — ED NOTES
Patient reports hemoptysis worsening this evening. Patient has history of lung cancer, was diagnosed in June, and has experienced intermittent hemoptysis since then. Patient states blood has been a mixture of bright red and dark red.

## 2018-08-23 NOTE — CONSULTS
Hematology Oncology Consultation    Reason for consult: EGFR+ NSCLC    Admitting Diagnosis: Hemoptysis  Hemoptysis    Impression: Mr. Kvng Correa has a pmh of stage IV NSCLC admitted with hemoptysis:    *) Hemoptysis:  -- Due to lung cancer,scan didn't show large eroding lesion. If hemoptysis continues and is problematic could consider possible xrt, however, will need area to target and don't see obvious source on scan unless it's from primary main lesion? INR WNL. Agree with d/c ASA and keep off.  -- I will start tussonex for cough control  -- H/H stable but will continue to montior. --- Transfuse for hbg <7    *) Stage IV NSCLC, bone mets:  -- He has EGFR+ disease and plans to start first line Tagrisso per Dr. Ivone Bateman. Medication being delivered to office next Tuesday. I will have Ann Marie Busing, Dr Jose Antonio Gonzalez nurse call patient about medication questions of delivery. -- Reminded him to get dental appt in prep for Xgeva given bone mets. *) Rt Sided Recurrent Pleural Effusion:  -- He has recurrent malignant effusion, now has moderate effusion. Pulm consulted. Can assess and see if candidate for talc vs aspira catheter vs await for treatment response from therapy. -- Sating fine on RA    We will follow along with you, thank you for this consult. Discussion: Corrinne Scala is a  80y.o. year old seen in consultation at the request of Dr. Sarita Auguste for stage IV NSCLC admitted with hemoptysis. Mr Kvng Correa was recently dx with metastatic NSCLC EGFR + and has been followed by Dr. Ivone Bateman. He has had 2 thoracentesis that are malignant and has extensive dz. He has not started on therapy yet but Alex Anderson is on it's way to office. He states he started having hemoptysis day of admission on several occasions with clots the size of quarter most times and presented for evaluation.   IN ED CTA ruled out PE, his RUL lung mass has increased slightly from 5x5.5cm to 5x6.4cm, his other dz is stable and he has moderate rt pleural effusion and small L pleural effusion. He has had a cough that occurs frequently numerous times each hour, he hasn't been taking anything for cough at home. Denies fever, chills, or CP. He has had a few loose stools here but normally is constipated. Onc History:  Mr Jaison Naik is an 80 yr old male referred in consultation by Dr. Ritesh Campuzano with lung cancer. He initially presented with concerns of dyspnea on exertion noted when he tried to shovel gravel. He also had hemoptysis. He felt weak and tired. He subsequently developed a fair amount of discomfort in his lower substernal region, right anterolateral rib cage region and left posterior shoulder blade region. His left shoulder has been giving him a fair amount of trouble and has limited range of motion. He also reported having pain in the left knee worse when he walks and feeling as if it is going to give way on him. He noted a 25 pound weight loss over 7 months. CXray 18 reported abnormal density in the medial right hemithorax, suggestive of diaphragmatic hernia. Small right pleural effusion. CT recommended. CT chest on 18 reported a 5.4 x 5 x 5.3 cm RUL mass with collapse of the RUL. Large right pleural effusion and small left pleural effusion. Enlarged mediastinal and hilar LNs. Imagin18 CTA Chest:  1. There is no acute pulmonary embolism.     2. Increase in size of a right upper lobe lung mass. Increased right upper lobe  atelectasis. There is no new lung nodule.     3. Thoracic metastatic lymphadenopathy is again demonstrated.     3. Moderate right and small left pleural effusions.     4. A left pleural nodule measuring 1.2 cm is suspicious for metastatic disease.     5. Scattered bony metastases best seen in the right fifth anteriorly and left  sixth rib posteriorly.       Labs:    Recent Results (from the past 24 hour(s))   CBC WITH AUTOMATED DIFF    Collection Time: 18 10:16 PM   Result Value Ref Range    WBC 5.2 4.1 - 11.1 K/uL    RBC 3.77 (L) 4.10 - 5.70 M/uL    HGB 10.2 (L) 12.1 - 17.0 g/dL    HCT 30.9 (L) 36.6 - 50.3 %    MCV 82.0 80.0 - 99.0 FL    MCH 27.1 26.0 - 34.0 PG    MCHC 33.0 30.0 - 36.5 g/dL    RDW 16.4 (H) 11.5 - 14.5 %    PLATELET 093 915 - 207 K/uL    MPV 10.6 8.9 - 12.9 FL    NRBC 0.0 0  WBC    ABSOLUTE NRBC 0.00 0.00 - 0.01 K/uL    NEUTROPHILS 74 32 - 75 %    LYMPHOCYTES 15 12 - 49 %    MONOCYTES 7 5 - 13 %    EOSINOPHILS 2 0 - 7 %    BASOPHILS 1 0 - 1 %    IMMATURE GRANULOCYTES 1 (H) 0.0 - 0.5 %    ABS. NEUTROPHILS 3.7 1.8 - 8.0 K/UL    ABS. LYMPHOCYTES 0.8 0.8 - 3.5 K/UL    ABS. MONOCYTES 0.4 0.0 - 1.0 K/UL    ABS. EOSINOPHILS 0.1 0.0 - 0.4 K/UL    ABS. BASOPHILS 0.1 0.0 - 0.1 K/UL    ABS. IMM. GRANS. 0.1 (H) 0.00 - 0.04 K/UL    DF SMEAR SCANNED      RBC COMMENTS ANISOCYTOSIS  1+        RBC COMMENTS OVALOCYTES  1+       CK W/ CKMB & INDEX    Collection Time: 08/22/18 10:16 PM   Result Value Ref Range    CK 70 39 - 308 U/L    CK - MB 1.0 <3.6 NG/ML    CK-MB Index 1.4 0 - 2.5     METABOLIC PANEL, COMPREHENSIVE    Collection Time: 08/22/18 10:16 PM   Result Value Ref Range    Sodium 140 136 - 145 mmol/L    Potassium 3.4 (L) 3.5 - 5.1 mmol/L    Chloride 106 97 - 108 mmol/L    CO2 27 21 - 32 mmol/L    Anion gap 7 5 - 15 mmol/L    Glucose 157 (H) 65 - 100 mg/dL    BUN 39 (H) 6 - 20 MG/DL    Creatinine 1.03 0.70 - 1.30 MG/DL    BUN/Creatinine ratio 38 (H) 12 - 20      GFR est AA >60 >60 ml/min/1.73m2    GFR est non-AA >60 >60 ml/min/1.73m2    Calcium 8.9 8.5 - 10.1 MG/DL    Bilirubin, total 0.4 0.2 - 1.0 MG/DL    ALT (SGPT) 14 12 - 78 U/L    AST (SGOT) 26 15 - 37 U/L    Alk.  phosphatase 675 (H) 45 - 117 U/L    Protein, total 6.9 6.4 - 8.2 g/dL    Albumin 3.3 (L) 3.5 - 5.0 g/dL    Globulin 3.6 2.0 - 4.0 g/dL    A-G Ratio 0.9 (L) 1.1 - 2.2     MAGNESIUM    Collection Time: 08/22/18 10:16 PM   Result Value Ref Range    Magnesium 2.0 1.6 - 2.4 mg/dL   PTT    Collection Time: 08/22/18 10:16 PM   Result Value Ref Range    aPTT 27.1 22.1 - 32.0 sec    aPTT, therapeutic range     58.0 - 77.0 SECS   PROTHROMBIN TIME + INR    Collection Time: 08/22/18 10:16 PM   Result Value Ref Range    INR 1.1 0.9 - 1.1      Prothrombin time 11.2 (H) 9.0 - 11.1 sec   TROPONIN I    Collection Time: 08/22/18 10:16 PM   Result Value Ref Range    Troponin-I, Qt. <0.05 <0.05 ng/mL   NT-PRO BNP    Collection Time: 08/22/18 10:16 PM   Result Value Ref Range    NT pro- (H) 0 - 450 PG/ML   TYPE & SCREEN    Collection Time: 08/22/18 10:16 PM   Result Value Ref Range    Crossmatch Expiration 08/25/2018     ABO/Rh(D) AB POSITIVE     Antibody screen NEG    SAMPLES BEING HELD    Collection Time: 08/22/18 10:16 PM   Result Value Ref Range    SAMPLES BEING HELD        Add-on orders for these samples will be processed based on acceptable specimen integrity and analyte stability, which may vary by analyte. COMMENT        Add-on orders for these samples will be processed based on acceptable specimen integrity and analyte stability, which may vary by analyte.    POC CREATININE    Collection Time: 08/22/18 10:18 PM   Result Value Ref Range    Creatinine (POC) 1.0 0.6 - 1.3 mg/dL    GFRAA, POC >60 >60 ml/min/1.73m2    GFRNA, POC >60 >60 ml/min/1.73m2   EKG, 12 LEAD, INITIAL    Collection Time: 08/22/18 10:29 PM   Result Value Ref Range    Ventricular Rate 82 BPM    Atrial Rate 82 BPM    P-R Interval 164 ms    QRS Duration 78 ms    Q-T Interval 380 ms    QTC Calculation (Bezet) 443 ms    Calculated P Axis 54 degrees    Calculated R Axis 90 degrees    Calculated T Axis 21 degrees    Diagnosis       Normal sinus rhythm  Rightward axis  No previous ECGs available  Confirmed by Oz Thakur ([id number]) on 8/23/2018 3:61:73 AM     METABOLIC PANEL, BASIC    Collection Time: 08/23/18  2:08 AM   Result Value Ref Range    Sodium 139 136 - 145 mmol/L    Potassium 3.3 (L) 3.5 - 5.1 mmol/L    Chloride 105 97 - 108 mmol/L    CO2 25 21 - 32 mmol/L    Anion gap 9 5 - 15 mmol/L    Glucose 128 (H) 65 - 100 mg/dL    BUN 34 (H) 6 - 20 MG/DL    Creatinine 0.88 0.70 - 1.30 MG/DL    BUN/Creatinine ratio 39 (H) 12 - 20      GFR est AA >60 >60 ml/min/1.73m2    GFR est non-AA >60 >60 ml/min/1.73m2    Calcium 8.4 (L) 8.5 - 10.1 MG/DL   CBC WITH AUTOMATED DIFF    Collection Time: 08/23/18  2:08 AM   Result Value Ref Range    WBC 4.5 4.1 - 11.1 K/uL    RBC 3.60 (L) 4.10 - 5.70 M/uL    HGB 9.7 (L) 12.1 - 17.0 g/dL    HCT 29.4 (L) 36.6 - 50.3 %    MCV 81.7 80.0 - 99.0 FL    MCH 26.9 26.0 - 34.0 PG    MCHC 33.0 30.0 - 36.5 g/dL    RDW 16.2 (H) 11.5 - 14.5 %    PLATELET 880 430 - 952 K/uL    MPV 10.3 8.9 - 12.9 FL    NRBC 0.0 0  WBC    ABSOLUTE NRBC 0.00 0.00 - 0.01 K/uL    NEUTROPHILS 73 32 - 75 %    LYMPHOCYTES 16 12 - 49 %    MONOCYTES 8 5 - 13 %    EOSINOPHILS 2 0 - 7 %    BASOPHILS 0 0 - 1 %    IMMATURE GRANULOCYTES 1 (H) 0.0 - 0.5 %    ABS. NEUTROPHILS 3.3 1.8 - 8.0 K/UL    ABS. LYMPHOCYTES 0.7 (L) 0.8 - 3.5 K/UL    ABS. MONOCYTES 0.4 0.0 - 1.0 K/UL    ABS. EOSINOPHILS 0.1 0.0 - 0.4 K/UL    ABS. BASOPHILS 0.0 0.0 - 0.1 K/UL    ABS. IMM. GRANS. 0.0 0.00 - 0.04 K/UL    DF AUTOMATED      RBC COMMENTS ANISOCYTOSIS  1+           History:  Past Medical History:   Diagnosis Date    BP (high blood pressure) 4/30/2010    Cancer ([facility] [address]     Lung    Diabetes mellitus, type 2 (HCC)     Elevated PSA 4/30/2010    Elevated PSA     has had 2 biopsies, both were negative for cancer    Gout 4/30/2010      Past Surgical History:   Procedure Laterality Date    CHEST SURGERY PROCEDURE UNLISTED      Previous thoracenteses    HX COLONOSCOPY      HX HERNIA REPAIR        Prior to Admission medications    Medication Sig Start Date End Date Taking? Authorizing Provider   triazolam (HALCION) 0.25 mg tablet Take 1 Tab by mouth nightly as needed.  Max Daily Amount: 0.25 mg. 7/24/18   Reg Rodas MD   ALPRAZolam Dona Purandy) 0.5 mg tablet TAKE ONE TABLET BY MOUTH TWICE DAILY AS NEEDED FOR ANXIETY 7/24/18 Elena Baltazar MD   allopurinol (ZYLOPRIM) 300 mg tablet TAKE ONE TABLET BY MOUTH ONCE DAILY TO PREVENT GOUT 7/24/18   Elena Baltazar MD   albuterol (PROVENTIL HFA, VENTOLIN HFA, PROAIR HFA) 90 mcg/actuation inhaler Take 2 Puffs by inhalation every four (4) hours as needed for Shortness of Breath. 7/9/18   Elena Baltazar MD   atenolol (TENORMIN) 50 mg tablet TAKE 1/2  TABLET BY MOUTH TWICE DAILY FOR BLOOD PRESSURE 5/14/18   Elena Baltazar MD   losartan (COZAAR) 25 mg tablet Take 1 Tab by mouth daily. For blood pressure and kidneys 4/12/18   Elena Baltazar MD   cetirizine (ZYRTEC) 10 mg tablet Take 1 Tab by mouth daily. 4/12/18   Elena Baltazar MD   fluticasone-vilanterol (BREO ELLIPTA) 100-25 mcg/dose inhaler Take 1 Puff by inhalation daily. 4/12/18   Elena Baltazar MD   finasteride (PROSCAR) 5 mg tablet Take 1 Tab by mouth daily. For prostate 2/13/18   Elena Baltazar MD   promethazine-codeine [facility] WITH CODEINE) 6.25-10 mg/5 mL syrup Take 5 mL by mouth four (4) times daily as needed for Cough. Max Daily Amount: 20 mL. 2/2/18   Elias Muniz MD   hydroCHLOROthiazide (HYDRODIURIL) 25 mg tablet TAKE ONE TABLET BY MOUTH ONCE DAILY 10/18/17   Elena Baltazar MD   pravastatin (PRAVACHOL) 20 mg tablet TAKE ONE TABLET BY MOUTH ONCE DAILY AT  NIGHT  FOR  CHOLESTEROL 10/18/17   Elena Baltazar MD   terazosin (HYTRIN) 10 mg capsule TAKE ONE CAPSULE BY MOUTH ONCE DAILY FOR BLOOD PRESSURE AND PROSTATE 10/18/17   Elena Baltazar MD   VIT C/E/ZN/COPPR/LUTEIN/ZEAXAN (PRESERVISION AREDS 2 PO) Take  by mouth daily. Historical Provider   furosemide (LASIX) 40 mg tablet One tab po daily as needed for fluid 5/16/16   Elena Baltazar MD   Cholecalciferol, Vitamin D3, (VITAMIN D3) 1,000 unit Cap Take 1 Tab by mouth daily. Historical Provider   aspirin (ASPIRIN) 325 mg tablet Take 325 mg by mouth daily. Historical Provider   MULTIVITAMINS (MULTIPLE VITAMINS PO) Take 1 Tab by mouth daily. Historical Provider     Allergies   Allergen Reactions    Ciprofloxacin Other (comments)     Nauseated, felt fuzzy    Erythromycin Unknown (comments)    Pcn [Penicillins] Swelling      Social History   Substance Use Topics    Smoking status: Former Smoker     Quit date: 1/1/1965    Smokeless tobacco: Never Used      Comment: 1965    Alcohol use No      Family History   Problem Relation Age of Onset    Diabetes Brother         Current Medications:  Current Facility-Administered Medications   Medication Dose Route Frequency    terazosin (HYTRIN) capsule 10 mg  10 mg Oral QPM    cetirizine (ZYRTEC) tablet 10 mg  10 mg Oral DAILY    pravastatin (PRAVACHOL) tablet 20 mg  20 mg Oral DAILY    losartan (COZAAR) tablet 25 mg  25 mg Oral DAILY    finasteride (PROSCAR) tablet 5 mg  5 mg Oral DAILY    albuterol (PROVENTIL HFA, VENTOLIN HFA, PROAIR HFA) inhaler 2 Puff  2 Puff Inhalation Q4H PRN    fluticasone-vilanterol (BREO ELLIPTA) 100mcg-25mcg/puff  1 Puff Inhalation DAILY    atenolol (TENORMIN) tablet 25 mg  25 mg Oral Q12H    allopurinol (ZYLOPRIM) tablet 300 mg  300 mg Oral DAILY    therapeutic multivitamin (THERAGRAN) tablet 1 Tab  1 Tab Oral DAILY    promethazine-codeine (PHENERGAN with CODEINE) 6.25-10 mg/5 mL syrup 5 mL  5 mL Oral QID PRN    cholecalciferol (VITAMIN D3) tablet 1,000 Units  1,000 Units Oral DAILY    sodium chloride (NS) flush 5-10 mL  5-10 mL IntraVENous Q8H    sodium chloride (NS) flush 5-10 mL  5-10 mL IntraVENous PRN    ondansetron (ZOFRAN) injection 4 mg  4 mg IntraVENous Q4H PRN    bisacodyl (DULCOLAX) tablet 5 mg  5 mg Oral DAILY PRN    furosemide (LASIX) tablet 40 mg  40 mg Oral DAILY    ALPRAZolam (XANAX) tablet 0.25 mg  0.25 mg Oral TID PRN         Review of Systems:12 point obtained and negative other than stated in HPI    Physical Exam:  Constitutional Alert, cooperative, oriented. Appears stated age.    Head Normocephalic; no scars   Eyes Conjunctivae and sclerae are clear and without icterus. Pupils are reactive and equal.   ENMT Sinuses are nontender. No oral exudates, ulcers, masses, thrush or mucositis. Oropharynx clear. Tongue normal.   Neck Supple without masses or thyromegaly. No jugular venous distension. Hematologic/Lymphatic No petechiae or purpura. No tender or palpable lymph nodes in the cervical, supraclavicular, axillary or inguinal area. Respiratory Decreased bs b/l bases with less movement in rt lung fields. Cardiovascular Regular rate and rhythm of heart without murmurs, gallops or rubs. Chest / Line Site Chest is symmetric with no chest wall deformities. Abdomen Non-tender, non-distended, no masses, ascites or hepatosplenomegaly. Good bowel sounds. No guarding or rebound tenderness. No pulsatile masses. Musculoskeletal No tenderness or swelling, normal range of motion without obvious weakness. Extremities No visible deformities, no cyanosis, clubbing or edema. Skin No rashes, scars, or lesions suggestive of malignancy. No petechiae, purpura, or ecchymoses. No excoriations. Neurologic No sensory or motor deficits, normal cerebellar function, normal gait, cranial nerves intact. Psychiatric Alert and oriented times three. Coherent speech. Verbalizes understanding of our discussions today.

## 2018-08-23 NOTE — H&P
Hospitalist Admission Note    NAME: Tashi Jorgensen   :  1933   MRN:  [id number]     Date/Time:  2018 4:46 AM    Patient PCP: Flaca Reno MD  ______________________________________________________________________  Given the patient's current clinical presentation, I have a high level of concern for decompensation if discharged from the emergency department. Complex decision making was performed, which includes reviewing the patient's available past medical records, laboratory results, and x-ray films. My assessment of this patient's clinical condition and my plan of care is as follows. Assessment / Plan:    Principal Problem:    Hemoptysis likely secondary to lung CA   -discontinued ASA   -close motoring   -Oncology and pulmonary consult at AM         Active Problems:    Essential hypertension   -continue home medication atenolol and Cozaar      Gout   -continue allopurinol       Malignant neoplasm of upper lobe of left lung   -Oncology consult at AM   -consider palliative care consult       Anemia acute on chronic likely secondary to LUNG CA and blood loss secondary to hemoptysis   monitor CBC and transfuse PRN     DM   -Carb consistent diet           Code Status: full code   Surrogate Decision Maker:wife     DVT Prophylaxis: no heparin because of hemoptysis   GI Prophylaxis: not indicated          Subjective:   CHIEF COMPLAINT: hemoptysis     HISTORY OF PRESENT ILLNESS:     Tashi Jorgensen is a 80 y.o.    male who presents with PMHx significant for DM / gout , HTN , Lung ca presented to ED because of worsening hemoptysis , patient stated his hemoptysis worsening today , his hemoptysis started few months ago associated with worsening SOB , patient was recently diagnosed with lung CA  About 2 months ago and superposed to be started on chemotherapy 2 weeks ago and chemo was delay because of insurance , CTA chest was done and show NO PE and show Increase in size of a right upper lobe lung mass. Increased right upper lobe atelectasis , patient denies any fever any chills  . We were asked to admit for work up and evaluation of the above problems. Past Medical History:   Diagnosis Date    BP (high blood pressure) 4/30/2010    Cancer ([address]     Lung    Diabetes mellitus, type 2 (HCC)     Elevated PSA 4/30/2010    Elevated PSA     has had 2 biopsies, both were negative for cancer    Gout 4/30/2010        Past Surgical History:   Procedure Laterality Date    CHEST SURGERY PROCEDURE UNLISTED      Previous thoracenteses    HX COLONOSCOPY      HX HERNIA REPAIR         Social History   Substance Use Topics    Smoking status: Former Smoker     Quit date: 1/1/1965    Smokeless tobacco: Never Used      Comment: 1965    Alcohol use No        Family History   Problem Relation Age of Onset    Diabetes Brother      Allergies   Allergen Reactions    Ciprofloxacin Other (comments)     Nauseated, felt fuzzy    Erythromycin Unknown (comments)    Pcn [Penicillins] Swelling        Prior to Admission medications    Medication Sig Start Date End Date Taking? Authorizing Provider   triazolam (HALCION) 0.25 mg tablet Take 1 Tab by mouth nightly as needed. Max Daily Amount: 0.25 mg. 7/24/18   Alley Truong MD   ALPRAZolam Heather Ar) 0.5 mg tablet TAKE ONE TABLET BY MOUTH TWICE DAILY AS NEEDED FOR ANXIETY 7/24/18   Alley Truong MD   allopurinol (ZYLOPRIM) 300 mg tablet TAKE ONE TABLET BY MOUTH ONCE DAILY TO PREVENT GOUT 7/24/18   Alley Truong MD   albuterol (PROVENTIL HFA, VENTOLIN HFA, PROAIR HFA) 90 mcg/actuation inhaler Take 2 Puffs by inhalation every four (4) hours as needed for Shortness of Breath. 7/9/18   Alley Truong MD   atenolol (TENORMIN) 50 mg tablet TAKE 1/2  TABLET BY MOUTH TWICE DAILY FOR BLOOD PRESSURE 5/14/18   Alley Truong MD   losartan (COZAAR) 25 mg tablet Take 1 Tab by mouth daily.  For blood pressure and kidneys 4/12/18   Olivia Chavez Martina Sauer MD   cetirizine (ZYRTEC) 10 mg tablet Take 1 Tab by mouth daily. 4/12/18   Boogie Ba MD   fluticasone-vilanterol (BREO ELLIPTA) 100-25 mcg/dose inhaler Take 1 Puff by inhalation daily. 4/12/18   Boogie Ba MD   finasteride (PROSCAR) 5 mg tablet Take 1 Tab by mouth daily. For prostate 2/13/18   Boogie Ba MD   promethazine-[facility] WITH CODEINE) 6.25-10 mg/5 mL syrup Take 5 mL by mouth four (4) times daily as needed for Cough. Max Daily Amount: 20 mL. 2/2/18   Liam De Jesus MD   hydroCHLOROthiazide (HYDRODIURIL) 25 mg tablet TAKE ONE TABLET BY MOUTH ONCE DAILY 10/18/17   Boogie Ba MD   pravastatin (PRAVACHOL) 20 mg tablet TAKE ONE TABLET BY MOUTH ONCE DAILY AT  NIGHT  FOR  CHOLESTEROL 10/18/17   Boogie Ba MD   terazosin (HYTRIN) 10 mg capsule TAKE ONE CAPSULE BY MOUTH ONCE DAILY FOR BLOOD PRESSURE AND PROSTATE 10/18/17   Boogie Ba MD   VIT C/E/ZN/COPPR/LUTEIN/ZEAXAN (PRESERVISION AREDS 2 PO) Take  by mouth daily. Historical Provider   diphenoxylate-atropine (LOMOTIL) 2.5-0.025 mg per tablet Take 1 Tab by mouth four (4) times daily as needed for Diarrhea. Max Daily Amount: 4 Tabs. 12/23/16   Boogie Ba MD   furosemide (LASIX) 40 mg tablet One tab po daily as needed for fluid 5/16/16   Boogie Ba MD   Cholecalciferol, Vitamin D3, (VITAMIN D3) 1,000 unit Cap Take 1 Tab by mouth daily. Historical Provider   aspirin (ASPIRIN) 325 mg tablet Take 325 mg by mouth daily. Historical Provider   MULTIVITAMINS (MULTIPLE VITAMINS PO) Take 1 Tab by mouth daily. Historical Provider       REVIEW OF SYSTEMS:     I am not able to complete the review of systems because:    The patient is intubated and sedated    The patient has altered mental status due to his acute medical problems    The patient has baseline aphasia from prior stroke(s)    The patient has baseline dementia and is not reliable historian    The patient is in acute medical distress and unable to provide information           Total of 12 systems reviewed as follows:       POSITIVE= underlined text  Negative = text not underlined  General:  fever, chills, sweats, generalized weakness, weight loss/gain,      loss of appetite   Eyes:    blurred vision, eye pain, loss of vision, double vision  ENT:    rhinorrhea, pharyngitis   Respiratory:   cough, sputum production, SOB, CARTAGENA, wheezing, pleuritic pain   Cardiology:   chest pain, palpitations, orthopnea, PND, edema, syncope   Gastrointestinal:  abdominal pain , N/V, diarrhea, dysphagia, constipation, bleeding   Genitourinary:  frequency, urgency, dysuria, hematuria, incontinence   Muskuloskeletal :  arthralgia, myalgia, back pain  Hematology:  easy bruising, nose or gum bleeding, lymphadenopathy   Dermatological: rash, ulceration, pruritis, color change / jaundice  Endocrine:   hot flashes or polydipsia   Neurological:  headache, dizziness, confusion, focal weakness, paresthesia,     Speech difficulties, memory loss, gait difficulty  Psychological: Feelings of anxiety, depression, agitation    Objective:   VITALS:    Visit Vitals    /73    Pulse 73    Temp 97.4 °F (36.3 °C)    Resp 20    Ht 5' 7\" (1.702 m)    Wt 69.3 kg (152 lb 12.5 oz)    SpO2 95%    BMI 23.93 kg/m2       PHYSICAL EXAM:    General:    Alert, cooperative, no distress, appears stated age. HEENT: Atraumatic, anicteric sclerae, pink conjunctivae     No oral ulcers, mucosa moist, throat clear, dentition fair  Neck:  Supple, symmetrical,  thyroid: non tender  Lungs:   diminished breath sound at bases b/l . No Wheezing or Rhonchi. No rales. Chest wall:  No tenderness  No Accessory muscle use. Heart:   Regular  rhythm,  No  murmur   No edema  Abdomen:   Soft, non-tender. Not distended. Bowel sounds normal  Extremities: No cyanosis. No clubbing,      Skin turgor normal, Capillary refill normal, Radial dial pulse 2+  Skin:     Not pale.   Not Jaundiced  No rashes Psych:  Good insight. Not depressed. Not anxious or agitated. Neurologic: EOMs intact. No facial asymmetry. No aphasia or slurred speech. Symmetrical strength, Sensation grossly intact. Alert and oriented X 4.     _______________________________________________________________________  Care Plan discussed with:    Comments   Patient y    Family  y    RN     Care Manager                    Consultant:      _______________________________________________________________________  Expected  Disposition:   Home with Family y   HH/PT/OT/RN    SNF/LTC    RAMON    ________________________________________________________________________  TOTAL TIME:  39 Minutes    Critical Care Provided     Minutes non procedure based      Comments    y Reviewed previous records   >50% of visit spent in counseling and coordination of care y Discussion with patient and/or family and questions answered       ________________________________________________________________________  Signed: Shazia Maciel MD    Procedures: see electronic medical records for all procedures/Xrays and details which were not copied into this note but were reviewed prior to creation of Plan. LAB DATA REVIEWED:    Recent Results (from the past 24 hour(s))   CBC WITH AUTOMATED DIFF    Collection Time: 08/22/18 10:16 PM   Result Value Ref Range    WBC 5.2 4.1 - 11.1 K/uL    RBC 3.77 (L) 4.10 - 5.70 M/uL    HGB 10.2 (L) 12.1 - 17.0 g/dL    HCT 30.9 (L) 36.6 - 50.3 %    MCV 82.0 80.0 - 99.0 FL    MCH 27.1 26.0 - 34.0 PG    MCHC 33.0 30.0 - 36.5 g/dL    RDW 16.4 (H) 11.5 - 14.5 %    PLATELET 632 226 - 157 K/uL    MPV 10.6 8.9 - 12.9 FL    NRBC 0.0 0  WBC    ABSOLUTE NRBC 0.00 0.00 - 0.01 K/uL    NEUTROPHILS 74 32 - 75 %    LYMPHOCYTES 15 12 - 49 %    MONOCYTES 7 5 - 13 %    EOSINOPHILS 2 0 - 7 %    BASOPHILS 1 0 - 1 %    IMMATURE GRANULOCYTES 1 (H) 0.0 - 0.5 %    ABS. NEUTROPHILS 3.7 1.8 - 8.0 K/UL    ABS. LYMPHOCYTES 0.8 0.8 - 3.5 K/UL    ABS.  MONOCYTES 0.4 0.0 - 1.0 K/UL    ABS. EOSINOPHILS 0.1 0.0 - 0.4 K/UL    ABS. BASOPHILS 0.1 0.0 - 0.1 K/UL    ABS. IMM. GRANS. 0.1 (H) 0.00 - 0.04 K/UL    DF SMEAR SCANNED      RBC COMMENTS ANISOCYTOSIS  1+        RBC COMMENTS OVALOCYTES  1+       CK W/ CKMB & INDEX    Collection Time: 08/22/18 10:16 PM   Result Value Ref Range    CK 70 39 - 308 U/L    CK - MB 1.0 <3.6 NG/ML    CK-MB Index 1.4 0 - 2.5     METABOLIC PANEL, COMPREHENSIVE    Collection Time: 08/22/18 10:16 PM   Result Value Ref Range    Sodium 140 136 - 145 mmol/L    Potassium 3.4 (L) 3.5 - 5.1 mmol/L    Chloride 106 97 - 108 mmol/L    CO2 27 21 - 32 mmol/L    Anion gap 7 5 - 15 mmol/L    Glucose 157 (H) 65 - 100 mg/dL    BUN 39 (H) 6 - 20 MG/DL    Creatinine 1.03 0.70 - 1.30 MG/DL    BUN/Creatinine ratio 38 (H) 12 - 20      GFR est AA >60 >60 ml/min/1.73m2    GFR est non-AA >60 >60 ml/min/1.73m2    Calcium 8.9 8.5 - 10.1 MG/DL    Bilirubin, total 0.4 0.2 - 1.0 MG/DL    ALT (SGPT) 14 12 - 78 U/L    AST (SGOT) 26 15 - 37 U/L    Alk.  phosphatase 675 (H) 45 - 117 U/L    Protein, total 6.9 6.4 - 8.2 g/dL    Albumin 3.3 (L) 3.5 - 5.0 g/dL    Globulin 3.6 2.0 - 4.0 g/dL    A-G Ratio 0.9 (L) 1.1 - 2.2     MAGNESIUM    Collection Time: 08/22/18 10:16 PM   Result Value Ref Range    Magnesium 2.0 1.6 - 2.4 mg/dL   PTT    Collection Time: 08/22/18 10:16 PM   Result Value Ref Range    aPTT 27.1 22.1 - 32.0 sec    aPTT, therapeutic range     58.0 - 77.0 SECS   PROTHROMBIN TIME + INR    Collection Time: 08/22/18 10:16 PM   Result Value Ref Range    INR 1.1 0.9 - 1.1      Prothrombin time 11.2 (H) 9.0 - 11.1 sec   TROPONIN I    Collection Time: 08/22/18 10:16 PM   Result Value Ref Range    Troponin-I, Qt. <0.05 <0.05 ng/mL   NT-PRO BNP    Collection Time: 08/22/18 10:16 PM   Result Value Ref Range    NT pro- (H) 0 - 450 PG/ML   TYPE & SCREEN    Collection Time: 08/22/18 10:16 PM   Result Value Ref Range    Crossmatch Expiration 08/25/2018     ABO/Rh(D) AB POSITIVE Antibody screen NEG    SAMPLES BEING HELD    Collection Time: 08/22/18 10:16 PM   Result Value Ref Range    SAMPLES BEING HELD        Add-on orders for these samples will be processed based on acceptable specimen integrity and analyte stability, which may vary by analyte. COMMENT        Add-on orders for these samples will be processed based on acceptable specimen integrity and analyte stability, which may vary by analyte.    POC CREATININE    Collection Time: 08/22/18 10:18 PM   Result Value Ref Range    Creatinine (POC) 1.0 0.6 - 1.3 mg/dL    GFRAA, POC >60 >60 ml/min/1.73m2    GFRNA, POC >60 >60 ml/min/1.73m2   EKG, 12 LEAD, INITIAL    Collection Time: 08/22/18 10:29 PM   Result Value Ref Range    Ventricular Rate 82 BPM    Atrial Rate 82 BPM    P-R Interval 164 ms    QRS Duration 78 ms    Q-T Interval 380 ms    QTC Calculation (Bezet) 443 ms    Calculated P Axis 54 degrees    Calculated R Axis 90 degrees    Calculated T Axis 21 degrees    Diagnosis       Normal sinus rhythm  Rightward axis  No previous ECGs available     METABOLIC PANEL, BASIC    Collection Time: 08/23/18  2:08 AM   Result Value Ref Range    Sodium 139 136 - 145 mmol/L    Potassium 3.3 (L) 3.5 - 5.1 mmol/L    Chloride 105 97 - 108 mmol/L    CO2 25 21 - 32 mmol/L    Anion gap 9 5 - 15 mmol/L    Glucose 128 (H) 65 - 100 mg/dL    BUN 34 (H) 6 - 20 MG/DL    Creatinine 0.88 0.70 - 1.30 MG/DL    BUN/Creatinine ratio 39 (H) 12 - 20      GFR est AA >60 >60 ml/min/1.73m2    GFR est non-AA >60 >60 ml/min/1.73m2    Calcium 8.4 (L) 8.5 - 10.1 MG/DL   CBC WITH AUTOMATED DIFF    Collection Time: 08/23/18  2:08 AM   Result Value Ref Range    WBC 4.5 4.1 - 11.1 K/uL    RBC 3.60 (L) 4.10 - 5.70 M/uL    HGB 9.7 (L) 12.1 - 17.0 g/dL    HCT 29.4 (L) 36.6 - 50.3 %    MCV 81.7 80.0 - 99.0 FL    MCH 26.9 26.0 - 34.0 PG    MCHC 33.0 30.0 - 36.5 g/dL    RDW 16.2 (H) 11.5 - 14.5 %    PLATELET 081 719 - 815 K/uL    MPV 10.3 8.9 - 12.9 FL    NRBC 0.0 0  WBC ABSOLUTE NRBC 0.00 0.00 - 0.01 K/uL    NEUTROPHILS 73 32 - 75 %    LYMPHOCYTES 16 12 - 49 %    MONOCYTES 8 5 - 13 %    EOSINOPHILS 2 0 - 7 %    BASOPHILS 0 0 - 1 %    IMMATURE GRANULOCYTES 1 (H) 0.0 - 0.5 %    ABS. NEUTROPHILS 3.3 1.8 - 8.0 K/UL    ABS. LYMPHOCYTES 0.7 (L) 0.8 - 3.5 K/UL    ABS. MONOCYTES 0.4 0.0 - 1.0 K/UL    ABS. EOSINOPHILS 0.1 0.0 - 0.4 K/UL    ABS. BASOPHILS 0.0 0.0 - 0.1 K/UL    ABS. IMM.  GRANS. 0.0 0.00 - 0.04 K/UL    DF AUTOMATED      RBC COMMENTS ANISOCYTOSIS  1+

## 2018-08-23 NOTE — CDMP QUERY
Account Number: [de-identified]  MRN: [id number]  Patient: Trinity Downey   Created: [id number]:80:10  Clinician Name: Cy Hyman RN, CCDS   Dr. 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 Risk Factors: metastatic lung CA  Clinical Indicators:  SOB, RR 16-23, bilateral pleural effusion, CXR  . Moderate right pleural effusion, trace L  pleural effusion. Treatment: thoracentesis, possible pleurodesis,  CT insertion. Please clarify and document your clinical opinion in the progress notes and discharge summary including the definitive and/or presumptive diagnosis, (suspected or probable), related to the above clinical findings. Please include clinical findings supporting your diagnosis.   Thank Gloria Bradford RN, CCDS

## 2018-08-23 NOTE — PROGRESS NOTES
PCU SHIFT NURSING NOTE      Bedside and Verbal shift change report given to Sven Rodgers RN (oncoming nurse) by Marbella Schwartz ER (offgoing nurse). Report included the following information SBAR, Kardex, ED Summary, Intake/Output, MAR, Recent Results and Cardiac Rhythm NSR. Shift Summary: Received pt per stretcher from ER and pt ambulated to Bed 2250 with no noted distress. See flowcharting for full assessment. Wife at bedside. instr on call bell, urinal for accurate I&O, preference meal menu and phone number, wipes to sterilize hands. Will monitor. Admission Date 8/22/2018   Admission Diagnosis Hemoptysis  Hemoptysis   Consults IP CONSULT TO HOSPITALIST        Consults   []PT   []OT   []Speech   []Case Management      [] Palliative      Cardiac Monitoring Order   []Yes   []No     IV drips   []Yes    Drip:                            Dose:  Drip:                            Dose:  Drip:                            Dose:   []No     GI Prophylaxis   []Yes   []No         DVT Prophylaxis   SCDs:  Sequential Compression Device: Bilateral     Patient Refused VTE Prophylaxis: Yes    Dusty stockings:         [] Medication   []Contraindicated   []None      Activity Level Activity Level: Up ad mariana     Activity Assistance: No assistance needed   Purposeful Rounding every 1-2 hour? []Yes   Vásquez Score  Total Score: 1   Bed Alarm (If score 3 or >)   []Yes   [] Refused (See signed refusal form in chart)   Michael Score  Michael Score: 23   Michael Score (if score 14 or less)   []PMT consult   []Wound Care consult      []Specialty bed   [] Nutrition consult          Needs prior to discharge:   Home O2 required:    []Yes   []No    If yes, how much O2 required?     Other:    Last Bowel Movement: Last Bowel Movement Date: 08/20/18      Influenza Vaccine          Pneumonia Vaccine           Diet Active Orders   Diet    DIET CARDIAC Regular      LDAs               Peripheral IV 08/22/18 Right Antecubital (Active)   Site Assessment Clean, dry, & intact 8/23/2018  2:56 AM   Phlebitis Assessment 0 8/23/2018  2:56 AM   Infiltration Assessment 0 8/23/2018  2:56 AM   Dressing Status Clean, dry, & intact 8/23/2018  2:56 AM   Dressing Type Transparent 8/23/2018  2:56 AM   Hub Color/Line Status Pink;Flushed 8/23/2018  2:56 AM   Action Taken Blood drawn 8/22/2018 10:41 PM   Alcohol Cap Used Yes 8/23/2018  2:56 AM                      Urinary Catheter      Intake & Output        Readmission Risk Assessment Tool Score High Risk            24       Total Score        2 . Living with Significant Other. Assisted Living. LTAC. SNF. or   Rehab    5 Pt.  Coverage (Medicare=5 , Medicaid, or Self-Pay=4)    17 Charlson Comorbidity Score (Age + Comorbid Conditions)        Criteria that do not apply:    Has Seen PCP in Last 6 Months (Yes=3, No=0)    Patient Length of Stay (>5 days = 3)    IP Visits Last 12 Months (1-3=4, 4=9, >4=11)       Expected Length of Stay - - -   Actual Length of Stay 0

## 2018-08-24 VITALS
WEIGHT: 152.78 LBS | HEIGHT: 67 IN | TEMPERATURE: 97.7 F | HEART RATE: 74 BPM | BODY MASS INDEX: 23.98 KG/M2 | DIASTOLIC BLOOD PRESSURE: 56 MMHG | OXYGEN SATURATION: 95 % | RESPIRATION RATE: 18 BRPM | SYSTOLIC BLOOD PRESSURE: 116 MMHG

## 2018-08-24 PROCEDURE — 74011250637 HC RX REV CODE- 250/637: Performed by: INTERNAL MEDICINE

## 2018-08-24 PROCEDURE — 74011250637 HC RX REV CODE- 250/637: Performed by: FAMILY MEDICINE

## 2018-08-24 RX ORDER — HYDROCHLOROTHIAZIDE 25 MG/1
12.5 TABLET ORAL DAILY
Status: DISCONTINUED | OUTPATIENT
Start: 2018-08-24 | End: 2018-08-24 | Stop reason: HOSPADM

## 2018-08-24 RX ORDER — PROMETHAZINE HYDROCHLORIDE AND DEXTROMETHORPHAN HYDROBROMIDE 6.25; 15 MG/5ML; MG/5ML
5 SYRUP ORAL
Qty: 240 ML | Refills: 2 | Status: SHIPPED | OUTPATIENT
Start: 2018-08-24 | End: 2018-12-04 | Stop reason: ALTCHOICE

## 2018-08-24 RX ORDER — DOXYCYCLINE 100 MG/1
100 CAPSULE ORAL 2 TIMES DAILY
Qty: 14 CAP | Refills: 0 | Status: SHIPPED | OUTPATIENT
Start: 2018-08-24 | End: 2018-08-31

## 2018-08-24 RX ORDER — HYDROCODONE POLISTIREX AND CHLORPHENIRAMINE POLISTIREX 10; 8 MG/5ML; MG/5ML
5 SUSPENSION, EXTENDED RELEASE ORAL
Status: DISCONTINUED | OUTPATIENT
Start: 2018-08-24 | End: 2018-08-24 | Stop reason: HOSPADM

## 2018-08-24 RX ADMIN — FINASTERIDE 5 MG: 5 TABLET, FILM COATED ORAL at 09:35

## 2018-08-24 RX ADMIN — Medication 10 ML: at 05:03

## 2018-08-24 RX ADMIN — Medication 10 ML: at 14:23

## 2018-08-24 RX ADMIN — PRAVASTATIN SODIUM 20 MG: 10 TABLET ORAL at 09:35

## 2018-08-24 RX ADMIN — ALLOPURINOL 300 MG: 300 TABLET ORAL at 09:35

## 2018-08-24 RX ADMIN — FLUTICASONE FUROATE AND VILANTEROL TRIFENATATE 1 PUFF: 100; 25 POWDER RESPIRATORY (INHALATION) at 09:37

## 2018-08-24 RX ADMIN — VITAMIN D, TAB 1000IU (100/BT) 1000 UNITS: 25 TAB at 09:35

## 2018-08-24 RX ADMIN — LOSARTAN POTASSIUM 25 MG: 25 TABLET ORAL at 09:35

## 2018-08-24 RX ADMIN — ATENOLOL 25 MG: 25 TABLET ORAL at 09:35

## 2018-08-24 RX ADMIN — THERA TABS 1 TABLET: TAB at 09:37

## 2018-08-24 RX ADMIN — HYDROCHLOROTHIAZIDE 12.5 MG: 25 TABLET ORAL at 14:18

## 2018-08-24 NOTE — PROGRESS NOTES
PCU SHIFT NURSING NOTE      Bedside and Verbal shift change report given to Mara Kent RN (oncoming nurse) by Sharri Reeves RN (offgoing nurse). Report included the following information SBAR, Intake/Output and Recent Results. Shift Summary:   Received report and assumed care of patient. BP 98/81 (BP 1 Location: Left arm, BP Patient Position: At rest;Sitting)  Pulse 81  Temp 97.3 °F (36.3 °C)  Resp 20  Ht 5' 7\" (1.702 m)  Wt 69.3 kg (152 lb 12.5 oz)  SpO2 95%  BMI 23.93 kg/m2    1315 Called Dr. Tori Camejo about patient refusing furosemide. Patient prefers HCTZ. Dr. Tori Camejo ordered 12.5mg HCTZ PO. Discussed with Dr. Tori Camejo possibility of patient discharge. He wants patient to see Dr. Wil Post [facility] regarding radiation and will coordinate planning at that point. /56  Pulse 74  Temp 97.7 °F (36.5 °C)  Resp 18  Ht 5' 7\" (1.702 m)  Wt 69.3 kg (152 lb 12.5 oz)  SpO2 95%  BMI 23.93 kg/m2    1530 Dr. De La Torre  came to see patient. Discharge order has been put in by Dr. Tori Camejo. Two prescriptions sent to [facility] by Dr. Tori Camejo so no hard prescriptions going home with patient. Removed patient monitors and IV. Reviewed discharge instructions with patient and he signed a copy for our records. Patient left [facility] [phone] escorted in a wheelchair by a PCT-wife drove him home.      Admission Date 8/22/2018   Admission Diagnosis Hemoptysis  Hemoptysis   Consults IP CONSULT TO HOSPITALIST  IP CONSULT TO ONCOLOGY  IP CONSULT TO PRIMARY CARE PROVIDER        Consults   []PT   []OT   []Speech   [x]Case Management      [] Palliative      Cardiac Monitoring Order   [x]Yes   []No     IV drips   []Yes    Drip:                            Dose:  Drip:                            Dose:  Drip:                            Dose:   [x]No     GI Prophylaxis   []Yes   [x]No         DVT Prophylaxis   SCDs:  Sequential Compression Device: Bilateral     Patient Refused VTE Prophylaxis: Yes    Dusty stockings:         [] Medication   []Contraindicated   []None      Activity Level Activity Level: Up ad mariana     Activity Assistance: No assistance needed   Purposeful Rounding every 1-2 hour? [x]Yes   Vásquez Score  Total Score: 1   Bed Alarm (If score 3 or >)   []Yes   [] Refused (See signed refusal form in chart)   Michael Score  Michael Score: 23   Michael Score (if score 14 or less)   []PMT consult   []Wound Care consult      []Specialty bed   [] Nutrition consult          Needs prior to discharge:   Home O2 required:    []Yes   [x]No    If yes, how much O2 required? Other:    Last Bowel Movement: Last Bowel Movement Date: 08/23/18      Influenza Vaccine Received Flu Vaccine for Current Season (usually Sept-March): Not Flu Season        Pneumonia Vaccine           Diet Active Orders   Diet    DIET CARDIAC Regular; Consistent Carb 1800kcal      LDAs               Peripheral IV 08/22/18 Right Antecubital (Active)   Site Assessment Clean, dry, & intact 8/24/2018  3:00 AM   Phlebitis Assessment 0 8/24/2018  3:00 AM   Infiltration Assessment 0 8/24/2018  3:00 AM   Dressing Status Clean, dry, & intact 8/24/2018  3:00 AM   Dressing Type Transparent 8/23/2018  7:37 PM   Hub Color/Line Status Pink;Flushed 8/23/2018  7:37 PM   Action Taken Open ports on tubing capped 8/23/2018  3:58 PM   Alcohol Cap Used Yes 8/23/2018  7:37 PM                      Urinary Catheter      Intake & Output   Date 08/23/18 0700 - 08/24/18 0659 08/24/18 0700 - 08/25/18 0659   Shift 0700-1859 1900-0659 24 Hour Total 0700-1859 1900-0659 24 Hour Total   I  N  T  A  K  E   P.O. 240  240         P. O. 240  240       Shift Total  (mL/kg) 240  (3.5)  240  (3.5)      O  U  T  P  U  T   Urine  (mL/kg/hr) 100  (0.1)  100  (0.1)         Urine Voided 100  100         Urine Occurrence(s)  2 x 2 x       Shift Total  (mL/kg) 100  (1.4)  100  (1.4)        140      Weight (kg) 69.3 69.3 69.3 69.3 69.3 69.3         Readmission Risk Assessment Tool Score High Risk            25 Total Score        3 Has Seen PCP in Last 6 Months (Yes=3, No=0)    5 Pt. Coverage (Medicare=5 , Medicaid, or Self-Pay=4)    17 Charlson Comorbidity Score (Age + Comorbid Conditions)        Criteria that do not apply:    . Living with Significant Other. Assisted Living. LTAC. SNF.  or   Rehab    Patient Length of Stay (>5 days = 3)    IP Visits Last 12 Months (1-3=4, 4=9, >4=11)       Expected Length of Stay 2d 4h   Actual Length of Stay 1

## 2018-08-24 NOTE — PROGRESS NOTES
PCU SHIFT NURSING NOTE      Bedside and Verbal shift change report given to Isela Graves RN (oncoming nurse) by Erica Pierce 44 (offgoing nurse). Report included the following information SBAR, Kardex, Intake/Output, MAR, Recent Results and Cardiac Rhythm NSR. Shift Summary: Received pt sitting in recliner as this is most comfortable for him than bed per pt. Wife at bedside with plans to stay the night. No noted distress. See flowcharting for full assessment. Reported that pt had bloody sputum this am but none rest of the day and none present now. Will monitor. Admission Date 8/22/2018   Admission Diagnosis Hemoptysis  Hemoptysis   Consults IP CONSULT TO HOSPITALIST  IP CONSULT TO ONCOLOGY  IP CONSULT TO PRIMARY CARE PROVIDER        Consults   []PT   []OT   []Speech   []Case Management      [] Palliative      Cardiac Monitoring Order   []Yes   []No     IV drips   []Yes    Drip:                            Dose:  Drip:                            Dose:  Drip:                            Dose:   []No     GI Prophylaxis   []Yes   []No         DVT Prophylaxis   SCDs:  Sequential Compression Device: Bilateral     Patient Refused VTE Prophylaxis: Yes    Dusty stockings:         [] Medication   []Contraindicated   []None      Activity Level Activity Level: Up ad mariana     Activity Assistance: No assistance needed   Purposeful Rounding every 1-2 hour? []Yes   Vásquez Score  Total Score: 1   Bed Alarm (If score 3 or >)   []Yes   [] Refused (See signed refusal form in chart)   Michael Score  Michael Score: 23   Michael Score (if score 14 or less)   []PMT consult   []Wound Care consult      []Specialty bed   [] Nutrition consult          Needs prior to discharge:   Home O2 required:    []Yes   []No    If yes, how much O2 required?     Other:    Last Bowel Movement: Last Bowel Movement Date: 08/23/18      Influenza Vaccine Received Flu Vaccine for Current Season (usually Sept-March): Not Flu Season        Pneumonia Vaccine Diet Active Orders   Diet    DIET CARDIAC Regular; Consistent Carb 1800kcal      LDAs               Peripheral IV 08/22/18 Right Antecubital (Active)   Site Assessment Clean, dry, & intact 8/23/2018  7:37 PM   Phlebitis Assessment 0 8/23/2018  7:37 PM   Infiltration Assessment 0 8/23/2018  7:37 PM   Dressing Status Clean, dry, & intact 8/23/2018  7:37 PM   Dressing Type Transparent 8/23/2018  7:37 PM   Hub Color/Line Status Pink;Flushed 8/23/2018  7:37 PM   Action Taken Open ports on tubing capped 8/23/2018  3:58 PM   Alcohol Cap Used Yes 8/23/2018  7:37 PM                      Urinary Catheter      Intake & Output   Date 08/22/18 1900 - 08/23/18 0659 08/23/18 0700 - 08/24/18 0659   Shift 4252-2876 24 Hour Total 2932-1325 5919-7080 24 Hour Total   I  N  T  A  K  E   P.O.   240  240      P. O.   240  240    Shift Total  (mL/kg)   240  (3.5)  240  (3.5)   O  U  T  P  U  T   Urine  (mL/kg/hr) 350 350 100  (0.1)  100      Urine Voided 350 350 100  100    Shift Total  (mL/kg) 350  (5.1) 350  (5.1) 100  (1.4)  100  (1.4)   NET -350 -350 140  140   Weight (kg) 69.3 69.3 69.3 69.3 69.3         Readmission Risk Assessment Tool Score High Risk            25       Total Score        3 Has Seen PCP in Last 6 Months (Yes=3, No=0)    5 Pt. Coverage (Medicare=5 , Medicaid, or Self-Pay=4)    17 Charlson Comorbidity Score (Age + Comorbid Conditions)        Criteria that do not apply:    . Living with Significant Other. Assisted Living. LTAC. SNF.  or   Rehab    Patient Length of Stay (>5 days = 3)    IP Visits Last 12 Months (1-3=4, 4=9, >4=11)       Expected Length of Stay - - -   Actual Length of Stay 0

## 2018-08-24 NOTE — CONSULTS
[facility]  Pulmonary, Critical Care, and Sleep Medicine    Initial Patient Consult    Name: Tanya Galeana MRN: [id number]   : 1933 Hospital: [place] [address]   Date: 2018        IMPRESSION:   · Hemoptysis  · End stage lung cancer  · Malignant right pleural effusion      RECOMMENDATIONS:   · No significant hemoptysis  · May consider pluerx/aspira catheter  · Awaiting to start oral chemo? ?  · Needs palliative care and hospice evaluation  · Home??     Subjective: This patient has been seen and evaluated at the request of Dr. Reilly Jackson for hemoptysis. Patient is a 80 y.o. male dx'd with stage 4 lung cancer, not on any rx  Admitted with mild hemoptysis  Today in no distress  On RA with excellent sats      Past Medical History:   Diagnosis Date    BP (high blood pressure) 2010    Cancer ([address]     Lung    Diabetes mellitus, type 2 (HCC)     Elevated PSA 2010    Elevated PSA     has had 2 biopsies, both were negative for cancer    Gout 2010      Past Surgical History:   Procedure Laterality Date    CHEST SURGERY PROCEDURE UNLISTED      Previous thoracenteses    HX COLONOSCOPY      HX HERNIA REPAIR        Prior to Admission medications    Medication Sig Start Date End Date Taking? Authorizing Provider   losartan (COZAAR) 25 mg tablet Take 1 Tab by mouth daily. For blood pressure and kidneys 18   Jatinder Wetzel MD   triazolam (HALCION) 0.25 mg tablet Take 1 Tab by mouth nightly as needed.  Max Daily Amount: 0.25 mg. 18   Jatinder Wetzel MD   ALPRAZolam Dereje Re) 0.5 mg tablet TAKE ONE TABLET BY MOUTH TWICE DAILY AS NEEDED FOR ANXIETY 18   Jatinder Wetzel MD   allopurinol (ZYLOPRIM) 300 mg tablet TAKE ONE TABLET BY MOUTH ONCE DAILY TO PREVENT GOUT 18   Jatinder Wetzel MD   albuterol (PROVENTIL HFA, VENTOLIN HFA, PROAIR HFA) 90 mcg/actuation inhaler Take 2 Puffs by inhalation every four (4) hours as needed for Shortness of Breath. 7/9/18   German Luciano MD   atenolol (TENORMIN) 50 mg tablet TAKE 1/2  TABLET BY MOUTH TWICE DAILY FOR BLOOD PRESSURE 5/14/18   German Luciano MD   cetirizine (ZYRTEC) 10 mg tablet Take 1 Tab by mouth daily. 4/12/18   German Luciano MD   fluticasone-vilanterol (BREO ELLIPTA) 100-25 mcg/dose inhaler Take 1 Puff by inhalation daily. 4/12/18   German Luciano MD   finasteride (PROSCAR) 5 mg tablet Take 1 Tab by mouth daily. For prostate 2/13/18   German Luciano MD   promethazine-[facility] WITH CODEINE) 6.25-10 mg/5 mL syrup Take 5 mL by mouth four (4) times daily as needed for Cough. Max Daily Amount: 20 mL. 2/2/18   Efrain Denis MD   hydroCHLOROthiazide (HYDRODIURIL) 25 mg tablet TAKE ONE TABLET BY MOUTH ONCE DAILY 10/18/17   German Luciano MD   pravastatin (PRAVACHOL) 20 mg tablet TAKE ONE TABLET BY MOUTH ONCE DAILY AT  NIGHT  FOR  CHOLESTEROL 10/18/17   German Luciano MD   terazosin (HYTRIN) 10 mg capsule TAKE ONE CAPSULE BY MOUTH ONCE DAILY FOR BLOOD PRESSURE AND PROSTATE 10/18/17   German Luciano MD   VIT C/E/ZN/COPPR/LUTEIN/ZEAXAN (PRESERVISION AREDS 2 PO) Take  by mouth daily. Historical Provider   furosemide (LASIX) 40 mg tablet One tab po daily as needed for fluid 5/16/16   German Luciano MD   Cholecalciferol, Vitamin D3, (VITAMIN D3) 1,000 unit Cap Take 1 Tab by mouth daily. Historical Provider   aspirin (ASPIRIN) 325 mg tablet Take 325 mg by mouth daily. Historical Provider   MULTIVITAMINS (MULTIPLE VITAMINS PO) Take 1 Tab by mouth daily.     Historical Provider     Allergies   Allergen Reactions    Ciprofloxacin Other (comments)     Nauseated, felt fuzzy    Erythromycin Unknown (comments)    Pcn [Penicillins] Swelling      Social History   Substance Use Topics    Smoking status: Former Smoker     Quit date: 1/1/1965    Smokeless tobacco: Never Used      Comment: 1965    Alcohol use No      Family History   Problem Relation Age of Onset    Diabetes Brother         Current Facility-Administered Medications   Medication Dose Route Frequency    terazosin (HYTRIN) capsule 10 mg  10 mg Oral QPM    cetirizine (ZYRTEC) tablet 10 mg  10 mg Oral DAILY    pravastatin (PRAVACHOL) tablet 20 mg  20 mg Oral DAILY    losartan (COZAAR) tablet 25 mg  25 mg Oral DAILY    finasteride (PROSCAR) tablet 5 mg  5 mg Oral DAILY    fluticasone-vilanterol (BREO ELLIPTA) 100mcg-25mcg/puff  1 Puff Inhalation DAILY    atenolol (TENORMIN) tablet 25 mg  25 mg Oral Q12H    allopurinol (ZYLOPRIM) tablet 300 mg  300 mg Oral DAILY    therapeutic multivitamin (THERAGRAN) tablet 1 Tab  1 Tab Oral DAILY    cholecalciferol (VITAMIN D3) tablet 1,000 Units  1,000 Units Oral DAILY    sodium chloride (NS) flush 5-10 mL  5-10 mL IntraVENous Q8H    furosemide (LASIX) tablet 40 mg  40 mg Oral DAILY       Review of Systems:  A comprehensive review of systems was negative except for: Respiratory: positive for hemoptysis    Objective:   Vital Signs:    Visit Vitals    /72 (BP 1 Location: Left arm, BP Patient Position: At rest)    Pulse 84    Temp 97.9 °F (36.6 °C)    Resp 18    Ht 5' 7\" (1.702 m)    Wt 69.3 kg (152 lb 12.5 oz)    SpO2 91%    BMI 23.93 kg/m2       O2 Device: Room air       Temp (24hrs), Av °F (36.7 °C), Min:97.8 °F (36.6 °C), Max:98.2 °F (36.8 °C)       Intake/Output:   Last shift:         Last 3 shifts:  1901 -  0700  In: 240 [P.O.:240]  Out: 450 [Urine:450]    Intake/Output Summary (Last 24 hours) at 18 0943  Last data filed at 18 1558   Gross per 24 hour   Intake                0 ml   Output              100 ml   Net             -100 ml      Physical Exam:   General:  Alert, cooperative, no distress, appears stated age. Head:  Normocephalic, without obvious abnormality, atraumatic. Eyes:  Conjunctivae/corneas clear. PERRL, EOMs intact. Nose: Nares normal. Septum midline. Mucosa normal. No drainage or sinus tenderness. Throat: Lips, mucosa, and tongue normal. Teeth and gums normal.   Neck: Supple, symmetrical, trachea midline, no adenopathy, thyroid: no enlargment/tenderness/nodules, no carotid bruit and no JVD. Back:   Symmetric, no curvature. ROM normal.   Lungs:   Clear to auscultation bilaterally. Decreased BS right base   Chest wall:  No tenderness or deformity. Heart:  Regular rate and rhythm, S1, S2 normal, no murmur, click, rub or gallop. Abdomen:   Soft, non-tender. Bowel sounds normal. No masses,  No organomegaly. Extremities: Extremities normal, atraumatic, no cyanosis or edema. Pulses: 2+ and symmetric all extremities. Skin: Skin color, texture, turgor normal. No rashes or lesions   Lymph nodes: Cervical, supraclavicular, and axillary nodes normal.   Neurologic: Grossly nonfocal     Data review:   No results found for this or any previous visit (from the past 24 hour(s)).     Imaging:  I have personally reviewed the patients radiographs and have reviewed the reports:  CXR: RUL mass, right pleural effusion        Rambo Goyal MD

## 2018-08-24 NOTE — PROGRESS NOTES
PCP TAWNY appt scheduled with Dr. Nathaniel Meyer on 8/30/2018 at 11:15am Appt added to AVS. Malaika Henley CM Specialist suite

## 2018-08-24 NOTE — PROGRESS NOTES
Reason for Admission:   Pt was admitted on 8/23/18 d/t a Dx of Hemoptysis likely secondary to Lung Cancer. HTN. Gout. Malignant neoplasm of upper lobe of L lung. Anemia. Pt is Full Code. RRAT Score:     25             Resources/supports as identified by patient/family:   Pt has Medicare and AARP. Top Challenges facing patient (as identified by patient/family and CM): Finances/Medication cost?      No concerns from Pt/family. Transportation? Pt drives but has a supportive family that can transport if needed. Support system or lack thereof? Pt has a supportive wife named Domo Ruff [phone] and a supportive DTR. Living arrangements? Pt lives with spouse in one story home with 4 NOEL. Pt has no DME. Pt pharmacy is [facility] in [place]. Self-care/ADLs/Cognition? Pt is alert and oriented. Pt I W ADLs and IADLs. Pt drives. Current Advanced Directive/Advance Care Plan:  Pt is Full Code. Pt NOK is Wife, Janes Givens. No written advanced directives. Plan for utilizing home health:    Pt has no experience with [facility] or SNF. MD discharging home today and no HH recommendations at discharge. Likelihood of readmission: HIGH                 Transition of Care Plan:        Pt is discharging home today. CM Specialist made PCP appointment. CM made appointment with Pulmonology and placed on AVS.  Dr. Garfield Skiff office will call and schedule follow up directly with Pt. Family will transport home in car. Pt has been here less than 24 hours so I do not need to give 2nd IM letter. No further CM needs. Care Management Interventions  PCP Verified by CM: Yes  Palliative Care Criteria Met (RRAT>21 & CHF Dx)?: No  Mode of Transport at Discharge:  Other (see comment)  Transition of Care Consult (CM Consult): Discharge Planning  [facility]: No  Discharge Durable Medical Equipment: No  Physical Therapy Consult: No  Occupational Therapy Consult: No  Speech Therapy Consult: No  Current Support Network: Lives with Spouse, Own Home  Confirm Follow Up Transport: Family  Plan discussed with Pt/Family/Caregiver: Yes  Freedom of Choice Offered: Yes  Discharge Location  Discharge Placement: Home with family assistance    Uziel, [phone]

## 2018-08-24 NOTE — DISCHARGE INSTRUCTIONS
PATIENT DISCHARGE INSTRUCTIONS      PATIENT DISCHARGE INSTRUCTIONS    Paulina Escalera / [id number] : 1933    Admitted 2018 Discharged: 2018       · It is important that you take the medication exactly as they are prescribed. · Keep your medication in the bottles provided by the pharmacist and keep a list of the medication names, dosages, and times to be taken in your wallet. · Do not take other medications without consulting your doctor. What to do at Home    Recommended Diet: Regular Diet    Recommended Activity: Activity as tolerated    If you experience any of the following symptoms worsening coughing up blood, please follow up with Dr. Linda Chinchilla.         Signed By: Shell Bullock MD     2018

## 2018-08-24 NOTE — PROGRESS NOTES
General Daily Progress Note    Admit Date: 8/22/2018  Hospital day 3    Subjective:     Patient has no complaint of shortness of breath. Hemoptysis did better last night, but has started to recur this am. ..   Medication side effects: none    Current Facility-Administered Medications   Medication Dose Route Frequency    terazosin (HYTRIN) capsule 10 mg  10 mg Oral QPM    cetirizine (ZYRTEC) tablet 10 mg  10 mg Oral DAILY    pravastatin (PRAVACHOL) tablet 20 mg  20 mg Oral DAILY    losartan (COZAAR) tablet 25 mg  25 mg Oral DAILY    finasteride (PROSCAR) tablet 5 mg  5 mg Oral DAILY    albuterol (PROVENTIL HFA, VENTOLIN HFA, PROAIR HFA) inhaler 2 Puff  2 Puff Inhalation Q4H PRN    fluticasone-vilanterol (BREO ELLIPTA) 100mcg-25mcg/puff  1 Puff Inhalation DAILY    atenolol (TENORMIN) tablet 25 mg  25 mg Oral Q12H    allopurinol (ZYLOPRIM) tablet 300 mg  300 mg Oral DAILY    therapeutic multivitamin (THERAGRAN) tablet 1 Tab  1 Tab Oral DAILY    promethazine-codeine (PHENERGAN with CODEINE) 6.25-10 mg/5 mL syrup 5 mL  5 mL Oral QID PRN    cholecalciferol (VITAMIN D3) tablet 1,000 Units  1,000 Units Oral DAILY    sodium chloride (NS) flush 5-10 mL  5-10 mL IntraVENous Q8H    sodium chloride (NS) flush 5-10 mL  5-10 mL IntraVENous PRN    ondansetron (ZOFRAN) injection 4 mg  4 mg IntraVENous Q4H PRN    bisacodyl (DULCOLAX) tablet 5 mg  5 mg Oral DAILY PRN    furosemide (LASIX) tablet 40 mg  40 mg Oral DAILY    ALPRAZolam (XANAX) tablet 0.25 mg  0.25 mg Oral TID PRN    acetaminophen (TYLENOL) tablet 650 mg  650 mg Oral Q4H PRN        Review of Systems  Pertinent items are noted in HPI.     Objective:     Patient Vitals for the past 8 hrs:   BP Temp Pulse Resp SpO2   08/24/18 0300 108/57 98 °F (36.7 °C) 65 20 93 %        08/22 1901 - 08/24 0700  In: 240 [P.O.:240]  Out: 450 [Urine:450]    Physical Exam:   Visit Vitals    /57 (BP 1 Location: Left arm, BP Patient Position: At rest)    Pulse 65    Temp 98 °F (36.7 °C)    Resp 20    Ht 5' 7\" (1.702 m)    Wt 152 lb 12.5 oz (69.3 kg)    SpO2 93%    BMI 23.93 kg/m2     Lungs: diminished breath sounds R base  Heart: regular rate and rhythm, S1, S2 normal, no murmur, click, rub or gallop  Abdomen: soft, non-tender. Bowel sounds normal. No masses,  no organomegaly  Extremities: extremities normal, atraumatic, no cyanosis or edema      ECG: normal sinus rhythm     Data Review No results found for this or any previous visit (from the past 24 hour(s)). Assessment:     Principal Problem:    Hemoptysis (8/23/2018)    Active Problems:    Essential hypertension (4/30/2010)      Gout (4/30/2010)      Malignant neoplasm of upper lobe of left lung ([address] (8/23/2018)      Pleural effusion (8/23/2018)        Plan:     1. Hemoptysis secondary to lung cancer- could exsanguinate in the near future. Have told pt and family this. Not sure if XRT would do any good in short term or not. Ask pulmonary to see. 2. Recurrent R pleural effusion. Has had thoracentesis x3. Could do Chest tube and talc pleurodesis, but pt isnt terribly symptomatic at present and he will not do well staying in hospital hooked up to a tube  3. Appreciate oncologys help- apparently [facility] will come in on Tuesday. Have discussed hospice, pt wants to at least try the chemo.

## 2018-08-24 NOTE — CONSULTS
[address]     Inpatient Consultation    8/24/2018    Gloria Watkins    Subjective:       History of Present Illness:  Gloria Watkins is a 80 y.o., male admitted with hemoptysis secondary to recently diagnosed lung cancer. I was asked by Dr Himanshu Conner to see this patient for consideration of palliative radiotherapy. The hemoptysis has been occurring for about 2 days PTA. His lung cancer is associated with a large rt pleural effusion and RUL atelectasis. His tumor is EGFR+ and is scheduled to begin [facility] next week. Today, the hemoptysis and cough are much better.     Patient Active Problem List    Diagnosis Date Noted    Hemoptysis 08/23/2018    Malignant neoplasm of upper lobe of left lung ([address] 08/23/2018    Pleural effusion 08/23/2018    Type 2 diabetes mellitus with nephropathy ([address] 01/11/2018    Diabetes mellitus type 2, diet-controlled ([address] 05/23/2016    Diabetes mellitus, type 2 ([address]     White coat hypertension 02/09/2015    Essential hypertension 04/30/2010    Gout 04/30/2010    Elevated PSA 04/30/2010    Hernia of unspecified site of abdominal cavity without mention of obstruction or gangrene 04/30/2010     Past Medical History:  Past Medical History:   Diagnosis Date    BP (high blood pressure) 4/30/2010    Cancer ([address]     Lung    Diabetes mellitus, type 2 (HCC)     Elevated PSA 4/30/2010    Elevated PSA     has had 2 biopsies, both were negative for cancer    Gout 4/30/2010      Past Surgical History  Past Surgical History:   Procedure Laterality Date    CHEST SURGERY PROCEDURE UNLISTED      Previous thoracenteses    HX COLONOSCOPY      HX HERNIA REPAIR        Allergies   Allergen Reactions    Ciprofloxacin Other (comments)     Nauseated, felt fuzzy    Erythromycin Unknown (comments)    Pcn [Penicillins] Swelling      Social History  Social History   Substance Use Topics    Smoking status: Former Smoker     Quit date: 1/1/1965    Smokeless tobacco: Never Used      Comment: 1965    Alcohol use No      Family History   Problem Relation Age of Onset    Diabetes Brother       Medications  Current Facility-Administered Medications   Medication    chlorpheniramine-HYDROcodone (TUSSIONEX) oral suspension 5 mL    terazosin (HYTRIN) capsule 10 mg    cetirizine (ZYRTEC) tablet 10 mg    pravastatin (PRAVACHOL) tablet 20 mg    losartan (COZAAR) tablet 25 mg    finasteride (PROSCAR) tablet 5 mg    albuterol (PROVENTIL HFA, VENTOLIN HFA, PROAIR HFA) inhaler 2 Puff    fluticasone-vilanterol (BREO ELLIPTA) 100mcg-25mcg/puff    atenolol (TENORMIN) tablet 25 mg    allopurinol (ZYLOPRIM) tablet 300 mg    therapeutic multivitamin (THERAGRAN) tablet 1 Tab    promethazine-codeine (PHENERGAN with CODEINE) 6.25-10 mg/5 mL syrup 5 mL    cholecalciferol (VITAMIN D3) tablet 1,000 Units    sodium chloride (NS) flush 5-10 mL    sodium chloride (NS) flush 5-10 mL    ondansetron (ZOFRAN) injection 4 mg    bisacodyl (DULCOLAX) tablet 5 mg    furosemide (LASIX) tablet 40 mg    ALPRAZolam (XANAX) tablet 0.25 mg    acetaminophen (TYLENOL) tablet 650 mg     Review of Systems:  Pertinent items are noted in the History of Present Illness.      Objective:     Visit Vitals    BP 98/81 (BP 1 Location: Left arm, BP Patient Position: At rest;Sitting)    Pulse 81    Temp 97.3 °F (36.3 °C)    Resp 20    Ht 5' 7\" (1.702 m)    Wt 69.3 kg (152 lb 12.5 oz)    SpO2 95%    BMI 23.93 kg/m2         Physical Exam:   Visit Vitals    /63    Pulse 97    Temp 97.3 °F (36.3 °C)    Resp 20    Ht 5' 7\" (1.702 m)    Wt 69.3 kg (152 lb 12.5 oz)    SpO2 95%    BMI 23.93 kg/m2     General appearance: alert, cooperative, no distress, appears stated age  Neck: supple, symmetrical, trachea midline, no adenopathy, thyroid: not enlarged, symmetric, no tenderness/mass/nodules, no carotid bruit and no JVD  Lungs: Lt lung normal, Rt lung diminished BS in the base. 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 Plan:    Although palliative RT can be effective for controlling acute hemoptysis it is difficult to define a locus for the bleeding in this patient. The pleural effusion and atelectatic lung obscures the lung mass and makes it nearly impossible to define the source of bleeding. I would be \"shooting in the dark\" if I attempted to treat him. Therefore, I do not favor radiation treatment at this time. Instead, recommendations for supportive care, antibiotics  and cough suppression are reasonable. Hopefully, he can start EGFR inhibitor next week. His symptoms seem to be improved with conservative management.         Signed By: Raymond Grande MD    Radiation Oncology Service   [phone]    August 24, 2018

## 2018-08-24 NOTE — PROGRESS NOTES
-Hematology / Oncology ([facility]) -  -Primary Oncologist- [phone] denies hemoptysis today, denies SOB/CARTAGENA. Still with cough. -O-    Patient Vitals for the past 24 hrs:   Temp Pulse Resp BP SpO2   08/24/18 1210 97.3 °F (36.3 °C) 81 20 98/81 95 %   08/24/18 0746 97.9 °F (36.6 °C) 84 18 119/72 91 %   08/24/18 0300 98 °F (36.7 °C) 65 20 108/57 93 %   08/23/18 2300 98 °F (36.7 °C) 66 20 122/63 94 %   08/23/18 2229 97.8 °F (36.6 °C) 68 20 120/63 95 %   08/23/18 1937 98 °F (36.7 °C) 71 20 108/65 96 %   08/23/18 1558 98.2 °F (36.8 °C) 68 20 105/50 96 %        Gen: nad  Chest: bilateral breath sounds present  Cardiac: rrr  Abd: s/nt    -Labs-    Recent Labs      08/23/18   0208  08/22/18   2216   WBC  4.5  5.2   HGB  9.7*  10.2*   PLT  157  168   ANEU  3.3  3.7   INR   --   1.1   APTT   --   27.1   NA  139  140   K  3.3*  3.4*   GLU  128*  157*   BUN  34*  39*   CREA  0.88  1.03   ALT   --   14   SGOT   --   26   TBILI   --   0.4   AP   --   675*   CA  8.4*  8.9   MG   --   2.0       -Imaging-   8/22/18 CTA Chest:  1. There is no acute pulmonary embolism.      2. Increase in size of a right upper lobe lung mass. Increased right upper lobe  atelectasis. There is no new lung nodule.      3. Thoracic metastatic lymphadenopathy is again demonstrated.      3. Moderate right and small left pleural effusions.      4. A left pleural nodule measuring 1.2 cm is suspicious for metastatic disease.      5. Scattered bony metastases best seen in the right fifth anteriorly and left  sixth rib posteriorly.       -Assessment + Plan-     *)  Mr. Everton Harmon has a pmh of stage IV NSCLC admitted with hemoptysis:     *) Hemoptysis:  -- Due to lung cancer. Would not resume ASA. -- Hemoptysis resolved today, VS stable. Ok to d/c from my standpoint. -- Tussonex prn cough       *) Stage IV NSCLC, bone mets:  -- He has EGFR+ disease and plans to start first line Tagrisso per Dr. Flores Esposito. Medication being delivered to office next Tuesday. -- Reminded him to get dental appt in prep for Xgeva given bone mets. -- Will have office call him with f/u apt with Dr Jay Jay Alvarado in next few weeks.        *) Rt Sided Recurrent Malignant Pleural Effusion:  -- He has recurrent malignant effusion, now has moderate effusion. Pulm consulted. Considering aspira catheter.   -- Sating fine on RA

## 2018-08-25 NOTE — DISCHARGE SUMMARY
[address] [place]  MR#: [id number]  : 1933  ACCOUNT #: [de-identified]   ADMIT DATE: 2018  DISCHARGE DATE: 2018    FINAL DIAGNOSES:  1. Hemoptysis. 2.  Adenocarcinoma of the lung. 3.  Right pleural effusion. 4.  Anemia. CONSULTATIONS:  Oncology,  , pulmonary, Dr. Jason Roberson, and radiation therapy, Dr. De La Torre . DISCHARGE MEDICATIONS:  Doxycycline 100 mg b.i.d., Phenergan DM p.r.n. cough. Patient's aspirin will be stopped. Other medications include albuterol inhaler p.r.n., allopurinol 300 mg daily, alprazolam 0.5 mg q.12 hours p.r.n., atenolol 50 mg half tab daily,  Zyrtec 10 mg daily, finasteride 5 mg daily, Breo Ellipta 1 inhalation daily, hydrochlorothiazide 12.5 mg daily, losartan 25 mg daily, Pravastatin 20 mg daily, terazosin 10 mg daily, Halcion 0.25 mg daily. HISTORY OF PRESENT ILLNESS:  The patient is a very pleasant 80-year-old white male with a history of gout, hypertension, recently diagnosed adenocarcinoma of the lung came who to the emergency room with worsening hemoptysis. This actually started a few weeks ago, had been associated with worsening shortness of breath. The patient recently was diagnosed with lung cancer. He has been seen by Oncology. He was supposed to start chemotherapy some 2 weeks ago. However, the chemo has been delayed because of his insurance. Apparently, his Aguilar De La Cruz was supposed to come to the oncologist's office next week. Patient was seen in the emergency room. CT of the chest was done, no PE. It did show increase in size of the right upper lobe lung mass and increase in right upper lobe atelectasis. He denied any fever or chills. PAST MEDICAL HISTORY:  Significant for recently diagnosed lung cancer, hypertension, history of diabetes, history of elevated PSA for which the patient had 2 negative biopsies, and history of gout.     PAST SURGICAL HISTORY:  Includes 3 recent thoracenteses for his right pleural effusion, his colonoscopy, his hernia repair. SOCIAL HISTORY:  Smoking, the patient quit in 1965. Alcohol use is negative. ALLERGIES:  CIPRO, ERYTHROMYCIN, AND PENICILLIN. MEDICATIONS ON ADMISSION:  Halcion 0.25 mg at bedtime, alprazolam 0.5 mg p.r.n., allopurinol 300 mg daily, albuterol inhaler p.r.n., atenolol 50 mg half tab daily, Cozaar 25 mg daily, Zyrtec 10 mg daily, Breo Ellipta 1 inhalation daily, finasteride 5 mg daily, hydrochlorothiazide 25 mg daily, Pravastatin 20 mg daily, terazosin 10 mg daily, aspirin 325 mg daily. REVIEW OF SYSTEMS:  Please see HPI. PHYSICAL EXAMINATION:  VITAL SIGNS:  In the emergency room, blood pressure 170/73, pulse 73, temperature 97.4, respiratory rate 20, O2 sat 95%. GENERAL:  Alert, cooperative. LUNGS:  Diminished breath sounds on the right side. No wheezing or rhonchi. CARDIOVASCULAR:  Regular rate and rhythm. No gallop. No edema. ABDOMEN:  Soft, nontender, no masses. NEUROLOGIC:  Nonfocal.    HOSPITAL COURSE:  The patient was admitted. He was seen in consultation by Oncology. Apparently, his Vitaly James is supposed to come to their office on Tuesday. He is also seen in consultation by Dr. Serina Moreno who suggested maybe getting an outpatient PleurX Aspira catheter. Also seen in consultation by Dr. Neto Fisher, radiation oncology because of the surrounding atelectasis and this lung mass. It was felt like he would not be a good candidate for radiation for control of his hemoptysis. His hemoptysis seemed to have been improved. He did not seem to be acutely short of breath enough to need a thoracentesis during this hospitalization. He was stable. He was discharged home on 08/24 and will follow up in my office next week. DISPOSITION:  The patient was discharged home.       Isela Montanze MD MS/  D: 08/24/2018 14:43     T: 08/25/2018 13:19  JOB #: [id number]

## 2018-08-27 ENCOUNTER — PATIENT OUTREACH (OUTPATIENT)
Dept: FAMILY MEDICINE CLINIC | Age: 83
End: 2018-08-27

## 2018-08-27 NOTE — Clinical Note
Patient wants you to set up appointment for thoracentesis. He did not have while inpatient. Moved appointment from 8/30 to 8/28 to see Dr. Courtney Dorsey.

## 2018-08-27 NOTE — PROGRESS NOTES
Hospital Discharge Follow-Up      Date/Time:  2018 10:37 AM    Patient was admitted to [facility] on 18 and discharged on 18 for hemoptysis/lung ca. The physician discharge summary was available at the time of outreach. Patient was contacted within 1 business days of discharge. Top Challenges reviewed with the provider   Fluid build up in lung  Chemo start date         Method of communication with provider :chart routing    Inpatient RRAT score: 25  Was this a readmission? no   Patient stated reason for the readmission: n/a    Nurse Navigator (NN) contacted the patient by telephone to perform post hospital discharge assessment. Verified name and  with patient as identifiers. Provided introduction to self, and explanation of the Nurse Navigator role. Reviewed discharge instructions and red flags with patient who verbalized understanding. Patient given an opportunity to ask questions and does not have any further questions or concerns at this time. The patient agrees to contact the PCP office for questions related to their healthcare. NN provided contact information for future reference. Disease Specific:   N/A    Summary of patient's top problems:  1. Lung Ca--chemo delayed due to insurance. Dr. Shasta Ortiz office to call patient 18 to nform if medication has been received., multiple thoracentesis,. Unsure if Palliative/Hospice has spoken with patient Dr. Heidy Lozada recommend while inpatient, No advance care directive on file    Home Health orders at discharge: [facility] Stret: n/a  Date of initial visit: n/a    Durable Medical Equipment ordered/company: none  Durable Medical Equipment received: n/a    Barriers to care? lack of knowledge about disease, medication management    Advance Care Planning:   Does patient have an Advance Directive:  not on file Will address at next week outreach.      Medication(s):   New Medications at Discharge: Doxycycline, Promethazine DM  Changed Medications at Discharge: none  Discontinued Medications at Discharge: , Lomotil, Lasix, Phenergan with codeine    Medication reconciliation was performed with patient, who verbalizes understanding of administration of home medications. There were no barriers to obtaining medications identified at this time. Referral to Pharm D needed: no     Current Outpatient Prescriptions   Medication Sig    promethazine-dextromethorphan (PROMETHAZINE-DM) 6.25-15 mg/5 mL syrup Take 5 mL by mouth four (4) times daily as needed for Cough.  doxycycline (VIBRAMYCIN) 100 mg capsule Take 1 Cap by mouth two (2) times a day for 7 days.  losartan (COZAAR) 25 mg tablet Take 1 Tab by mouth daily. For blood pressure and kidneys    triazolam (HALCION) 0.25 mg tablet Take 1 Tab by mouth nightly as needed. Max Daily Amount: 0.25 mg.    ALPRAZolam (XANAX) 0.5 mg tablet TAKE ONE TABLET BY MOUTH TWICE DAILY AS NEEDED FOR ANXIETY    allopurinol (ZYLOPRIM) 300 mg tablet TAKE ONE TABLET BY MOUTH ONCE DAILY TO PREVENT GOUT    albuterol (PROVENTIL HFA, VENTOLIN HFA, PROAIR HFA) 90 mcg/actuation inhaler Take 2 Puffs by inhalation every four (4) hours as needed for Shortness of Breath.  atenolol (TENORMIN) 50 mg tablet TAKE 1/2  TABLET BY MOUTH TWICE DAILY FOR BLOOD PRESSURE    cetirizine (ZYRTEC) 10 mg tablet Take 1 Tab by mouth daily.  fluticasone-vilanterol (BREO ELLIPTA) 100-25 mcg/dose inhaler Take 1 Puff by inhalation daily.  finasteride (PROSCAR) 5 mg tablet Take 1 Tab by mouth daily. For prostate    hydroCHLOROthiazide (HYDRODIURIL) 25 mg tablet TAKE ONE TABLET BY MOUTH ONCE DAILY    pravastatin (PRAVACHOL) 20 mg tablet TAKE ONE TABLET BY MOUTH ONCE DAILY AT  NIGHT  FOR  CHOLESTEROL    terazosin (HYTRIN) 10 mg capsule TAKE ONE CAPSULE BY MOUTH ONCE DAILY FOR BLOOD PRESSURE AND PROSTATE    VIT C/E/ZN/COPPR/LUTEIN/ZEAXAN (PRESERVISION AREDS 2 PO) Take  by mouth daily.     Cholecalciferol, Vitamin D3, (VITAMIN D3) 1,000 unit Cap Take 1 Tab by mouth daily.  MULTIVITAMINS (MULTIPLE VITAMINS PO) Take 1 Tab by mouth daily. No current facility-administered medications for this visit. There are no discontinued medications. [facility] follow up appointment(s):   Future Appointments  Date Time Provider Agus Romero   8/28/2018 9:45 AM Jose Malloy MD [address]   9/11/2018 8:45 AM Jose Malloy MD [address]   12/4/2018 9:00 AM Jose Malloy MD [address]      Non-[facility] follow up appointment(s): Dr. Garrison Wilkins 9/6/18 and Dr. Elyssa Avila office is to contact patient 8/28/18 regarding appointment for chemo start. [facility]:  n/a       Goals      Advance care plan            8/27/18 deferred to next week outreach. HALLEY       Attends follow-up appointments as directed. 8/27/18 Patient states he believes it is time for him to have fluid taken off his lung. Request Dr. Nicole Huntley set up an appointment. Patient currently scheduled to see PCP on 8/30/18 moved to 8/28/18 at 0945 to evaluate need for thoracentesis since was not needed before discharge on 8/24/18. pulm scheduled for 9/6/18 and waiting to hear from onc for appointment date. Will monitor and report attendance at next week outreach.  HALLEY

## 2018-08-28 ENCOUNTER — HOSPITAL ENCOUNTER (OUTPATIENT)
Dept: GENERAL RADIOLOGY | Age: 83
Discharge: HOME | End: 2018-08-28
Payer: MEDICARE

## 2018-08-28 ENCOUNTER — OFFICE VISIT (OUTPATIENT)
Dept: FAMILY MEDICINE CLINIC | Age: 83
End: 2018-08-28

## 2018-08-28 VITALS
OXYGEN SATURATION: 95 % | BODY MASS INDEX: 24.01 KG/M2 | RESPIRATION RATE: 18 BRPM | TEMPERATURE: 96.3 F | HEIGHT: 67 IN | DIASTOLIC BLOOD PRESSURE: 66 MMHG | WEIGHT: 153 LBS | SYSTOLIC BLOOD PRESSURE: 135 MMHG | HEART RATE: 102 BPM

## 2018-08-28 DIAGNOSIS — C34.11 MALIGNANT NEOPLASM OF UPPER LOBE OF RIGHT LUNG (HCC): ICD-10-CM

## 2018-08-28 DIAGNOSIS — R05.9 COUGH: ICD-10-CM

## 2018-08-28 DIAGNOSIS — R06.02 SHORTNESS OF BREATH: ICD-10-CM

## 2018-08-28 DIAGNOSIS — R07.89 OTHER CHEST PAIN: Primary | ICD-10-CM

## 2018-08-28 PROCEDURE — 71046 X-RAY EXAM CHEST 2 VIEWS: CPT

## 2018-08-28 RX ORDER — PROMETHAZINE HYDROCHLORIDE AND CODEINE PHOSPHATE 6.25; 1 MG/5ML; MG/5ML
1 SOLUTION ORAL
Qty: 240 ML | Refills: 1 | Status: SHIPPED | OUTPATIENT
Start: 2018-08-28 | End: 2018-12-04 | Stop reason: ALTCHOICE

## 2018-08-28 RX ORDER — MORPHINE SULFATE 15 MG/1
TABLET ORAL
Qty: 50 TAB | Refills: 0 | Status: SHIPPED | OUTPATIENT
Start: 2018-08-28 | End: 2018-09-27 | Stop reason: SDUPTHER

## 2018-08-28 NOTE — PROGRESS NOTES
HISTORY OF PRESENT ILLNESS  Davi Parham is a 80 y.o. male. HPI Comes in with daughter Verner Nanny. Has been having chest pains, worse when stands up or walks. Chest pain does better when sits down. Also having some stomach pain. Hemoptysis is doing much better. [facility] meds have come in. Having some mild dyspnea. ROS    Physical Exam   Constitutional: He appears well-developed and well-nourished. HENT:   Right Ear: External ear normal.   Left Ear: External ear normal.   Mouth/Throat: Oropharynx is clear and moist.   Neck: No thyromegaly present. Cardiovascular: Normal rate, regular rhythm, normal heart sounds and intact distal pulses. Pulmonary/Chest: Effort normal and breath sounds normal. No respiratory distress. He has no wheezes. Abdominal: Soft. Bowel sounds are normal. He exhibits no distension and no mass. There is no tenderness. There is no guarding. Musculoskeletal: Normal range of motion. He exhibits no edema. Lymphadenopathy:     He has no cervical adenopathy. Nursing note and vitals reviewed. ASSESSMENT and PLAN  Orders Placed This Encounter    XR CHEST PA LAT     Standing Status:   Future     Number of Occurrences:   1     Standing Expiration Date:   2018     Order Specific Question:   Reason for Exam     Answer:   dyspnea, lung cancer     Order Specific Question:   Is Patient Allergic to Contrast Dye? Answer:   No    AMB POC EKG ROUTINE W/ 12 LEADS, INTER & REP     Order Specific Question:   Reason for Exam:     Answer:   chest pain    promethazine-codeine (PHENERGAN WITH CODEINE) 6.25-10 mg/5 mL syrup     Sig: Take 5 mL by mouth every six (6) hours as needed for Cough. Max Daily Amount: 20 mL.      Dispense:  240 mL     Refill:  1    morphine IR (MS IR) 15 mg tablet     Si-2 tabs po every 4-6 hours as needed for pain     Dispense:  50 Tab     Refill:  0     Orders Placed This Encounter    XR CHEST PA LAT    AMB POC EKG ROUTINE W/ 12 LEADS, INTER & REP    promethazine-codeine (PHENERGAN WITH CODEINE) 6.25-10 mg/5 mL syrup    morphine IR (MS IR) 15 mg tablet     Diagnoses and all orders for this visit:    1. Other chest pain  -     AMB POC EKG ROUTINE W/ 12 LEADS, INTER & REP  -     morphine IR (MS IR) 15 mg tablet; 1-2 tabs po every 4-6 hours as needed for pain    2. Shortness of breath  -     XR CHEST PA LAT; Future    3. Cough  -     promethazine-codeine (PHENERGAN WITH CODEINE) 6.25-10 mg/5 mL syrup; Take 5 mL by mouth every six (6) hours as needed for Cough. Max Daily Amount: 20 mL.     4. Malignant neoplasm of upper lobe of right lung (HCC)  -     morphine IR (MS IR) 15 mg tablet; 1-2 tabs po every 4-6 hours as needed for pain

## 2018-08-28 NOTE — PROGRESS NOTES
Chief Complaint   Patient presents with    Shortness of Breath    Chest Pain    ED Follow-up     8/22/18  [facility]    hemoptysis      1. Have you been to the ER, urgent care clinic since your last visit? Hospitalized since your last visit? No    2. Have you seen or consulted any other health care providers outside of the [address] [place] [address] since your last visit? Include any pap smears or colon screening.  No     Health Maintenance Due   Topic Date Due    EYE EXAM RETINAL OR DILATED Q1  06/08/2017    Pneumococcal 65+ High/Highest Risk (2 of 2 - PPSV23) 08/28/2017    HEMOGLOBIN A1C Q6M  04/18/2018    GLAUCOMA SCREENING Q2Y  06/08/2018    Influenza Age 9 to Adult  08/01/2018

## 2018-08-29 DIAGNOSIS — J90 RECURRENT RIGHT PLEURAL EFFUSION: Primary | ICD-10-CM

## 2018-08-31 ENCOUNTER — HOSPITAL ENCOUNTER (OUTPATIENT)
Dept: GENERAL RADIOLOGY | Age: 83
Discharge: HOME | End: 2018-08-31
Attending: FAMILY MEDICINE
Payer: MEDICARE

## 2018-08-31 ENCOUNTER — HOSPITAL ENCOUNTER (OUTPATIENT)
Dept: ULTRASOUND IMAGING | Age: 83
Discharge: HOME | End: 2018-08-31
Attending: FAMILY MEDICINE
Payer: MEDICARE

## 2018-08-31 VITALS
OXYGEN SATURATION: 97 % | SYSTOLIC BLOOD PRESSURE: 134 MMHG | TEMPERATURE: 98 F | WEIGHT: 153 LBS | DIASTOLIC BLOOD PRESSURE: 62 MMHG | RESPIRATION RATE: 18 BRPM | BODY MASS INDEX: 24.01 KG/M2 | HEART RATE: 89 BPM | HEIGHT: 67 IN

## 2018-08-31 DIAGNOSIS — J90 RECURRENT RIGHT PLEURAL EFFUSION: ICD-10-CM

## 2018-08-31 PROCEDURE — 74011250636 HC RX REV CODE- 250/636: Performed by: RADIOLOGY

## 2018-08-31 PROCEDURE — 32555 ASPIRATE PLEURA W/ IMAGING: CPT

## 2018-08-31 PROCEDURE — 71045 X-RAY EXAM CHEST 1 VIEW: CPT

## 2018-08-31 RX ORDER — LIDOCAINE HYDROCHLORIDE 10 MG/ML
10 INJECTION INFILTRATION; PERINEURAL
Status: COMPLETED | OUTPATIENT
Start: 2018-08-31 | End: 2018-08-31

## 2018-08-31 RX ADMIN — LIDOCAINE HYDROCHLORIDE 10 ML: 10 INJECTION, SOLUTION INFILTRATION; PERINEURAL at 15:00

## 2018-08-31 NOTE — IP AVS SNAPSHOT
[address] [facility] 
[phone] Patient: Michelle Baer MRN: [id number] About your hospitalization You were admitted on:  August 31, 2018 You last received care in the:  [facility] You were discharged on:  August 31, 2018 Why you were hospitalized Your primary diagnosis was:  Not on File Follow-up Information None Your Scheduled Appointments Friday August 31, 2018  2:30 PM EDT  
[place] [address] with Lashawn Drake MD, [address] [facility] [address]) [address] [facility]  
[phone] GENERAL INSTRUCTIONS 1. Bring any non [facility] facility films/reports pertaining to the area being studied with you on the day of appointment. 2. Bring a list of all medications you are currently taking, including over the counter medications. 3. A written order with a valid diagnosis and Physicians signature is required for all scheduled tests. 4. Blood thinners and platelet inhibitors should be stopped 3-5 days prior to procedure. Consult your ordering physician prior to stopping them. 5. Check in at registration 30 minutes before your appointment time unless you were instructed to do otherwise. 6. A  is required for the procedure. For some patients, there may be some unsteadiness post procedure. If no  is present, the patient may have to remain in the department for a longer period of time. 7. The procedure may last 1-1 ½ hours. You may be required to stay 4-6 hours after the procedure for observation. --Lab work for bleeding times (INR, PT , PTT and platelets) should be be completed at least the day before the exam.  Without this information the patient is delayed or  rescheduled. Patient should report to outpatient registration ([facility]) 30 minutes prior to the appointment time unless instructed otherwise. Tuesday September 11, 2018  8:45 AM EDT ROUTINE CARE with Jay Sanabria MD  
[facility] [address] [facility] [address] [id number] [phone] Discharge Orders None A check jc indicates which time of day the medication should be taken. My Medications ASK your doctor about these medications Instructions Each Dose to Equal  
 Morning Noon Evening Bedtime  
 albuterol 90 mcg/actuation inhaler Commonly known as:  PROVENTIL HFA, VENTOLIN HFA, PROAIR HFA Your last dose was: Your next dose is: Take 2 Puffs by inhalation every four (4) hours as needed for Shortness of Breath. 2 Puff  
    
   
   
   
  
 allopurinol 300 mg tablet Commonly known as:  José Rhodes Your last dose was: Your next dose is: TAKE ONE TABLET BY MOUTH ONCE DAILY TO PREVENT GOUT  
     
   
   
   
  
 ALPRAZolam 0.5 mg tablet Commonly known as:  Ken Raya Your last dose was: Your next dose is: TAKE ONE TABLET BY MOUTH TWICE DAILY AS NEEDED FOR ANXIETY  
     
   
   
   
  
 atenolol 50 mg tablet Commonly known as:  TENORMIN Your last dose was: Your next dose is: TAKE 1/2  TABLET BY MOUTH TWICE DAILY FOR BLOOD PRESSURE  
     
   
   
   
  
 cetirizine 10 mg tablet Commonly known as:  ZYRTEC Your last dose was: Your next dose is: Take 1 Tab by mouth daily. 10 mg  
    
   
   
   
  
 doxycycline 100 mg capsule Commonly known as:  VIBRAMYCIN Your last dose was: Your next dose is: Take 1 Cap by mouth two (2) times a day for 7 days. 100 mg  
    
   
   
   
  
 finasteride 5 mg tablet Commonly known as:  PROSCAR  
   
 Your last dose was: Your next dose is: Take 1 Tab by mouth daily. For prostate 5 mg  
    
   
   
   
  
 fluticasone-vilanterol 100-25 mcg/dose inhaler Commonly known as:  BREO ELLIPTA Your last dose was: Your next dose is: Take 1 Puff by inhalation daily. 1 Puff  
    
   
   
   
  
 hydroCHLOROthiazide 25 mg tablet Commonly known as:  HYDRODIURIL Your last dose was: Your next dose is: TAKE ONE TABLET BY MOUTH ONCE DAILY losartan 25 mg tablet Commonly known as:  COZAAR Your last dose was: Your next dose is: Take 1 Tab by mouth daily. For blood pressure and kidneys 25 mg  
    
   
   
   
  
 morphine IR 15 mg tablet Commonly known as:  MS IR Your last dose was: Your next dose is:    
   
   
 1-2 tabs po every 4-6 hours as needed for pain MULTIPLE VITAMINS PO Your last dose was: Your next dose is: Take 1 Tab by mouth daily. 1 Tab  
    
   
   
   
  
 pravastatin 20 mg tablet Commonly known as:  PRAVACHOL Your last dose was: Your next dose is: TAKE ONE TABLET BY MOUTH ONCE DAILY AT  NIGHT  FOR  CHOLESTEROL  
     
   
   
   
  
 PRESERVISION AREDS 2 PO Your last dose was: Your next dose is: Take  by mouth daily. promethazine-codeine 6.25-10 mg/5 mL syrup Commonly known as:  PHENERGAN with CODEINE Your last dose was: Your next dose is: Take 5 mL by mouth every six (6) hours as needed for Cough. Max Daily Amount: 20 mL. 1 tsp  
    
   
   
   
  
 promethazine-dextromethorphan 6.25-15 mg/5 mL syrup Commonly known as:  PROMETHAZINE-DM Your last dose was: Your next dose is: Take 5 mL by mouth four (4) times daily as needed for Cough. 5 mL terazosin 10 mg capsule Commonly known as:  HYTRIN Your last dose was: Your next dose is: TAKE ONE CAPSULE BY MOUTH ONCE DAILY FOR BLOOD PRESSURE AND PROSTATE  
     
   
   
   
  
 triazolam 0.25 mg tablet Commonly known as:  Covina Your last dose was: Your next dose is: Take 1 Tab by mouth nightly as needed. Max Daily Amount: 0.25 mg.  
 0.25 mg  
    
   
   
   
  
 VITAMIN D3 1,000 unit Cap Generic drug:  cholecalciferol Your last dose was: Your next dose is: Take 1 Tab by mouth daily. 1 Tab Opioid Education Prescription Opioids: What You Need to Know: 
 
Prescription opioids can be used to help relieve moderate-to-severe pain and are often prescribed following a surgery or injury, or for certain health conditions. These medications can be an important part of treatment but also come with serious risks. Opioids are strong pain medicines. Examples include hydrocodone, oxycodone, fentanyl, and morphine. Heroin is an example of an illegal opioid. It is important to work with your health care provider to make sure you are getting the safest, most effective care. WHAT ARE THE RISKS AND SIDE EFFECTS OF OPIOID USE? Prescription opioids carry serious risks of addiction and overdose, especially with prolonged use. An opioid overdose, often marked by slow breathing, can cause sudden death. The use of prescription opioids can have a number of side effects as well, even when taken as directed. · Tolerance-meaning you might need to take more of a medication for the same pain relief · Physical dependence-meaning you have symptoms of withdrawal when the medication is stopped. Withdrawal symptoms can include nausea, sweating, chills, diarrhea, stomach cramps, and muscle aches. Withdrawal can last up to several weeks, depending on which drug you took and how long you took it. · Increased sensitivity to pain · Constipation · Nausea, vomiting, and dry mouth · Sleepiness and dizziness · Confusion · Depression · Low levels of testosterone that can result in lower sex drive, energy, and strength · Itching and sweating RISKS ARE GREATER WITH:      
· History of drug misuse, substance use disorder, or overdose · Mental health conditions (such as depression or anxiety) · Sleep apnea · Older age (72 years or older) · Pregnancy Avoid alcohol while taking prescription opioids. Also, unless specifically advised by your health care provider, medications to avoid include: · Benzodiazepines (such as Xanax or Valium) · Muscle relaxants (such as Soma or Flexeril) · Hypnotics (such as Ambien or Lunesta) · Other prescription opioids KNOW YOUR OPTIONS Talk to your health care provider about ways to manage your pain that don't involve prescription opioids. Some of these options may actually work better and have fewer risks and side effects. Options may include: 
· Pain relievers such as acetaminophen, ibuprofen, and naproxen · Some medications that are also used for depression or seizures · Physical therapy and exercise · Counseling to help patients learn how to cope better with triggers of pain and stress. · Application of heat or cold compress · Massage therapy · Relaxation techniques Be Informed Make sure you know the name of your medication, how much and how often to take it, and its potential risks & side effects. IF YOU ARE PRESCRIBED OPIOIDS FOR PAIN: 
· Never take opioids in greater amounts or more often than prescribed. Remember the goal is not to be pain-free but to manage your pain at a tolerable level. · Follow up with your primary care provider to: · Work together to create a plan on how to manage your pain. · Talk about ways to help manage your pain that don't involve prescription opioids. · Talk about any and all concerns and side effects. · Help prevent misuse and abuse. · Never sell or share prescription opioids · Help prevent misuse and abuse. · Store prescription opioids in a secure place and out of reach of others (this may include visitors, children, friends, and family). · Safely dispose of unused/unwanted prescription opioids: Find your community drug take-back program or your pharmacy mail-back program, or flush them down the toilet, following guidance from the Food and Drug Administration (www.fda.gov/Drugs/ResourcesForYou). · Visit www.cdc.gov/drugoverdose to learn about the risks of opioid abuse and overdose. · If you believe you may be struggling with addiction, tell your health care provider and ask for guidance or call [facility] at 5-141-683-WZCW. Discharge Instructions [facility] Radiology Department [phone] Radiologist: Dr. Zackary Kruger Date: 8/31/2018 Thoracentesis Discharge Instructions You may have an aching pain in the puncture site tonight as the numbing medicine wears off. You may take Tylenol, as directed on the label, for pain or discomfort. Avoid ibuprofen (Advil, Motrin) and aspirin products for the next 48 hours as these drugs may increase your chance of bleeding. You have develop a mild cough as the lungs re expand with air, this should resolve with in the next 24 hours. Resume your previous diet and continue your prescribed medicaitons. Rest for the next 24 hours. If you experience shortness of breath (other than your normal breathing pattern) difficulty breathing, or have severe chest pain, call 911 and go to the nearest Emergency Room. Be sure to follow up with your referring physician as soon as possible ACO Transitions of Care Introducing [facility] offers a voluntary care coordination program to provide high quality service and care to [facility] fee-for-service beneficiaries. Jeanette Farmer was designed to help you enhance your health and well-being through the following services: ? Transitions of Care  support for individuals who are transitioning from one care setting to another (example: Hospital to home). ? Chronic and Complex Care Coordination  support for individuals and caregivers of those with serious or chronic illnesses or with more than one chronic (ongoing) condition and those who take a number of different medications. If you meet specific medical criteria, a [address] [place] [address] Rd may call you directly to coordinate your care with your primary care physician and your other care providers. For questions about the [facility] programs, please, contact your physicians office. For general questions or additional information about Accountable Care Organizations: 
Please visit www.medicare.gov/acos. html or call 1-800-MEDICARE (2-954.672.6481) TTY users should call 6-391.639.5488. Introducing [facility]! [employer] introduces [employer] patient portal. Now you can access parts of your medical record, email your doctor's office, and request medication refills online. 1. In your internet browser, go to [email or web address] 2. Click on the First Time User? Click Here link in the Sign In box. You will see the New Member Sign Up page. 3. Enter your [employer] Access Code exactly as it appears below. You will not need to use this code after youve completed the sign-up process. If you do not sign up before the expiration date, you must request a new code. · [employer]sm Access Code: [email or web address] Expires: 10/7/2018  5:46 PM 
 
 4. Enter the last four digits of your Social Security Number (xxxx) and Date of Birth (mm/dd/yyyy) as indicated and click Submit. You will be taken to the next sign-up page. 5. Create a [employer] ID. This will be your [employer] login ID and cannot be changed, so think of one that is secure and easy to remember. 6. Create a [employer] password. You can change your password at any time. 7. Enter your Password Reset Question and Answer. This can be used at a later time if you forget your password. 8. Enter your e-mail address. You will receive e-mail notification when new information is available in [address] 9. Click Sign Up. You can now view and download portions of your medical record. 10. Click the Download Summary menu link to download a portable copy of your medical information. If you have questions, please visit the Frequently Asked Questions section of the [employer] website. Remember, [employer] is NOT to be used for urgent needs. For medical emergencies, dial 911. Now available from your iPhone and Android! Introducing Yinka Chaparro As a [facility] patient, I wanted to make you aware of our electronic visit tool called Yinka RecinosMebelrama. [facility] 24/7 allows you to connect within minutes with a medical provider 24 hours a day, seven days a week via a mobile device or tablet or logging into a secure website from your computer. You can access Yinka Chaparro from anywhere in the [place]. A virtual visit might be right for you when you have a simple condition and feel like you just dont want to get out of bed, or cant get away from work for an appointment, when your regular [facility] provider is not available (evenings, weekends or holidays), or when youre out of town and need minor care.   Electronic visits cost only $49 and if the Yinka Chaparro provider determines a prescription is needed to treat your condition, one can be electronically transmitted to a nearby pharmacy*. Please take a moment to enroll today if you have not already done so. The enrollment process is free and takes just a few minutes. To enroll, please download the [employer] 24/7 rod to your tablet or phone, or visit [email or web address] to enroll on your computer. And, as an [address] [place] [address] patient with a [employer] account, the results of your visits will be scanned into your electronic medical record and your primary care provider will be able to view the scanned results. We urge you to continue to see your regular [employer] provider for your ongoing medical care. And while your primary care provider may not be the one available when you seek a [employer] virtual visit, the peace of mind you get from getting a real diagnosis real time can be priceless. For more information on [employer], view our Frequently Asked Questions (FAQs) at [email or web address]. Sincerely, 
 
Juany Marie MD 
Chief Medical Officer [address] [place] [address] *:  certain medications cannot be prescribed via [employer] Providers Seen During Your Hospitalization Provider Specialty Primary office phone David Caldera MD [facility] [phone] Your Primary Care Physician (PCP) Primary Care Physician Office Phone Office Fax Khushi Bronson [phone] You are allergic to the following Allergen Reactions Ciprofloxacin Other (comments) Nauseated, felt fuzzy Erythromycin Unknown (comments) Pcn (Penicillins) Swelling Recent Documentation Smoking Status Former Smoker Emergency Contacts Name Discharge Info Relation Home Work Mobile [phone] CAREGIVER [3] Spouse [3] [phone] Patient Belongings The following personal items are in your possession at time of discharge: 
                             
 
  
  
 Please provide this summary of care documentation to your next provider. Signatures-by signing, you are acknowledging that this After Visit Summary has been reviewed with you and you have received a copy. Patient Signature:  ____________________________________________________________ Date:  ____________________________________________________________  
  
Mariel Goran Provider Signature:  ____________________________________________________________ Date:  ____________________________________________________________

## 2018-08-31 NOTE — DISCHARGE INSTRUCTIONS
[facility]  [phone]    Radiologist: Dr. Jung Adjutant    Date: 8/31/2018      Thoracentesis Discharge Instructions    You may have an aching pain in the puncture site tonight as the numbing medicine wears off. You may take Tylenol, as directed on the label, for pain or discomfort. Avoid ibuprofen (Advil, Motrin) and aspirin products for the next 48 hours as these drugs may increase your chance of bleeding. You have develop a mild cough as the lungs re expand with air, this should resolve with in the next 24 hours. Resume your previous diet and continue your prescribed medicaitons. Rest for the next 24 hours. If you experience shortness of breath (other than your normal breathing pattern) difficulty breathing, or have severe chest pain, call 911 and go to the nearest Emergency Room.     Be sure to follow up with your referring physician as soon as possible

## 2018-08-31 NOTE — PROGRESS NOTES
MD has read PCXR and per MD pt may be discharged. Pt resting comfortably on stretcher. Dressing to site D&I. No bleeding or hematoma noted to site. Pt taken to car by wheelchair and taken home by family. NAD noted at time of discharge.

## 2018-08-31 NOTE — PROGRESS NOTES
Name of procedure: Therapeutic Thoracentesis 1700 ml blood tinged fluid removed    Complications, if any, r/t procedure: none    VS : Stable     Pt tolerated procedure well. VSS. No C/O pain. Dressing to site D&I. No bleeding or hematoma noted to site. HOB elevated. PCXR ordered. NAD noted. Will monitor.

## 2018-09-05 ENCOUNTER — PATIENT OUTREACH (OUTPATIENT)
Dept: FAMILY MEDICINE CLINIC | Age: 83
End: 2018-09-05

## 2018-09-05 NOTE — PROGRESS NOTES
Patient is SOB however he just came in form being outside. Going to rest now. Has appointment set up and will attend. Goals  Advance care plan 8/27/18 deferred to next week outreach. HALLEY  Attends follow-up appointments as directed. 8/27/18 Patient states he believes it is time for him to have fluid taken off his lung. Request Dr. Jenn Calderon set up an appointment. Patient currently scheduled to see PCP on 8/30/18 moved to 8/28/18 at 0945 to evaluate need for thoracentesis since was not needed before discharge on 8/24/18. pulm scheduled for 9/6/18 and waiting to hear from onc for appointment date. Will monitor and report attendance at next week outreach. HALLEY  
 
9/5/18 Patient had thoracentesis on 8/31/18. Will be seeing Dr. Flores Esposito on 9/10/18 and Dr. Gricelda Perry on 9/11/18. Will monitor attendance and have patient report plan of care.  HALLEY

## 2018-09-11 ENCOUNTER — OFFICE VISIT (OUTPATIENT)
Dept: FAMILY MEDICINE CLINIC | Age: 83
End: 2018-09-11

## 2018-09-11 VITALS
SYSTOLIC BLOOD PRESSURE: 130 MMHG | RESPIRATION RATE: 14 BRPM | HEIGHT: 67 IN | TEMPERATURE: 96.9 F | OXYGEN SATURATION: 95 % | WEIGHT: 150 LBS | DIASTOLIC BLOOD PRESSURE: 60 MMHG | HEART RATE: 71 BPM | BODY MASS INDEX: 23.54 KG/M2

## 2018-09-11 DIAGNOSIS — C34.11 MALIGNANT NEOPLASM OF UPPER LOBE OF RIGHT LUNG (HCC): ICD-10-CM

## 2018-09-11 DIAGNOSIS — L08.9 TOE INFECTION: Primary | ICD-10-CM

## 2018-09-11 DIAGNOSIS — G63 POLYNEUROPATHY ASSOCIATED WITH UNDERLYING DISEASE (HCC): ICD-10-CM

## 2018-09-11 RX ORDER — CLINDAMYCIN HYDROCHLORIDE 300 MG/1
300 CAPSULE ORAL 3 TIMES DAILY
Qty: 21 CAP | Refills: 0 | Status: SHIPPED | OUTPATIENT
Start: 2018-09-11 | End: 2018-09-18

## 2018-09-11 RX ORDER — GABAPENTIN 300 MG/1
CAPSULE ORAL
Qty: 90 CAP | Refills: 5 | Status: SHIPPED | OUTPATIENT
Start: 2018-09-11

## 2018-09-11 NOTE — PROGRESS NOTES
HISTORY OF PRESENT ILLNESS  Saida Foster is a 80 y.o. male. HPI has started Tigresso one week ago. Has been having severe pain and numbness in both feet. NO real back pain  STubbed R 1st toe on a rug at home, has had some pain and redness around it. Not really painful, but cant feel it. Has also been having diarrhea, but only comes on once every few days. Breathing has been doing ok. Has been working some outdoors. ROS    Physical Exam   Constitutional: He appears well-developed and well-nourished. HENT:   Right Ear: External ear normal.   Left Ear: External ear normal.   Mouth/Throat: Oropharynx is clear and moist.   Neck: No thyromegaly present. Cardiovascular: Normal rate, regular rhythm, normal heart sounds and intact distal pulses. Pulmonary/Chest: Effort normal and breath sounds normal. No respiratory distress. He has no wheezes. Abdominal: Soft. Bowel sounds are normal. He exhibits no distension and no mass. There is no tenderness. There is no guarding. Musculoskeletal: Normal range of motion. He exhibits no edema. 2+ pulses bilaterally   Lymphadenopathy:     He has no cervical adenopathy. Neurological:   Absent sensation in both feet. Nursing note and vitals reviewed. ASSESSMENT and PLAN  Orders Placed This Encounter    clindamycin (CLEOCIN) 300 mg capsule    gabapentin (NEURONTIN) 300 mg capsule     Diagnoses and all orders for this visit:    1. Toe infection    2. Malignant neoplasm of upper lobe of right lung ([address]    3. Polyneuropathy associated with underlying disease ([address]    Other orders  -     clindamycin (CLEOCIN) 300 mg capsule; Take 1 Cap by mouth three (3) times daily for 7 days.  -     gabapentin (NEURONTIN) 300 mg capsule; One tab po qhs- 1st week. Then 1 tab po bid- 2nd week. Then 1 tab po tid.  For pain in feet      Follow-up Disposition: Not on File

## 2018-09-11 NOTE — MR AVS SNAPSHOT
Finn Nilda 
 
 
 [address] [facility] [address] [phone] 
[phone] Patient: Sera Barajas MRN: [id number] Visit Information Date & Time Provider Department Dept. Phone Encounter #  
 9/11/2018  8:45 AM Herberth Ibarra MD [facility] [phone] [id number] Your Appointments 12/4/2018  9:00 AM  
ROUTINE CARE with Herberth Ibarra MD  
[facility] Appt Note: 6m follow up  
 [address] [facility] [address] [id number] [phone] [address] [facility] [address] Upcoming Health Maintenance Date Due  
 EYE EXAM RETINAL OR DILATED Q1 6/8/2017 Pneumococcal 65+ High/Highest Risk (2 of 2 - PPSV23) 8/28/2017 HEMOGLOBIN A1C Q6M 4/18/2018 GLAUCOMA SCREENING Q2Y 6/8/2018 Influenza Age 5 to Adult 8/1/2018 FOOT EXAM Q1 10/18/2018 MICROALBUMIN Q1 10/18/2018 LIPID PANEL Q1 10/18/2018 MEDICARE YEARLY EXAM 10/19/2018 DTaP/Tdap/Td series (2 - Td) 5/16/2026 Allergies as of 9/11/2018  Review Complete On: 9/11/2018 By: Herberth Ibarra MD  
  
 Severity Noted Reaction Type Reactions Ciprofloxacin  04/18/2014   Side Effect Other (comments) Nauseated, felt fuzzy Erythromycin  04/30/2010    Unknown (comments) Pcn [Penicillins]  04/30/2010    Swelling Current Immunizations  Reviewed on 8/2/2016 Name Date Influenza High Dose Vaccine PF 10/18/2017, 9/12/2016, 11/30/2015, 10/13/2014 Influenza Vaccine 11/18/2013 Influenza Vaccine Split 10/31/2012, 10/17/2011, 10/29/2010 TD Vaccine 8/28/2012 ZZZ-RETIRED (DO NOT USE) Pneumococcal Vaccine (Unspecified Type) 8/28/2012 Not reviewed this visit You Were Diagnosed With   
  
 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 
 ICD-9-CM: 162.3 Polyneuropathy associated with underlying disease ([facility] [address]     ICD-10-CM: G30 ICD-9-CM: 357.4 Vitals BP Pulse Temp Resp Height(growth percentile) Weight(growth percentile) 130/60 71 96.9 °F (36.1 °C) (Oral) 14 5' 7\" (1.702 m) 150 lb (68 kg) SpO2 BMI Smoking Status 95% 23.49 kg/m2 Former Smoker Vitals History BMI and BSA Data Body Mass Index Body Surface Area  
 23.49 kg/m 2 1.79 m 2 Preferred Pharmacy Pharmacy Name Phone [facility] [address] [place] [address] Dr [place], [address] [phone] Your Updated Medication List  
  
   
This list is accurate as of 9/11/18  9:34 AM.  Always use your most recent med list.  
  
  
  
  
 albuterol 90 mcg/actuation inhaler Commonly known as:  PROVENTIL HFA, VENTOLIN HFA, PROAIR HFA Take 2 Puffs by inhalation every four (4) hours as needed for Shortness of Breath. allopurinol 300 mg tablet Commonly known as:  ZYLOPRIM  
TAKE ONE TABLET BY MOUTH ONCE DAILY TO PREVENT GOUT  
  
 ALPRAZolam 0.5 mg tablet Commonly known as:  XANAX  
TAKE ONE TABLET BY MOUTH TWICE DAILY AS NEEDED FOR ANXIETY  
  
 atenolol 50 mg tablet Commonly known as:  TENORMIN  
TAKE 1/2  TABLET BY MOUTH TWICE DAILY FOR BLOOD PRESSURE  
  
 cetirizine 10 mg tablet Commonly known as:  ZYRTEC Take 1 Tab by mouth daily. clindamycin 300 mg capsule Commonly known as:  CLEOCIN Take 1 Cap by mouth three (3) times daily for 7 days. finasteride 5 mg tablet Commonly known as:  PROSCAR Take 1 Tab by mouth daily. For prostate  
  
 fluticasone-vilanterol 100-25 mcg/dose inhaler Commonly known as:  BREO ELLIPTA Take 1 Puff by inhalation daily. gabapentin 300 mg capsule Commonly known as:  NEURONTIN One tab po qhs- 1st week. Then 1 tab po bid- 2nd week. Then 1 tab po tid. For pain in feet  
  
 hydroCHLOROthiazide 25 mg tablet Commonly known as:  HYDRODIURIL  
 TAKE ONE TABLET BY MOUTH ONCE DAILY losartan 25 mg tablet Commonly known as:  COZAAR Take 1 Tab by mouth daily. For blood pressure and kidneys  
  
 morphine IR 15 mg tablet Commonly known as:  MS IR  
1-2 tabs po every 4-6 hours as needed for pain MULTIPLE VITAMINS PO Take 1 Tab by mouth daily. pravastatin 20 mg tablet Commonly known as:  PRAVACHOL  
TAKE ONE TABLET BY MOUTH ONCE DAILY AT  NIGHT  FOR  CHOLESTEROL  
  
 PRESERVISION AREDS 2 PO Take  by mouth daily. promethazine-codeine 6.25-10 mg/5 mL syrup Commonly known as:  PHENERGAN with CODEINE Take 5 mL by mouth every six (6) hours as needed for Cough. Max Daily Amount: 20 mL. promethazine-dextromethorphan 6.25-15 mg/5 mL syrup Commonly known as:  PROMETHAZINE-DM Take 5 mL by mouth four (4) times daily as needed for Cough. terazosin 10 mg capsule Commonly known as:  HYTRIN  
TAKE ONE CAPSULE BY MOUTH ONCE DAILY FOR BLOOD PRESSURE AND PROSTATE  
  
 triazolam 0.25 mg tablet Commonly known as:  Glorya Bras Take 1 Tab by mouth nightly as needed. Max Daily Amount: 0.25 mg. VITAMIN D3 1,000 unit Cap Generic drug:  cholecalciferol Take 1 Tab by mouth daily. Prescriptions Printed Refills  
 clindamycin (CLEOCIN) 300 mg capsule 0 Sig: Take 1 Cap by mouth three (3) times daily for 7 days. Class: Print Route: Oral  
 gabapentin (NEURONTIN) 300 mg capsule 5 Sig: One tab po qhs- 1st week. Then 1 tab po bid- 2nd week. Then 1 tab po tid. For pain in feet Class: Print Introducing [facility]! Maritza Burr introduces [employer] patient portal. Now you can access parts of your medical record, email your doctor's office, and request medication refills online. 1. In your internet browser, go to [email or web address] 2. Click on the First Time User? Click Here link in the Sign In box. You will see the New Member Sign Up page. 3. Enter your [employer] Access Code exactly as it appears below. You will not need to use this code after youve completed the sign-up process. If you do not sign up before the expiration date, you must request a new code. · [employer] Access Code: [email or web address] Expires: 10/7/2018  5:46 PM 
 
4. Enter the last four digits of your Social Security Number (xxxx) and Date of Birth (mm/dd/yyyy) as indicated and click Submit. You will be taken to the next sign-up page. 5. Create a [employer] ID. This will be your [employer] login ID and cannot be changed, so think of one that is secure and easy to remember. 6. Create a [employer] password. You can change your password at any time. 7. Enter your Password Reset Question and Answer. This can be used at a later time if you forget your password. 8. Enter your e-mail address. You will receive e-mail notification when new information is available in [address] 9. Click Sign Up. You can now view and download portions of your medical record. 10. Click the Download Summary menu link to download a portable copy of your medical information. If you have questions, please visit the Frequently Asked Questions section of the [employer] website. Remember, [employer] is NOT to be used for urgent needs. For medical emergencies, dial 911. Now available from your iPhone and Android! Please provide this summary of care documentation to your next provider. Your primary care clinician is listed as Trino Rubin. If you have any questions after today's visit, please call [phone].

## 2018-09-11 NOTE — PROGRESS NOTES
Chief Complaint   Patient presents with    Lung Cancer     f/u    Weight Loss     1. Have you been to the ER, urgent care clinic since your last visit? Hospitalized since your last visit? No    2. Have you seen or consulted any other health care providers outside of the [address] [place] [address] since your last visit? Include any pap smears or colon screening.  No     Health Maintenance Due   Topic Date Due    EYE EXAM RETINAL OR DILATED Q1  06/08/2017    Pneumococcal 65+ High/Highest Risk (2 of 2 - PPSV23) 08/28/2017    HEMOGLOBIN A1C Q6M  04/18/2018    GLAUCOMA SCREENING Q2Y  06/08/2018    Influenza Age 9 to Adult  08/01/2018

## 2018-09-12 ENCOUNTER — PATIENT OUTREACH (OUTPATIENT)
Dept: FAMILY MEDICINE CLINIC | Age: 83
End: 2018-09-12
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
## 2018-09-18 ENCOUNTER — TELEPHONE (OUTPATIENT)
Dept: FAMILY MEDICINE CLINIC | Age: 83
End: 2018-09-18

## 2018-09-18 NOTE — TELEPHONE ENCOUNTER
Patient called stating he needs Dr. Reilly Jackson to call in to get his \"lungs read\"? Asked if that meant for a Radiology appt and he said \"yes. \"     Pt contact: [phone]

## 2018-09-19 DIAGNOSIS — J90 RECURRENT PLEURAL EFFUSION ON RIGHT: ICD-10-CM

## 2018-09-19 DIAGNOSIS — J90 PLEURAL EFFUSION: Primary | ICD-10-CM

## 2018-09-20 ENCOUNTER — PATIENT OUTREACH (OUTPATIENT)
Dept: FAMILY MEDICINE CLINIC | Age: 83
End: 2018-09-20

## 2018-09-21 ENCOUNTER — HOSPITAL ENCOUNTER (OUTPATIENT)
Dept: ULTRASOUND IMAGING | Age: 83
Discharge: HOME | End: 2018-09-21
Attending: FAMILY MEDICINE
Payer: MEDICARE

## 2018-09-21 ENCOUNTER — HOSPITAL ENCOUNTER (OUTPATIENT)
Dept: GENERAL RADIOLOGY | Age: 83
Discharge: HOME | End: 2018-09-21
Attending: INTERNAL MEDICINE
Payer: MEDICARE

## 2018-09-21 VITALS
HEART RATE: 71 BPM | OXYGEN SATURATION: 96 % | RESPIRATION RATE: 18 BRPM | SYSTOLIC BLOOD PRESSURE: 101 MMHG | HEIGHT: 67 IN | TEMPERATURE: 98.7 F | DIASTOLIC BLOOD PRESSURE: 49 MMHG

## 2018-09-21 DIAGNOSIS — J90 RECURRENT PLEURAL EFFUSION ON RIGHT: ICD-10-CM

## 2018-09-21 PROCEDURE — 71045 X-RAY EXAM CHEST 1 VIEW: CPT

## 2018-09-21 PROCEDURE — 32555 ASPIRATE PLEURA W/ IMAGING: CPT

## 2018-09-21 NOTE — IP AVS SNAPSHOT
[address] 
[phone] Patient: Susy Junior MRN: [id number] US:[id number] About your hospitalization You were admitted on:  September 21, 2018 You last received care in the:  [facility] Ultrasound You were discharged on:  September 21, 2018 Why you were hospitalized Your primary diagnosis was:  Not on File Follow-up Information None Your Scheduled Appointments Friday September 21, 2018  2:30 PM EDT  
Monse  with Andi Krishna MD, 308 [facility] 3 [facility] Ultrasound [address]) 200 [facility]  
[phone] GENERAL INSTRUCTIONS 1. Bring any non [facility] facility films/reports pertaining to the area being studied with you on the day of appointment. 2. Bring a list of all medications you are currently taking, including over the counter medications. 3. A written order with a valid diagnosis and Physicians signature is required for all scheduled tests. 4. Blood thinners and platelet inhibitors should be stopped 3-5 days prior to procedure. Consult your ordering physician prior to stopping them. 5. Check in at registration 30 minutes before your appointment time unless you were instructed to do otherwise. 6. A  is required for the procedure. For some patients, there may be some unsteadiness post procedure. If no  is present, the patient may have to remain in the department for a longer period of time. 7. The procedure may last 1-1 ½ hours. You may be required to stay 4-6 hours after the procedure for observation. --Lab work for bleeding times (INR, PT , PTT and platelets) should be be completed at least the day before the exam.  Without this information the patient is delayed or  rescheduled. Patient should report to outpatient registration ([facility]) 30 minutes prior to the appointment time unless instructed otherwise. Monday October 22, 2018  9:00 AM EDT ROUTINE CARE with Kwame Jovel MD  
[facility] [address] [facility] [address] [phone]  
[phone] Discharge Orders None A check jc indicates which time of day the medication should be taken. My Medications ASK your doctor about these medications Instructions Each Dose to Equal  
 Morning Noon Evening Bedtime  
 albuterol 90 mcg/actuation inhaler Commonly known as:  PROVENTIL HFA, VENTOLIN HFA, PROAIR HFA Your last dose was: Your next dose is: Take 2 Puffs by inhalation every four (4) hours as needed for Shortness of Breath. 2 Puff  
    
   
   
   
  
 allopurinol 300 mg tablet Commonly known as:  Reji Avendaño Your last dose was: Your next dose is: TAKE ONE TABLET BY MOUTH ONCE DAILY TO PREVENT GOUT  
     
   
   
   
  
 ALPRAZolam 0.5 mg tablet Commonly known as:  Irving Amend Your last dose was: Your next dose is: TAKE ONE TABLET BY MOUTH TWICE DAILY AS NEEDED FOR ANXIETY  
     
   
   
   
  
 atenolol 50 mg tablet Commonly known as:  TENORMIN Your last dose was: Your next dose is: TAKE 1/2  TABLET BY MOUTH TWICE DAILY FOR BLOOD PRESSURE  
     
   
   
   
  
 cetirizine 10 mg tablet Commonly known as:  ZYRTEC Your last dose was: Your next dose is: Take 1 Tab by mouth daily. 10 mg  
    
   
   
   
  
 finasteride 5 mg tablet Commonly known as:  PROSCAR Your last dose was: Your next dose is: Take 1 Tab by mouth daily. For prostate 5 mg  
    
   
   
   
  
 fluticasone-vilanterol 100-25 mcg/dose inhaler Commonly known as:  BREO ELLIPTA Your last dose was: Your next dose is: Take 1 Puff by inhalation daily. 1 Puff  
    
   
   
   
  
 gabapentin 300 mg capsule Commonly known as:  NEURONTIN Your last dose was: Your next dose is: One tab po qhs- 1st week. Then 1 tab po bid- 2nd week. Then 1 tab po tid. For pain in feet  
     
   
   
   
  
 hydroCHLOROthiazide 25 mg tablet Commonly known as:  HYDRODIURIL Your last dose was: Your next dose is: TAKE ONE TABLET BY MOUTH ONCE DAILY losartan 25 mg tablet Commonly known as:  COZAAR Your last dose was: Your next dose is: Take 1 Tab by mouth daily. For blood pressure and kidneys 25 mg  
    
   
   
   
  
 morphine IR 15 mg tablet Commonly known as:  MS IR Your last dose was: Your next dose is:    
   
   
 1-2 tabs po every 4-6 hours as needed for pain MULTIPLE VITAMINS PO Your last dose was: Your next dose is: Take 1 Tab by mouth daily. 1 Tab  
    
   
   
   
  
 pravastatin 20 mg tablet Commonly known as:  PRAVACHOL Your last dose was: Your next dose is: TAKE ONE TABLET BY MOUTH ONCE DAILY AT  NIGHT  FOR  CHOLESTEROL  
     
   
   
   
  
 PRESERVISION AREDS 2 PO Your last dose was: Your next dose is: Take  by mouth daily. promethazine-codeine 6.25-10 mg/5 mL syrup Commonly known as:  PHENERGAN with CODEINE Your last dose was: Your next dose is: Take 5 mL by mouth every six (6) hours as needed for Cough. Max Daily Amount: 20 mL. 1 tsp  
    
   
   
   
  
 promethazine-dextromethorphan 6.25-15 mg/5 mL syrup Commonly known as:  PROMETHAZINE-DM Your last dose was: Your next dose is: Take 5 mL by mouth four (4) times daily as needed for Cough. 5 mL terazosin 10 mg capsule Commonly known as:  HYTRIN Your last dose was: Your next dose is: TAKE ONE CAPSULE BY MOUTH ONCE DAILY FOR BLOOD PRESSURE AND PROSTATE  
     
   
   
   
  
 triazolam 0.25 mg tablet Commonly known as:  Reina Nelsona Your last dose was: Your next dose is: Take 1 Tab by mouth nightly as needed. Max Daily Amount: 0.25 mg.  
 0.25 mg  
    
   
   
   
  
 VITAMIN D3 1,000 unit Cap Generic drug:  cholecalciferol Your last dose was: Your next dose is: Take 1 Tab by mouth daily. 1 Tab Opioid Education Prescription Opioids: What You Need to Know: 
 
Prescription opioids can be used to help relieve moderate-to-severe pain and are often prescribed following a surgery or injury, or for certain health conditions. These medications can be an important part of treatment but also come with serious risks. Opioids are strong pain medicines. Examples include hydrocodone, oxycodone, fentanyl, and morphine. Heroin is an example of an illegal opioid. It is important to work with your health care provider to make sure you are getting the safest, most effective care. WHAT ARE THE RISKS AND SIDE EFFECTS OF OPIOID USE? Prescription opioids carry serious risks of addiction and overdose, especially with prolonged use. An opioid overdose, often marked by slow breathing, can cause sudden death. The use of prescription opioids can have a number of side effects as well, even when taken as directed. · Tolerance-meaning you might need to take more of a medication for the same pain relief · Physical dependence-meaning you have symptoms of withdrawal when the medication is stopped. Withdrawal symptoms can include nausea, sweating, chills, diarrhea, stomach cramps, and muscle aches.   Withdrawal can last up to several weeks, depending on which drug you took and how long you took it. · Increased sensitivity to pain · Constipation · Nausea, vomiting, and dry mouth · Sleepiness and dizziness · Confusion · Depression · Low levels of testosterone that can result in lower sex drive, energy, and strength · Itching and sweating RISKS ARE GREATER WITH:      
· History of drug misuse, substance use disorder, or overdose · Mental health conditions (such as depression or anxiety) · Sleep apnea · Older age (72 years or older) · Pregnancy Avoid alcohol while taking prescription opioids. Also, unless specifically advised by your health care provider, medications to avoid include: · Benzodiazepines (such as Xanax or Valium) · Muscle relaxants (such as Soma or Flexeril) · Hypnotics (such as Ambien or Lunesta) · Other prescription opioids KNOW YOUR OPTIONS Talk to your health care provider about ways to manage your pain that don't involve prescription opioids. Some of these options may actually work better and have fewer risks and side effects. Consult your physician before adding or stopping any medications, treatments, or physical activity. Options may include: 
· Pain relievers such as acetaminophen, ibuprofen, and naproxen · Some medications that are also used for depression or seizures · Physical therapy and exercise · Counseling to help patients learn how to cope better with triggers of pain and stress. · Application of heat or cold compress · Massage therapy · Relaxation techniques Be Informed Make sure you know the name of your medication, how much and how often to take it, and its potential risks & side effects. IF YOU ARE PRESCRIBED OPIOIDS FOR PAIN: 
· Never take opioids in greater amounts or more often than prescribed. Remember the goal is not to be pain-free but to manage your pain at a tolerable level. · Follow up with your primary care provider to: · Work together to create a plan on how to manage your pain. · Talk about ways to help manage your pain that don't involve prescription opioids. · Talk about any and all concerns and side effects. · Help prevent misuse and abuse. · Never sell or share prescription opioids · Help prevent misuse and abuse. · Store prescription opioids in a secure place and out of reach of others (this may include visitors, children, friends, and family). · Safely dispose of unused/unwanted prescription opioids: Find your community drug take-back program or your pharmacy mail-back program, or flush them down the toilet, following guidance from the Food and Drug Administration (www.fda.gov/Drugs/ResourcesForYou). · Visit www.cdc.gov/drugoverdose to learn about the risks of opioid abuse and overdose. · If you believe you may be struggling with addiction, tell your health care provider and ask for guidance or call Otto Clave at 2-126-677-CDOE. Dis[facility] Instructions [facility] Department Radiologist: MD Looney Dire Date: 9/21/2018 Thoracentesis Discharge Instructions You may have an aching pain in the puncture site tonight. Take Tylenol, as directed on the label, for pain or discomfort. Avoid ibuprofen (Advil, Motrin) and aspirin products for the next 48 hours as these drugs may cause you to bleed. Resume your previous diet and follow the medication reconciliation form. Rest for the next 24 hours. If you experience shortness of breath (other than your normal breathing pattern) difficulty breathing, or have severe chest pain, call 911 and go to the nearest Emergency Room. Be sure to follow up with your referring physician as soon as possible ACO Transitions of Care Introducing [employer] [address] offers a voluntary care coordination program to provide high quality service and care to [facility] fee-for-service beneficiaries. Tish Rodriguez was designed to help you enhance your health and well-being through the following services: ? Transitions of Care  support for individuals who are transitioning from one care setting to another (example: Hospital to home). ? Chronic and Complex Care Coordination  support for individuals and caregivers of those with serious or chronic illnesses or with more than one chronic (ongoing) condition and those who take a number of different medications. If you meet specific medical criteria, a [facility] [address] may call you directly to coordinate your care with your primary care physician and your other care providers. For questions about the [facility] programs, please, contact your physicians office. For general questions or additional information about Accountable Care Organizations: 
Please visit www.medicare.gov/acos. html or call 1-800-MEDICARE (9-970.756.9289) TTY users should call 3-442.953.9932. Introducing [facility]! Herminio Alcaraz introduces [employer] patient portal. Now you can access parts of your medical record, email your doctor's office, and request medication refills online. 1. In your internet browser, go to [email or web address] 2. Click on the First Time User? Click Here link in the Sign In box. You will see the New Member Sign Up page. 3. Enter your [employer] Access Code exactly as it appears below. You will not need to use this code after youve completed the sign-up process. If you do not sign up before the expiration date, you must request a new code. · [employer] Access Code: [email or web address] Expires: 10/7/2018  5:46 PM 
 
4. Enter the last four digits of your Social Security Number (xxxx) and Date of Birth (mm/dd/yyyy) as indicated and click Submit. You will be taken to the next sign-up page. 5. Create a [employer] ID. This will be your [employer] login ID and cannot be changed, so think of one that is secure and easy to remember. 6. Create a [employer] password. You can change your password at any time. 7. Enter your Password Reset Question and Answer. This can be used at a later time if you forget your password. 8. Enter your e-mail address. You will receive e-mail notification when new information is available in [facility] 9. Click Sign Up. You can now view and download portions of your medical record. 10. Click the Download Summary menu link to download a portable copy of your medical information. If you have questions, please visit the Frequently Asked Questions section of the [employer] website. Remember, [employer] is NOT to be used for urgent needs. For medical emergencies, dial 911. Now available from your iPhone and Android! Introducing Yinka Chaparro As a [employer] patient, I wanted to make you aware of our electronic visit tool called Yinka Chaparro. [employer] 24/7 allows you to connect within minutes with a medical provider 24 hours a day, seven days a week via a mobile device or tablet or logging into a secure website from your computer. You can access Yinka Chaparro from anywhere in the [place]. A virtual visit might be right for you when you have a simple condition and feel like you just dont want to get out of bed, or cant get away from work for an appointment, when your regular [employer] provider is not available (evenings, weekends or holidays), or when youre out of town and need minor care. Electronic visits cost only $49 and if the [employer] 24/7 provider determines a prescription is needed to treat your condition, one can be electronically transmitted to a nearby pharmacy*. Please take a moment to enroll today if you have not already done so. The enrollment process is free and takes just a few minutes.   To enroll, please download the [employer] 24/7 rod to your tablet or phone, or visit [email or web address] to enroll on your computer. And, as an [address] [place] [address] patient with a [employer] account, the results of your visits will be scanned into your electronic medical record and your primary care provider will be able to view the scanned results. We urge you to continue to see your regular [employer] provider for your ongoing medical care. And while your primary care provider may not be the one available when you seek a [employer] virtual visit, the peace of mind you get from getting a real diagnosis real time can be priceless. For more information on [employer], view our Frequently Asked Questions (FAQs) at [email or web address]. Sincerely, 
 
Alicja Starkey MD 
Chief Medical Officer [address] *:  certain medications cannot be prescribed via [employer] Unresulted Labs-Please follow up with your PCP about these lab tests Order Current Status [address] [facility] In process Providers Seen During Your Hospitalization Provider Specialty Primary office phone Enrrique Alston MD [facility] [phone] Your Primary Care Physician (PCP) Primary Care Physician Office Phone Office Fax Anna Lyon [phone] You are allergic to the following Allergen Reactions Ciprofloxacin Other (comments) Nauseated, felt fuzzy Erythromycin Unknown (comments) Pcn (Penicillins) Swelling Recent Documentation Height Smoking Status 1.702 m Former Smoker Emergency Contacts Name Discharge Info Relation Home Work Mobile [address] CAREGIVER [3] Spouse [3] [phone] Patient Belongings The following personal items are in your possession at time of discharge: 
     Visual Aid: None Please provide this summary of care documentation to your next provider. Signatures-by signing, you are acknowledging that this After Visit Summary has been reviewed with you and you have received a copy. Patient Signature:  ____________________________________________________________ Date:  ____________________________________________________________  
  
Amara Saenz Provider Signature:  ____________________________________________________________ Date:  ____________________________________________________________

## 2018-09-21 NOTE — DISCHARGE INSTRUCTIONS
[address]  Special Procedures/Radiology Department    Radiologist: MD Cory Larson    Date: 9/21/2018      Thoracentesis Discharge Instructions    You may have an aching pain in the puncture site tonight. Take Tylenol, as directed on the label, for pain or discomfort. Avoid ibuprofen (Advil, Motrin) and aspirin products for the next 48 hours as these drugs may cause you to bleed. Resume your previous diet and follow the medication reconciliation form. Rest for the next 24 hours. If you experience shortness of breath (other than your normal breathing pattern) difficulty breathing, or have severe chest pain, call 911 and go to the nearest Emergency Room.     Be sure to follow up with your referring physician as soon as possible

## 2018-09-27 ENCOUNTER — OFFICE VISIT (OUTPATIENT)
Dept: FAMILY MEDICINE CLINIC | Age: 83
End: 2018-09-27

## 2018-09-27 VITALS
SYSTOLIC BLOOD PRESSURE: 138 MMHG | TEMPERATURE: 95.6 F | OXYGEN SATURATION: 96 % | HEIGHT: 67 IN | RESPIRATION RATE: 18 BRPM | HEART RATE: 86 BPM | DIASTOLIC BLOOD PRESSURE: 85 MMHG | BODY MASS INDEX: 23.39 KG/M2 | WEIGHT: 149 LBS

## 2018-09-27 DIAGNOSIS — R07.89 OTHER CHEST PAIN: ICD-10-CM

## 2018-09-27 DIAGNOSIS — C79.51 BONY METASTASIS (HCC): Primary | ICD-10-CM

## 2018-09-27 DIAGNOSIS — C34.11 MALIGNANT NEOPLASM OF UPPER LOBE OF RIGHT LUNG (HCC): ICD-10-CM

## 2018-09-27 RX ORDER — MORPHINE SULFATE 15 MG/1
TABLET ORAL
Qty: 60 TAB | Refills: 0 | Status: SHIPPED | OUTPATIENT
Start: 2018-09-27 | End: 2018-11-26 | Stop reason: SDUPTHER

## 2018-09-27 NOTE — PROGRESS NOTES
HISTORY OF PRESENT ILLNESS  Otilia Mulligan is a 80 y.o. male. HPI continues to have dyspnea on exertion, and pain in lower sternal area, worse when gets up to do anything. Dyspnea is relieved by rest. No shortness of breath at rest. Dr. Angela English told him to not lift more than 5 lbs. Gets short of breath walking to car. Had 1500 ccs fluid removed last Friday during thoracentesis, this didn't help as much as usual. Takes his cancer pill once daily at home, has been on it for 31 days. Doesn't feel better or worse. Has also had a pain in L distal femur area    ROS    Physical Exam   Constitutional: He appears well-developed and well-nourished. HENT:   Right Ear: External ear normal.   Left Ear: External ear normal.   Mouth/Throat: Oropharynx is clear and moist.   Neck: No thyromegaly present. Cardiovascular: Normal rate, regular rhythm, normal heart sounds and intact distal pulses. Pulmonary/Chest: Effort normal and breath sounds normal. No respiratory distress. He has no wheezes. Decreased breath sounds on Right   Abdominal: Soft. Bowel sounds are normal. He exhibits no distension and no mass. There is no tenderness. There is no guarding. Musculoskeletal: Normal range of motion. He exhibits no edema. Lymphadenopathy:     He has no cervical adenopathy. Nursing note and vitals reviewed. ASSESSMENT and PLAN  Orders Placed This Encounter    morphine IR (MS IR) 15 mg tablet     Si tabs po every 4-6 hours as needed for pain     Dispense:  60 Tab     Refill:  0     Orders Placed This Encounter    morphine IR (MS IR) 15 mg tablet     Diagnoses and all orders for this visit:    1. Bony metastasis ([address]    2. Other chest pain  -     morphine IR (MS IR) 15 mg tablet; 2 tabs po every 4-6 hours as needed for pain    3.  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

## 2018-09-27 NOTE — PROGRESS NOTES
Chief Complaint   Patient presents with    Shortness of Breath    Fatigue     1. Have you been to the ER, urgent care clinic since your last visit? Hospitalized since your last visit? No    2. Have you seen or consulted any other health care providers outside of the [address] [place] [address] since your last visit? Include any pap smears or colon screening.  No     Health Maintenance Due   Topic Date Due    Shingrix Vaccine Age 49> (1 of 2) 07/22/1983    EYE EXAM RETINAL OR DILATED Q1  06/08/2017    Pneumococcal 65+ High/Highest Risk (2 of 2 - PPSV23) 08/28/2017    HEMOGLOBIN A1C Q6M  04/18/2018    GLAUCOMA SCREENING Q2Y  06/08/2018    Influenza Age 9 to Adult  08/01/2018    FOOT EXAM Q1  10/18/2018    MICROALBUMIN Q1  10/18/2018    LIPID PANEL Q1  10/18/2018

## 2018-10-04 ENCOUNTER — OFFICE VISIT (OUTPATIENT)
Dept: FAMILY MEDICINE CLINIC | Age: 83
End: 2018-10-04

## 2018-10-04 VITALS
SYSTOLIC BLOOD PRESSURE: 106 MMHG | BODY MASS INDEX: 23.32 KG/M2 | HEIGHT: 67 IN | RESPIRATION RATE: 16 BRPM | WEIGHT: 148.6 LBS | OXYGEN SATURATION: 92 % | HEART RATE: 81 BPM | TEMPERATURE: 96.2 F | DIASTOLIC BLOOD PRESSURE: 60 MMHG

## 2018-10-04 DIAGNOSIS — C34.11 MALIGNANT NEOPLASM OF UPPER LOBE OF RIGHT LUNG (HCC): ICD-10-CM

## 2018-10-04 DIAGNOSIS — Z23 ENCOUNTER FOR IMMUNIZATION: Primary | ICD-10-CM

## 2018-10-04 DIAGNOSIS — R07.89 OTHER CHEST PAIN: ICD-10-CM

## 2018-10-04 PROBLEM — E11.40 TYPE 2 DIABETES MELLITUS WITH DIABETIC NEUROPATHY (HCC): Status: ACTIVE | Noted: 2018-10-04

## 2018-10-04 RX ORDER — PIROXICAM 20 MG/1
20 CAPSULE ORAL DAILY
Qty: 30 CAP | Refills: 5 | Status: SHIPPED | OUTPATIENT
Start: 2018-10-04

## 2018-10-04 RX ORDER — HYDROCHLOROTHIAZIDE 12.5 MG/1
12.5 TABLET ORAL DAILY
Qty: 30 TAB | Refills: 12 | Status: SHIPPED | OUTPATIENT
Start: 2018-10-04 | End: 2018-12-04 | Stop reason: ALTCHOICE

## 2018-10-04 NOTE — MR AVS SNAPSHOT
[address] [facility] [address] 
 
 
 [address] [facility] [address] [address] 
[phone] Patient: Matthew Schaeffer MRN: [id number] Visit Information Date & Time Provider Department Dept. Phone Encounter #  
 10/4/2018  9:45 AM Jose Zamudio MD [facility] [phone] [id number] Your Appointments 10/22/2018  9:00 AM  
ROUTINE CARE with Jose Zamudio MD  
[address]) Appt Note: 4w follow up  
 [address] [facility] [address] [address]  
[phone] [id number]  
  
    
 12/4/2018  9:00 AM  
ROUTINE CARE with Jose Zamudio MD  
[address]) Appt Note: 6m follow up  
 [address] [facility] [address] [id number] [phone] Upcoming Health Maintenance Date Due Shingrix Vaccine Age 50> (1 of 2) 7/22/1983 EYE EXAM RETINAL OR DILATED Q1 6/8/2017 Pneumococcal 65+ High/Highest Risk (2 of 2 - PPSV23) 8/28/2017 HEMOGLOBIN A1C Q6M 4/18/2018 GLAUCOMA SCREENING Q2Y 6/8/2018 Influenza Age 5 to Adult 8/1/2018 FOOT EXAM Q1 10/18/2018 MICROALBUMIN Q1 10/18/2018 LIPID PANEL Q1 10/18/2018 MEDICARE YEARLY EXAM 10/19/2018 DTaP/Tdap/Td series (2 - Td) 5/16/2026 Allergies as of 10/4/2018  Review Complete On: 10/4/2018 By: Samira Gracia, LPN Severity Noted Reaction Type Reactions Ciprofloxacin  04/18/2014   Side Effect Other (comments) Nauseated, felt fuzzy Erythromycin  04/30/2010    Unknown (comments) Pcn [Penicillins]  04/30/2010    Swelling Current Immunizations  Reviewed on 8/2/2016 Name Date Influenza High Dose Vaccine PF  Incomplete, 10/18/2017, 9/12/2016, 11/30/2015, 10/13/2014 Influenza Vaccine 11/18/2013 Influenza Vaccine Split 10/31/2012, 10/17/2011, 10/29/2010 Pneumococcal Conjugate (PCV-13)  Incomplete TD Vaccine 8/28/2012 ZZZ-RETIRED (DO NOT USE) Pneumococcal Vaccine (Unspecified Type) 8/28/2012 Not reviewed this visit You Were Diagnosed With   
  
 Codes Comments Encounter for immunization    -  Primary ICD-10-CM: O20 ICD-9-CM: V03.89 Other chest pain     ICD-10-CM: R07.89 ICD-9-CM: 786.59 Malignant neoplasm of upper lobe of right lung [facility])     ICD-10-CM: C34.11 ICD-9-CM: 162.3 Vitals BP Pulse Temp Resp Height(growth percentile) Weight(growth percentile) 106/60 81 96.2 °F (35.7 °C) (Oral) 16 5' 7\" (1.702 m) 148 lb 9.6 oz (67.4 kg) SpO2 BMI Smoking Status 92% 23.27 kg/m2 Former Smoker Vitals History BMI and BSA Data Body Mass Index Body Surface Area  
 23.27 kg/m 2 1.78 m 2 Preferred Pharmacy Pharmacy Name Phone [facility] [address] [place] [address] [phone] Your Updated Medication List  
  
   
This list is accurate as of 10/4/18 10:26 AM.  Always use your most recent med list.  
  
  
  
  
 albuterol 90 mcg/actuation inhaler Commonly known as:  PROVENTIL HFA, VENTOLIN HFA, PROAIR HFA Take 2 Puffs by inhalation every four (4) hours as needed for Shortness of Breath. allopurinol 300 mg tablet Commonly known as:  ZYLOPRIM  
TAKE ONE TABLET BY MOUTH ONCE DAILY TO PREVENT GOUT  
  
 ALPRAZolam 0.5 mg tablet Commonly known as:  XANAX  
TAKE ONE TABLET BY MOUTH TWICE DAILY AS NEEDED FOR ANXIETY  
  
 atenolol 50 mg tablet Commonly known as:  TENORMIN  
TAKE 1/2  TABLET BY MOUTH TWICE DAILY FOR BLOOD PRESSURE  
  
 cetirizine 10 mg tablet Commonly known as:  ZYRTEC Take 1 Tab by mouth daily. finasteride 5 mg tablet Commonly known as:  PROSCAR Take 1 Tab by mouth daily. For prostate  
  
 fluticasone-vilanterol 100-25 mcg/dose inhaler Commonly known as:  BREO ELLIPTA Take 1 Puff by inhalation daily. gabapentin 300 mg capsule Commonly known as:  NEURONTIN One tab po qhs- 1st week. Then 1 tab po bid- 2nd week. Then 1 tab po tid. For pain in feet  
  
 hydroCHLOROthiazide 12.5 mg tablet Commonly known as:  HYDRODIURIL Take 1 Tab by mouth daily. For blood pressure  
  
 morphine IR 15 mg tablet Commonly known as:  MS IR  
2 tabs po every 4-6 hours as needed for pain MULTIPLE VITAMINS PO Take 1 Tab by mouth daily. piroxicam 20 mg capsule Commonly known as:  Viveca Nick Take 1 Cap by mouth daily. For pain  
  
 pravastatin 20 mg tablet Commonly known as:  PRAVACHOL  
TAKE ONE TABLET BY MOUTH ONCE DAILY AT  NIGHT  FOR  CHOLESTEROL  
  
 PRESERVISION AREDS 2 PO Take  by mouth daily. promethazine-codeine 6.25-10 mg/5 mL syrup Commonly known as:  PHENERGAN with CODEINE Take 5 mL by mouth every six (6) hours as needed for Cough. Max Daily Amount: 20 mL. promethazine-dextromethorphan 6.25-15 mg/5 mL syrup Commonly known as:  PROMETHAZINE-DM Take 5 mL by mouth four (4) times daily as needed for Cough. terazosin 10 mg capsule Commonly known as:  HYTRIN  
TAKE ONE CAPSULE BY MOUTH ONCE DAILY FOR BLOOD PRESSURE AND PROSTATE  
  
 triazolam 0.25 mg tablet Commonly known as:  Neftali Junior Take 1 Tab by mouth nightly as needed. Max Daily Amount: 0.25 mg. VITAMIN D3 1,000 unit Cap Generic drug:  cholecalciferol Take 1 Tab by mouth daily. Prescriptions Printed Refills  
 piroxicam (FELDENE) 20 mg capsule 5 Sig: Take 1 Cap by mouth daily. For pain  
 Class: Print Route: Oral  
 hydroCHLOROthiazide (HYDRODIURIL) 12.5 mg tablet 12 Sig: Take 1 Tab by mouth daily. For blood pressure Class: Print Route: Oral  
  
We Performed the Following INFLUENZA VIRUS VACCINE, HIGH DOSE SEASONAL, PRESERVATIVE FREE [28969 CPT(R)] PNEUMOCOCCAL CONJ VACCINE 13 VALENT IM [id number] CPT(R)] Introducing [facility]! [facility] introduces LibriLoop patient portal. Now you can access parts of your medical record, email your doctor's office, and request medication refills online. 1. In your internet browser, go to [email or web address] 2. Click on the First Time User? Click Here link in the Sign In box. You will see the New Member Sign Up page. 3. Enter your LibriLoop Access Code exactly as it appears below. You will not need to use this code after youve completed the sign-up process. If you do not sign up before the expiration date, you must request a new code. · LibriLoop Access Code: [email or web address] Expires: 10/7/2018  5:46 PM 
 
4. Enter the last four digits of your Social Security Number (xxxx) and Date of Birth (mm/dd/yyyy) as indicated and click Submit. You will be taken to the next sign-up page. 5. Create a LibriLoop ID. This will be your LibriLoop login ID and cannot be changed, so think of one that is secure and easy to remember. 6. Create a LibriLoop password. You can change your password at any time. 7. Enter your Password Reset Question and Answer. This can be used at a later time if you forget your password. 8. Enter your e-mail address. You will receive e-mail notification when new information is available in [address] 9. Click Sign Up. You can now view and download portions of your medical record. 10. Click the Download Summary menu link to download a portable copy of your medical information. If you have questions, please visit the Frequently Asked Questions section of the LibriLoop website. Remember, LibriLoop is NOT to be used for urgent needs. For medical emergencies, dial 911. Now available from your iPhone and Android! Please provide this summary of care documentation to your next provider. Your primary care clinician is listed as Maci Davalos. If you have any questions after today's visit, please call [phone].

## 2018-10-04 NOTE — PATIENT INSTRUCTIONS
Vaccine Information Statement    Influenza (Flu) Vaccine (Inactivated or Recombinant): What you need to know    Many Vaccine Information Statements are available in Yi and other languages. See www.immunize.org/vis  Hojas de Información Sobre Vacunas están disponibles en Español y en muchos otros idiomas. Visite www.immunize.org/vis    1. Why get vaccinated? Influenza (flu) is a contagious disease that spreads around the [place] every year, usually between October and May. Flu is caused by influenza viruses, and is spread mainly by coughing, sneezing, and close contact. Anyone can get flu. Flu strikes suddenly and can last several days. Symptoms vary by age, but can include:   fever/chills   sore throat   muscle aches   fatigue   cough   headache    runny or stuffy nose    Flu can also lead to pneumonia and blood infections, and cause diarrhea and seizures in children. If you have a medical condition, such as heart or lung disease, flu can make it worse. Flu is more dangerous for some people. Infants and young children, people 72years of age and older, pregnant women, and people with certain health conditions or a weakened immune system are at greatest risk. Each year thousands of people in the [facility] die from flu, and many more are hospitalized. Flu vaccine can:   keep you from getting flu,   make flu less severe if you do get it, and   keep you from spreading flu to your family and other people. 2. Inactivated and recombinant flu vaccines    A dose of flu vaccine is recommended every flu season. Children 6 months through 6years of age may need two doses during the same flu season. Everyone else needs only one dose each flu season.        Some inactivated flu vaccines contain a very small amount of a mercury-based preservative called thimerosal. Studies have not shown thimerosal in vaccines to be harmful, but flu vaccines that do not contain thimerosal are available. There is no live flu virus in flu shots. They cannot cause the flu. There are many flu viruses, and they are always changing. Each year a new flu vaccine is made to protect against three or four viruses that are likely to cause disease in the upcoming flu season. But even when the vaccine doesnt exactly match these viruses, it may still provide some protection    Flu vaccine cannot prevent:   flu that is caused by a virus not covered by the vaccine, or   illnesses that look like flu but are not. It takes about 2 weeks for protection to develop after vaccination, and protection lasts through the flu season. 3. Some people should not get this vaccine    Tell the person who is giving you the vaccine:     If you have any severe, life-threatening allergies. If you ever had a life-threatening allergic reaction after a dose of flu vaccine, or have a severe allergy to any part of this vaccine, you may be advised not to get vaccinated. Most, but not all, types of flu vaccine contain a small amount of egg protein.  If you ever had Guillain-Barré Syndrome (also called GBS). Some people with a history of GBS should not get this vaccine. This should be discussed with your doctor.  If you are not feeling well. It is usually okay to get flu vaccine when you have a mild illness, but you might be asked to come back when you feel better. 4. Risks of a vaccine reaction    With any medicine, including vaccines, there is a chance of reactions. These are usually mild and go away on their own, but serious reactions are also possible. Most people who get a flu shot do not have any problems with it.      Minor problems following a flu shot include:    soreness, redness, or swelling where the shot was given     hoarseness   sore, red or itchy eyes   cough   fever   aches   headache   itching   fatigue  If these problems occur, they usually begin soon after the shot and last 1 or 2 days. More serious problems following a flu shot can include the following:     There may be a small increased risk of Guillain-Barré Syndrome (GBS) after inactivated flu vaccine. This risk has been estimated at 1 or 2 additional cases per million people vaccinated. This is much lower than the risk of severe complications from flu, which can be prevented by flu vaccine.  Young children who get the flu shot along with pneumococcal vaccine (PCV13) and/or DTaP vaccine at the same time might be slightly more likely to have a seizure caused by fever. Ask your doctor for more information. Tell your doctor if a child who is getting flu vaccine has ever had a seizure. Problems that could happen after any injected vaccine:      People sometimes faint after a medical procedure, including vaccination. Sitting or lying down for about 15 minutes can help prevent fainting, and injuries caused by a fall. Tell your doctor if you feel dizzy, or have vision changes or ringing in the ears.  Some people get severe pain in the shoulder and have difficulty moving the arm where a shot was given. This happens very rarely.  Any medication can cause a severe allergic reaction. Such reactions from a vaccine are very rare, estimated at about 1 in a million doses, and would happen within a few minutes to a few hours after the vaccination. As with any medicine, there is a very remote chance of a vaccine causing a serious injury or death. The safety of vaccines is always being monitored. For more information, visit: www.cdc.gov/vaccinesafety/    5. What if there is a serious reaction? What should I look for?  Look for anything that concerns you, such as signs of a severe allergic reaction, very high fever, or unusual behavior.     Signs of a severe allergic reaction can include hives, swelling of the face and throat, difficulty breathing, a fast heartbeat, dizziness, and weakness - usually within a few minutes to a few hours after the vaccination. What should I do?  If you think it is a severe allergic reaction or other emergency that cant wait, call 9-1-1 and get the person to the nearest hospital. Otherwise, call your doctor.  Reactions should be reported to the Vaccine Adverse Event Reporting System (VAERS). Your doctor should file this report, or you can do it yourself through  the VAERS web site at www.vaers. [facility].gov, or by calling 0-751.519.5738. VAERS does not give medical advice. 6. The National Vaccine Injury Compensation Program    The [facility] Vaccine Injury Compensation Program (VICP) is a federal program that was created to compensate people who may have been injured by certain vaccines. Persons who believe they may have been injured by a vaccine can learn about the program and about filing a claim by calling 7-781.521.9972 or visiting the [employer] website at www.[facility].gov/vaccinecompensation. There is a time limit to file a claim for compensation. 7. How can I learn more?  Ask your healthcare provider. He or she can give you the vaccine package insert or suggest other sources of information.  Call your local or state health department.  Contact the Centers for Disease Control and Prevention (CDC):  - Call 7-796.949.6027 (1-800-CDC-INFO) or  - Visit CDCs website at www.cdc.gov/flu    Vaccine Information Statement   Inactivated Influenza Vaccine   8/7/2015  [address] [id number]    Department of Health and Human Services  Centers for Disease Control and Prevention    Office Use Only

## 2018-10-04 NOTE — PROGRESS NOTES
HISTORY OF PRESENT ILLNESS  Keagan Calderon is a 80 y.o. male. HPI In for recheck. Still gets L sided chest pain when stands up. Has been taking 1-2 morphine IR 15 mg- sometimes it helps, sometimes it doesn't. Has had some swellimg in feet. No further hemoptysis. Cough is actually doing better. Some dyspnea on exertion. Not able to do very much activity before becomes short of breath. No real back pain. ROS    Physical Exam   Constitutional: He appears well-developed and well-nourished. HENT:   Right Ear: External ear normal.   Left Ear: External ear normal.   Mouth/Throat: Oropharynx is clear and moist.   Neck: No thyromegaly present. Cardiovascular: Normal rate, regular rhythm, normal heart sounds and intact distal pulses. Pulmonary/Chest: Effort normal and breath sounds normal. No respiratory distress. He has no wheezes. Decreased breath sounds R base   Abdominal: Soft. Bowel sounds are normal. He exhibits no distension and no mass. There is no tenderness. There is no guarding. Musculoskeletal: Normal range of motion. He exhibits no edema. Lymphadenopathy:     He has no cervical adenopathy. Nursing note and vitals reviewed. ASSESSMENT and PLAN  Orders Placed This Encounter    Pneumococcal conjugate 13 valent, IM (PREVNAR-13)    Influenza Vaccine Inactivated (IIV) (FLUAD), Subunit, Adjuvanted, IM (53806)    WA IMMUNIZ ADMIN,1 SINGLE/COMB VAC/TOXOID    piroxicam (FELDENE) 20 mg capsule     Sig: Take 1 Cap by mouth daily. For pain     Dispense:  30 Cap     Refill:  5    hydroCHLOROthiazide (HYDRODIURIL) 12.5 mg tablet     Sig: Take 1 Tab by mouth daily.  For blood pressure     Dispense:  30 Tab     Refill:  12     Orders Placed This Encounter    Pneumococcal conjugate 13 valent, IM (PREVNAR-13)    Influenza Vaccine Inactivated (IIV) (FLUAD), Subunit, Adjuvanted, IM (02607)    WA IMMUNIZ ADMIN,1 SINGLE/COMB VAC/TOXOID    piroxicam (FELDENE) 20 mg capsule    hydroCHLOROthiazide (HYDRODIURIL) 12.5 mg tablet     Diagnoses and all orders for this visit:    1. Encounter for immunization  -     Pneumococcal conjugate 13 valent, IM (PREVNAR-13)  -     Influenza Vaccine Inactivated (IIV) (FLUAD), Subunit, Adjuvanted, IM ([id number])  -     CA IMMUNIZ ADMIN,1 SINGLE/COMB VAC/TOXOID    2. Other chest pain    3. Malignant neoplasm of upper lobe of right lung ([facility] [address]    Other orders  -     piroxicam (FELDENE) 20 mg capsule; Take 1 Cap by mouth daily. For pain  -     hydroCHLOROthiazide (HYDRODIURIL) 12.5 mg tablet; Take 1 Tab by mouth daily.  For blood pressure

## 2018-10-04 NOTE — ACP (ADVANCE CARE PLANNING)
Chief Complaint   Patient presents with    Shortness of Breath    Extremity Weakness     pt feels unsteady on feet/ankles    Foot Pain     feel bilateral     1. Have you been to the ER, urgent care clinic since your last visit? Hospitalized since your last visit? No    2. Have you seen or consulted any other health care providers outside of the [address] [place] [address] since your last visit? Include any pap smears or colon screening.  No     Health Maintenance Due   Topic Date Due    Shingrix Vaccine Age 49> (1 of 2) 07/22/1983    EYE EXAM RETINAL OR DILATED Q1  06/08/2017    Pneumococcal 65+ High/Highest Risk (2 of 2 - PPSV23) 08/28/2017    HEMOGLOBIN A1C Q6M  04/18/2018    GLAUCOMA SCREENING Q2Y  06/08/2018    Influenza Age 9 to Adult  08/01/2018    FOOT EXAM Q1  10/18/2018    MICROALBUMIN Q1  10/18/2018    LIPID PANEL Q1  10/18/2018

## 2018-10-16 ENCOUNTER — HOSPITAL ENCOUNTER (OUTPATIENT)
Dept: GENERAL RADIOLOGY | Age: 83
Discharge: HOME | End: 2018-10-16
Attending: RADIOLOGY
Payer: MEDICARE

## 2018-10-16 ENCOUNTER — HOSPITAL ENCOUNTER (OUTPATIENT)
Dept: ULTRASOUND IMAGING | Age: 83
Discharge: HOME | End: 2018-10-16
Attending: INTERNAL MEDICINE
Payer: MEDICARE

## 2018-10-16 VITALS
SYSTOLIC BLOOD PRESSURE: 101 MMHG | WEIGHT: 150 LBS | HEART RATE: 73 BPM | RESPIRATION RATE: 16 BRPM | DIASTOLIC BLOOD PRESSURE: 42 MMHG | OXYGEN SATURATION: 98 % | HEIGHT: 67 IN | BODY MASS INDEX: 23.54 KG/M2

## 2018-10-16 DIAGNOSIS — J91.0 MALIGNANT PLEURAL EFFUSION: ICD-10-CM

## 2018-10-16 PROCEDURE — 32555 ASPIRATE PLEURA W/ IMAGING: CPT

## 2018-10-16 PROCEDURE — 74011250636 HC RX REV CODE- 250/636: Performed by: RADIOLOGY

## 2018-10-16 PROCEDURE — 71045 X-RAY EXAM CHEST 1 VIEW: CPT

## 2018-10-16 RX ORDER — LIDOCAINE HYDROCHLORIDE 10 MG/ML
10 INJECTION INFILTRATION; PERINEURAL
Status: COMPLETED | OUTPATIENT
Start: 2018-10-16 | End: 2018-10-16

## 2018-10-16 RX ADMIN — LIDOCAINE HYDROCHLORIDE 10 ML: 10 INJECTION, SOLUTION INFILTRATION; PERINEURAL at 16:00

## 2018-10-16 NOTE — IP AVS SNAPSHOT
Summary of Care Report The Summary of Care report has been created to help improve care coordination. Users with access to [employer] or [address] (Web-based application) may access additional patient information including the Discharge Summary. If you are not currently a "[employer] user and need more information, please call the number listed below in the [place] [address] section and ask to be connected with Medical Records. Facility Information Name Address Phone [address] [address] [phone] Patient Information Patient Name Sex JOSE JUAN Moses Sensor ([id number]) Male 1933 Discharge Information Admitting Provider Service Area Unit  
 (none) [facility] / [phone] Discharge Provider Discharge Date/Time Discharge Disposition Destination (none) (none) (none) (none) Patient Language Language ENGLISH [13] Hospital Problems as of 10/16/2018  Reviewed: 10/4/2018  5:11 PM by Gavino Blue MD  
 None Non-Hospital Problems as of 10/16/2018  Reviewed: 10/4/2018  5:11 PM by Gavino Blue MD  
  
  
  
 Class Noted - Resolved Last Modified Active Problems Essential hypertension  2010 - Present 2018 by Emilia Small MD  
  Entered by Brooklyn Moreno Gout  2010 - Present 2018 by Emilia Small MD  
  Entered by Brooklyn Moreno Elevated PSA  2010 - Present 2010 by Brooklyn Moreno Entered by Brooklyn Moreno Hernia of unspecified site of abdominal cavity without mention of obstruction or gangrene  2010 - Present 2010 by Brooklyn Moreno Entered by Brooklyn Moreno   White coat hypertension  2015 - Present 2015 by Gavino Blue MD  
  Entered by Gavino Blue MD  
 Diabetes mellitus type 2, diet-controlled ([facility]  5/23/2016 - Present 5/23/2016 by Laxmi Genao MD  
  Entered by Laxmi Genao MD  
  Diabetes mellitus, type 2 [facility])  Unknown - Present 5/23/2016 by Laxmi Genao MD  
  Entered by Laxmi Genao MD  
  Type 2 diabetes mellitus with nephropathy ([facility]  1/11/2018 - Present 1/11/2018 by Laxmi Genao MD  
  Entered by Laxmi Genao MD  
  Hemoptysis  8/23/2018 - Present 8/23/2018 by Luz Summers MD  
  Entered by Luz Summers MD  
  Malignant neoplasm of upper lobe of left lung ([facility]  8/23/2018 - Present 8/23/2018 by Luz Summers MD  
  Entered by Luz Summers MD  
  Pleural effusion  8/23/2018 - Present 8/23/2018 by Luz Summers MD  
  Entered by Luz Summers MD  
  Type 2 diabetes mellitus with diabetic neuropathy ([facility]  10/4/2018 - Present 10/4/2018 by Laxmi Genao MD  
  Entered by Laxmi Genao MD  
  
You are allergic to the following Allergen Reactions Ciprofloxacin Other (comments) Nauseated, felt fuzzy Erythromycin Unknown (comments) Pcn (Penicillins) Swelling Current Discharge Medication List  
  
ASK your doctor about these medications Dose & Instructions Dispensing Information Comments  
 albuterol 90 mcg/actuation inhaler Commonly known as:  PROVENTIL HFA, VENTOLIN HFA, PROAIR HFA Dose:  2 Puff Take 2 Puffs by inhalation every four (4) hours as needed for Shortness of Breath. Quantity:  1 Inhaler Refills:  12  
   
 allopurinol 300 mg tablet Commonly known as:  ZYLOPRIM  
 TAKE ONE TABLET BY MOUTH ONCE DAILY TO PREVENT GOUT  
 Quantity:  90 Tab Refills:  12 Please consider 90 day supplies to promote better adherence ALPRAZolam 0.5 mg tablet Commonly known as:  XANAX  
 TAKE ONE TABLET BY MOUTH TWICE DAILY AS NEEDED FOR ANXIETY Quantity:  60 Tab Refills:  3  
   
 atenolol 50 mg tablet Commonly known as:  TENORMIN  
 TAKE 1/2  TABLET BY MOUTH TWICE DAILY FOR BLOOD PRESSURE Quantity:  180 Tab Refills:  3  
   
 cetirizine 10 mg tablet Commonly known as:  ZYRTEC Dose:  10 mg Take 1 Tab by mouth daily. Quantity:  15 Tab Refills:  5  
   
 finasteride 5 mg tablet Commonly known as:  PROSCAR Dose:  5 mg Take 1 Tab by mouth daily. For prostate Quantity:  30 Tab Refills:  12  
   
 fluticasone-vilanterol 100-25 mcg/dose inhaler Commonly known as:  BREO ELLIPTA Dose:  1 Puff Take 1 Puff by inhalation daily. Quantity:  1 Inhaler Refills:  12  
   
 gabapentin 300 mg capsule Commonly known as:  NEURONTIN One tab po qhs- 1st week. Then 1 tab po bid- 2nd week. Then 1 tab po tid. For pain in feet Quantity:  90 Cap Refills:  5  
   
 hydroCHLOROthiazide 12.5 mg tablet Commonly known as:  HYDRODIURIL Dose:  12.5 mg Take 1 Tab by mouth daily. For blood pressure Quantity:  30 Tab Refills:  12  
   
 morphine IR 15 mg tablet Commonly known as:  MS IR  
 2 tabs po every 4-6 hours as needed for pain Quantity:  60 Tab Refills:  0 MULTIPLE VITAMINS PO Dose:  1 Tab Take 1 Tab by mouth daily. Refills:  0  
   
 piroxicam 20 mg capsule Commonly known as:  Olivia Lynn Dose:  20 mg Take 1 Cap by mouth daily. For pain Quantity:  30 Cap Refills:  5  
   
 pravastatin 20 mg tablet Commonly known as:  PRAVACHOL  
 TAKE ONE TABLET BY MOUTH ONCE DAILY AT  NIGHT  FOR  CHOLESTEROL Quantity:  90 Tab Refills:  3 PRESERVISION AREDS 2 PO Take  by mouth daily. Refills:  0  
   
 promethazine-codeine 6.25-10 mg/5 mL syrup Commonly known as:  PHENERGAN with CODEINE Dose:  1 tsp Take 5 mL by mouth every six (6) hours as needed for Cough. Max Daily Amount: 20 mL. Quantity:  240 mL Refills:  1  
   
 promethazine-dextromethorphan 6.25-15 mg/5 mL syrup Commonly known as:  PROMETHAZINE-DM Dose:  5 mL Take 5 mL by mouth four (4) times daily as needed for Cough. Quantity:  240 mL Refills:  2  
   
 terazosin 10 mg capsule Commonly known as:  HYTRIN  
 TAKE ONE CAPSULE BY MOUTH ONCE DAILY FOR BLOOD PRESSURE AND PROSTATE Quantity:  90 Cap Refills:  12  
   
 triazolam 0.25 mg tablet Commonly known as:  Seferino Rubin Dose:  0.25 mg Take 1 Tab by mouth nightly as needed. Max Daily Amount: 0.25 mg.  
 Quantity:  90 Tab Refills:  1 VITAMIN D3 1,000 unit Cap Generic drug:  cholecalciferol Dose:  1 Tab Take 1 Tab by mouth daily. Refills:  0 Current Immunizations Name Date Influenza High Dose Vaccine PF 10/18/2017, 9/12/2016, 11/30/2015, 10/13/2014 Influenza Vaccine 11/18/2013 Influenza Vaccine (Tri) Adjuvanted 10/4/2018 Influenza Vaccine Split 10/31/2012, 10/17/2011, 10/29/2010 Pneumococcal Conjugate (PCV-13) 10/4/2018 TD Vaccine 8/28/2012 ZZZ-RETIRED (DO NOT USE) Pneumococcal Vaccine (Unspecified Type) 8/28/2012 Follow-up Information None Discharge Instructions [facility] Special Procedures/Radiology Department Radiologist: Osmany Stevens Date: October 16, 2018 Thoracentesis Discharge Instructions You may have an aching pain in the puncture site tonight. Take Tylenol, as directed on the label, for pain or discomfort. Avoid ibuprofen (Advil, Motrin) and aspirin products for the next 48 hours as these drugs may cause you to bleed. Resume your previous diet and follow the medication reconciliation form. Rest for the next 24 hours. If you experience shortness of breath (other than your normal breathing pattern) difficulty breathing, or have severe chest pain, call 911 and go to the nearest Emergency Room. Be sure to follow up with your referring physician as soon as possible Chart Review Routing History Recipient Method Report Sent By Trina Redmond Geovanna Mora MD  
Phone: [phone] In [facility] EKG CUSTOM REPORT Kulwant Sherine [[id number]] 11/5/2012  1:19 PM 11/05/2012 Valentino Lever, MD  
Phone: [phone] In [facility] EKG CUSTOM REPORT Kulwant Basurto [[id number]] 11/5/2012  1:19 PM 11/05/2012 Geovanna Mora MD  
Phone: [phone] In Basket IP Auto Routed Notes Migdalia Duckworth MD [[id number]] 8/23/2018  5:13 AM 08/23/2018

## 2018-10-16 NOTE — PROGRESS NOTES
Spoke with MD Dedrick Silverman about chest xray.  Per MD Dedrick Silverman patient can be discharged

## 2018-10-16 NOTE — DISCHARGE INSTRUCTIONS
[facility]  Special Procedures/Radiology Department    Radiologist: Maite aPrra    Date: October 16, 2018      Thoracentesis Discharge Instructions    You may have an aching pain in the puncture site tonight. Take Tylenol, as directed on the label, for pain or discomfort. Avoid ibuprofen (Advil, Motrin) and aspirin products for the next 48 hours as these drugs may cause you to bleed. Resume your previous diet and follow the medication reconciliation form. Rest for the next 24 hours. If you experience shortness of breath (other than your normal breathing pattern) difficulty breathing, or have severe chest pain, call 911 and go to the nearest Emergency Room.     Be sure to follow up with your referring physician as soon as possible

## 2018-10-22 ENCOUNTER — OFFICE VISIT (OUTPATIENT)
Dept: FAMILY MEDICINE CLINIC | Age: 83
End: 2018-10-22

## 2018-10-22 VITALS
DIASTOLIC BLOOD PRESSURE: 56 MMHG | WEIGHT: 145 LBS | RESPIRATION RATE: 14 BRPM | HEART RATE: 69 BPM | TEMPERATURE: 95.8 F | OXYGEN SATURATION: 96 % | BODY MASS INDEX: 22.76 KG/M2 | SYSTOLIC BLOOD PRESSURE: 105 MMHG | HEIGHT: 67 IN

## 2018-10-22 DIAGNOSIS — C34.91 MALIGNANT NEOPLASM OF RIGHT LUNG, UNSPECIFIED PART OF LUNG (HCC): Primary | ICD-10-CM

## 2018-10-22 DIAGNOSIS — R07.9 CHEST PAIN, UNSPECIFIED TYPE: ICD-10-CM

## 2018-10-22 RX ORDER — TERAZOSIN 10 MG/1
CAPSULE ORAL
Qty: 90 CAP | Refills: 12 | Status: SHIPPED | OUTPATIENT
Start: 2018-10-22

## 2018-10-22 RX ORDER — PRAVASTATIN SODIUM 20 MG/1
TABLET ORAL
Qty: 90 TAB | Refills: 3 | Status: SHIPPED | OUTPATIENT
Start: 2018-10-22 | End: 2019-02-01

## 2018-10-22 NOTE — PROGRESS NOTES
HISTORY OF PRESENT ILLNESS  Dalia García is a 80 y.o. adult. HPI saw Dr. Estrella Uriostegui last week, and had 2600 ml thoracentesis by Dr. Eduardo Lindo. Has been on Tagressa thru Dr. Estrella Uriostegui for 45 days, and is supposed to be on it for 45 more days, then will have scan repeated. Feeling more energetic. Pain seems to get worse the more he does. Pain was present for most of the day however. Minimal cough. ROS    Physical Exam   Constitutional: She appears well-developed and well-nourished. HENT:   Right Ear: External ear normal.   Left Ear: External ear normal.   Mouth/Throat: Oropharynx is clear and moist.   Neck: No thyromegaly present. Cardiovascular: Normal rate, regular rhythm, normal heart sounds and intact distal pulses. Pulmonary/Chest: Breath sounds normal. She has no wheezes. Decreased breath sounds rll   Abdominal: Soft. Bowel sounds are normal. She exhibits no distension and no mass. There is no tenderness. Musculoskeletal: She exhibits no edema. Lymphadenopathy:     She has no cervical adenopathy. Nursing note and vitals reviewed. ASSESSMENT and PLAN  Orders Placed This Encounter    terazosin (HYTRIN) 10 mg capsule    pravastatin (PRAVACHOL) 20 mg tablet     Diagnoses and all orders for this visit:    1. Malignant neoplasm of right lung, unspecified part of lung (HCC)    2. Chest pain, unspecified type    Other orders  -     terazosin (HYTRIN) 10 mg capsule; TAKE ONE CAPSULE BY MOUTH ONCE DAILY FOR BLOOD PRESSURE AND PROSTATE  -     pravastatin (PRAVACHOL) 20 mg tablet; TAKE ONE TABLET BY MOUTH ONCE DAILY AT  NIGHT  FOR  CHOLESTEROL      Follow-up Disposition:  Return in about 4 weeks (around 11/19/2018).

## 2018-10-22 NOTE — PROGRESS NOTES
Chief Complaint   Patient presents with    Shortness of Breath    Extremity Weakness     1. Have you been to the ER, urgent care clinic since your last visit? Hospitalized since your last visit? No    2. Have you seen or consulted any other health care providers outside of the [address] [place] [address] since your last visit? Include any pap smears or colon screening.  No     Health Maintenance Due   Topic Date Due    Shingrix Vaccine Age 49> (1 of 2) 07/22/1983    EYE EXAM RETINAL OR DILATED Q1  06/08/2017    HEMOGLOBIN A1C Q6M  04/18/2018    GLAUCOMA SCREENING Q2Y  06/08/2018    FOOT EXAM Q1  10/18/2018    MICROALBUMIN Q1  10/18/2018    LIPID PANEL Q1  10/18/2018    MEDICARE YEARLY EXAM  10/19/2018

## 2018-11-08 ENCOUNTER — HOSPITAL ENCOUNTER (OUTPATIENT)
Dept: ULTRASOUND IMAGING | Age: 83
Discharge: HOME | End: 2018-11-08
Attending: INTERNAL MEDICINE
Payer: MEDICARE

## 2018-11-08 ENCOUNTER — HOSPITAL ENCOUNTER (OUTPATIENT)
Dept: GENERAL RADIOLOGY | Age: 83
Discharge: HOME | End: 2018-11-08
Attending: RADIOLOGY
Payer: MEDICARE

## 2018-11-08 VITALS
HEART RATE: 87 BPM | SYSTOLIC BLOOD PRESSURE: 138 MMHG | DIASTOLIC BLOOD PRESSURE: 68 MMHG | BODY MASS INDEX: 22.76 KG/M2 | TEMPERATURE: 98 F | OXYGEN SATURATION: 99 % | HEIGHT: 67 IN | WEIGHT: 145 LBS | RESPIRATION RATE: 18 BRPM

## 2018-11-08 DIAGNOSIS — J91.0 PLEURAL EFFUSION, MALIGNANT: ICD-10-CM

## 2018-11-08 PROCEDURE — 32555 ASPIRATE PLEURA W/ IMAGING: CPT

## 2018-11-08 PROCEDURE — 71045 X-RAY EXAM CHEST 1 VIEW: CPT

## 2018-11-08 NOTE — PROGRESS NOTES
Name of procedure: Thoracentesis 2 Liters removed    Complications, if any, r/t procedure: none    VS : Stable    Pt toleraed procedure well. VSS. No C/O pain or shortness of breath. Dressing to site D&I. No bleeding or hematoma noted to site. PCXR complete. MD has read PCXR and assessed pt. Per MD pt may be discharged. Discharge instructions given. Pt and family verbalized understanding. Copy on chart and copy given to pt. Pt taken to car by wheelchair and taken home by family. NAD noted at time of discarge.

## 2018-11-08 NOTE — DISCHARGE INSTRUCTIONS
[facility]  Radiology Department  [phone]    Radiologist: Dr. York Books    Date: 11/8/2018      Thoracentesis Discharge Instructions    You may have an aching pain in the puncture site tonight as the numbing medicine wears off. You may take Tylenol, as directed on the label, for pain or discomfort. Avoid ibuprofen (Advil, Motrin) and aspirin products for the next 48 hours as these drugs may increase your chance of bleeding. You have develop a mild cough as the lungs re expand with air, this should resolve with in the next 24 hours. Resume your previous diet and continue your prescribed medicaitons. Rest for the next 24 hours. If you experience shortness of breath (other than your normal breathing pattern) difficulty breathing, or have severe chest pain, call 911 and go to the nearest Emergency Room.     Be sure to follow up with your referring physician as soon as possible

## 2018-11-26 ENCOUNTER — OFFICE VISIT (OUTPATIENT)
Dept: FAMILY MEDICINE CLINIC | Age: 83
End: 2018-11-26

## 2018-11-26 VITALS
DIASTOLIC BLOOD PRESSURE: 58 MMHG | HEART RATE: 67 BPM | SYSTOLIC BLOOD PRESSURE: 111 MMHG | BODY MASS INDEX: 21.41 KG/M2 | WEIGHT: 136.4 LBS | RESPIRATION RATE: 14 BRPM | HEIGHT: 67 IN | OXYGEN SATURATION: 94 % | TEMPERATURE: 95.4 F

## 2018-11-26 DIAGNOSIS — R53.1 WEAKNESS: ICD-10-CM

## 2018-11-26 DIAGNOSIS — C34.11 MALIGNANT NEOPLASM OF UPPER LOBE OF RIGHT LUNG (HCC): ICD-10-CM

## 2018-11-26 DIAGNOSIS — R63.0 ANOREXIA: ICD-10-CM

## 2018-11-26 DIAGNOSIS — K59.03 DRUG-INDUCED CONSTIPATION: Primary | ICD-10-CM

## 2018-11-26 DIAGNOSIS — R07.89 OTHER CHEST PAIN: ICD-10-CM

## 2018-11-26 DIAGNOSIS — R60.9 EDEMA, UNSPECIFIED TYPE: ICD-10-CM

## 2018-11-26 RX ORDER — MEGESTROL ACETATE 20 MG/1
20 TABLET ORAL DAILY
Qty: 30 TAB | Refills: 5 | Status: SHIPPED | OUTPATIENT
Start: 2018-11-26

## 2018-11-26 RX ORDER — MORPHINE SULFATE 15 MG/1
TABLET ORAL
Qty: 90 TAB | Refills: 0 | Status: SHIPPED | OUTPATIENT
Start: 2018-11-26 | End: 2018-12-18 | Stop reason: SDUPTHER

## 2018-11-26 RX ORDER — POLYETHYLENE GLYCOL 3350 17 G/17G
17 POWDER, FOR SOLUTION ORAL 2 TIMES DAILY
Qty: 850 G | Refills: 5 | Status: SHIPPED | OUTPATIENT
Start: 2018-11-26

## 2018-11-26 RX ORDER — LUBIPROSTONE 24 UG/1
24 CAPSULE, GELATIN COATED ORAL
Qty: 30 CAP | Refills: 5 | Status: SHIPPED | OUTPATIENT
Start: 2018-11-26 | End: 2018-12-26

## 2018-11-26 NOTE — PROGRESS NOTES
HISTORY OF PRESENT ILLNESS Khushi Alberto is a 80 y.o. adult. HPI Has lost 9 more lbs since last seen. Has also had progressive swelling in feet, for the last 3-4 months. Extended release Morphine made him sick to his stomach. Still gets a severe pain in chest , worse when walks, only lasts a few minutes. Also gets some pains in both sides of chest at times. Still gets short of breath walking from one room to the next. Has also been constipated. Last bm was 4-5 days ago. Took one dose of miralax- some relief. No appetite. Too weak to drive. ROS Physical Exam  
Constitutional: She appears well-developed and well-nourished. HENT:  
Right Ear: External ear normal.  
Left Ear: External ear normal.  
Mouth/Throat: Oropharynx is clear and moist.  
Neck: No thyromegaly present. Cardiovascular: Normal rate, regular rhythm, normal heart sounds and intact distal pulses. Pulmonary/Chest: Effort normal and breath sounds normal. No respiratory distress. She has no wheezes. Decreased breath sounds on R side Abdominal: Soft. Bowel sounds are normal. She exhibits no distension and no mass. There is no tenderness. There is no guarding. Musculoskeletal: Normal range of motion. She exhibits edema (1+edema bilaterally). Lymphadenopathy:  
  She has no cervical adenopathy. Nursing note and vitals reviewed. ASSESSMENT and PLAN Orders Placed This Encounter  CBC WITH AUTOMATED DIFF  
 METABOLIC PANEL, COMPREHENSIVE  morphine IR (MS IR) 15 mg tablet  polyethylene glycol (MIRALAX) 17 gram/dose powder  lubiPROStone (AMITIZA) 24 mcg capsule  megestrol (MEGACE) 20 mg tablet Diagnoses and all orders for this visit: 1. Drug-induced constipation 2. Other chest pain 
-     morphine IR (MS IR) 15 mg tablet; 2 tabs po every 4-6 hours as needed for pain 3.  Malignant neoplasm of upper lobe of right lung (HCC) 
-     morphine IR (MS IR) 15 mg tablet; 2 tabs po every 4-6 hours as needed for pain 4. Anorexia 5. Weakness 
-     CBC WITH AUTOMATED DIFF 
-     METABOLIC PANEL, COMPREHENSIVE 6. Edema, unspecified type Other orders -     polyethylene glycol (MIRALAX) 17 gram/dose powder; Take 17 g by mouth two (2) times a day. -     lubiPROStone (AMITIZA) 24 mcg capsule; Take 1 Cap by mouth daily (with breakfast) for 30 days. For constipation 
-     megestrol (MEGACE) 20 mg tablet; Take 1 Tab by mouth daily. For appetite Follow-up Disposition: Not on File

## 2018-11-26 NOTE — PROGRESS NOTES
Chief Complaint Patient presents with  Lung Cancer  Decreased Appetite  Weight Loss 1. Have you been to the ER, urgent care clinic since your last visit? Hospitalized since your last visit? No 
 
2. Have you seen or consulted any other health care providers outside of the [address] [place] [address] since your last visit? Include any pap smears or colon screening. No  
 
Health Maintenance Due Topic Date Due  Shingrix Vaccine Age 50> (1 of 2) 07/22/1983  
 EYE EXAM RETINAL OR DILATED Q1  06/08/2017  
 HEMOGLOBIN A1C Q6M  04/18/2018  GLAUCOMA SCREENING Q2Y  06/08/2018  
 FOOT EXAM Q1  10/18/2018  MICROALBUMIN Q1  10/18/2018  LIPID PANEL Q1  10/18/2018  MEDICARE YEARLY EXAM  10/19/2018 Pt is not diabetic

## 2018-11-27 LAB
ALBUMIN SERPL-MCNC: 3.9 G/DL (ref 3.5–4.7)
ALBUMIN/GLOB SERPL: 1.6 {RATIO} (ref 1.2–2.2)
ALP SERPL-CCNC: 160 IU/L (ref 39–117)
ALT SERPL-CCNC: 9 IU/L (ref 0–44)
AST SERPL-CCNC: 20 IU/L (ref 0–40)
BASOPHILS # BLD AUTO: 0 X10E3/UL (ref 0–0.2)
BASOPHILS NFR BLD AUTO: 0 %
BILIRUB SERPL-MCNC: 0.5 MG/DL (ref 0–1.2)
BUN SERPL-MCNC: 24 MG/DL (ref 8–27)
BUN/CREAT SERPL: 24 (ref 10–24)
CALCIUM SERPL-MCNC: 8.6 MG/DL (ref 8.6–10.2)
CHLORIDE SERPL-SCNC: 98 MMOL/L (ref 96–106)
CO2 SERPL-SCNC: 24 MMOL/L (ref 20–29)
CREAT SERPL-MCNC: 1.02 MG/DL (ref 0.76–1.27)
EOSINOPHIL # BLD AUTO: 0 X10E3/UL (ref 0–0.4)
EOSINOPHIL NFR BLD AUTO: 1 %
ERYTHROCYTE [DISTWIDTH] IN BLOOD BY AUTOMATED COUNT: 16.1 % (ref 12.3–15.4)
GLOBULIN SER CALC-MCNC: 2.4 G/DL (ref 1.5–4.5)
GLUCOSE SERPL-MCNC: 109 MG/DL (ref 65–99)
HCT VFR BLD AUTO: 33.5 % (ref 37.5–51)
HGB BLD-MCNC: 10.7 G/DL (ref 13–17.7)
IMM GRANULOCYTES # BLD: 0 X10E3/UL (ref 0–0.1)
IMM GRANULOCYTES NFR BLD: 0 %
LYMPHOCYTES # BLD AUTO: 0.5 X10E3/UL (ref 0.7–3.1)
LYMPHOCYTES NFR BLD AUTO: 13 %
MCH RBC QN AUTO: 25.1 PG (ref 26.6–33)
MCHC RBC AUTO-ENTMCNC: 31.9 G/DL (ref 31.5–35.7)
MCV RBC AUTO: 79 FL (ref 79–97)
MONOCYTES # BLD AUTO: 0.3 X10E3/UL (ref 0.1–0.9)
MONOCYTES NFR BLD AUTO: 8 %
NEUTROPHILS # BLD AUTO: 2.8 X10E3/UL (ref 1.4–7)
NEUTROPHILS NFR BLD AUTO: 78 %
PLATELET # BLD AUTO: 124 X10E3/UL (ref 150–379)
POTASSIUM SERPL-SCNC: 3.7 MMOL/L (ref 3.5–5.2)
PROT SERPL-MCNC: 6.3 G/DL (ref 6–8.5)
RBC # BLD AUTO: 4.26 X10E6/UL (ref 4.14–5.8)
SODIUM SERPL-SCNC: 137 MMOL/L (ref 134–144)
WBC # BLD AUTO: 3.6 X10E3/UL (ref 3.4–10.8)

## 2018-12-03 ENCOUNTER — HOSPITAL ENCOUNTER (OUTPATIENT)
Dept: ULTRASOUND IMAGING | Age: 83
Discharge: HOME | End: 2018-12-03
Attending: INTERNAL MEDICINE
Payer: MEDICARE

## 2018-12-03 ENCOUNTER — HOSPITAL ENCOUNTER (OUTPATIENT)
Dept: GENERAL RADIOLOGY | Age: 83
Discharge: HOME | End: 2018-12-03
Attending: RADIOLOGY
Payer: MEDICARE

## 2018-12-03 ENCOUNTER — HOSPITAL ENCOUNTER (OUTPATIENT)
Dept: CT IMAGING | Age: 83
Discharge: HOME | End: 2018-12-03
Attending: INTERNAL MEDICINE
Payer: MEDICARE

## 2018-12-03 VITALS
TEMPERATURE: 98.6 F | SYSTOLIC BLOOD PRESSURE: 110 MMHG | DIASTOLIC BLOOD PRESSURE: 64 MMHG | RESPIRATION RATE: 18 BRPM | OXYGEN SATURATION: 97 % | HEART RATE: 71 BPM

## 2018-12-03 DIAGNOSIS — J91.0 MALIGNANT PLEURAL EFFUSION: ICD-10-CM

## 2018-12-03 DIAGNOSIS — C34.11 MALIGNANT NEOPLASM OF UPPER LOBE OF RIGHT LUNG (HCC): ICD-10-CM

## 2018-12-03 LAB — CREAT BLD-MCNC: 1 MG/DL (ref 0.6–1.3)

## 2018-12-03 PROCEDURE — 82565 ASSAY OF CREATININE: CPT

## 2018-12-03 PROCEDURE — 71260 CT THORAX DX C+: CPT

## 2018-12-03 PROCEDURE — 74011636320 HC RX REV CODE- 636/320: Performed by: INTERNAL MEDICINE

## 2018-12-03 PROCEDURE — 74011250636 HC RX REV CODE- 250/636: Performed by: INTERNAL MEDICINE

## 2018-12-03 PROCEDURE — 74177 CT ABD & PELVIS W/CONTRAST: CPT

## 2018-12-03 PROCEDURE — 32555 ASPIRATE PLEURA W/ IMAGING: CPT

## 2018-12-03 PROCEDURE — 71045 X-RAY EXAM CHEST 1 VIEW: CPT

## 2018-12-03 RX ORDER — SODIUM CHLORIDE 9 MG/ML
50 INJECTION, SOLUTION INTRAVENOUS
Status: COMPLETED | OUTPATIENT
Start: 2018-12-03 | End: 2018-12-03

## 2018-12-03 RX ORDER — BARIUM SULFATE 20 MG/ML
900 SUSPENSION ORAL
Status: DISCONTINUED | OUTPATIENT
Start: 2018-12-03 | End: 2018-12-03

## 2018-12-03 RX ORDER — SODIUM CHLORIDE 0.9 % (FLUSH) 0.9 %
10 SYRINGE (ML) INJECTION
Status: COMPLETED | OUTPATIENT
Start: 2018-12-03 | End: 2018-12-03

## 2018-12-03 RX ADMIN — SODIUM CHLORIDE 50 ML/HR: 900 INJECTION, SOLUTION INTRAVENOUS at 15:08

## 2018-12-03 RX ADMIN — IOHEXOL 50 ML: 240 INJECTION, SOLUTION INTRATHECAL; INTRAVASCULAR; INTRAVENOUS; ORAL at 15:08

## 2018-12-03 RX ADMIN — Medication 10 ML: at 15:08

## 2018-12-03 RX ADMIN — IOPAMIDOL 100 ML: 755 INJECTION, SOLUTION INTRAVENOUS at 15:08

## 2018-12-03 NOTE — DISCHARGE INSTRUCTIONS
[facility]  Special Procedures/Radiology Department    Radiologist: Eric Hem    Date: December 3, 2018      Thoracentesis Discharge Instructions    You may have an aching pain in the puncture site tonight. Take Tylenol, as directed on the label, for pain or discomfort. Avoid ibuprofen (Advil, Motrin) and aspirin products for the next 48 hours as these drugs may cause you to bleed. Resume your previous diet and follow the medication reconciliation form. Rest for the next 24 hours. If you experience shortness of breath (other than your normal breathing pattern) difficulty breathing, or have severe chest pain, call 911 and go to the nearest Emergency Room.     Be sure to follow up with your referring physician as soon as possible

## 2018-12-04 ENCOUNTER — OFFICE VISIT (OUTPATIENT)
Dept: FAMILY MEDICINE CLINIC | Age: 83
End: 2018-12-04

## 2018-12-04 VITALS
SYSTOLIC BLOOD PRESSURE: 122 MMHG | WEIGHT: 135.2 LBS | HEIGHT: 67 IN | OXYGEN SATURATION: 95 % | DIASTOLIC BLOOD PRESSURE: 70 MMHG | TEMPERATURE: 96 F | HEART RATE: 84 BPM | RESPIRATION RATE: 18 BRPM | BODY MASS INDEX: 21.22 KG/M2

## 2018-12-04 DIAGNOSIS — C34.11 MALIGNANT NEOPLASM OF UPPER LOBE OF RIGHT LUNG (HCC): Primary | ICD-10-CM

## 2018-12-04 DIAGNOSIS — K59.03 DRUG-INDUCED CONSTIPATION: ICD-10-CM

## 2018-12-04 DIAGNOSIS — R07.89 OTHER CHEST PAIN: ICD-10-CM

## 2018-12-04 NOTE — PROGRESS NOTES
HISTORY OF PRESENT ILLNESS Diana Bettencourt is a 80 y.o. adult. HPI In for followup. Had a thoracentesis done yesterday. Breathing is doing better, but still having pain in lower sternal area. Morphine seems to be helping pain fairly well. Pain only seems to come on with activity. Still taking tigressa thru Dr. [facility]. Has an appt with her on Friday. Appetite comes and goes. Hasnt been feeling that hungry. Has been having problems with constipation. Last  BM was 5 days ago. ROS Physical Exam  
Constitutional: She appears well-developed and well-nourished. HENT:  
Right Ear: External ear normal.  
Left Ear: External ear normal.  
Mouth/Throat: Oropharynx is clear and moist.  
Neck: No thyromegaly present. Cardiovascular: Normal rate, regular rhythm, normal heart sounds and intact distal pulses. Pulmonary/Chest: Effort normal and breath sounds normal. No respiratory distress. She has no wheezes. Decreased breath sounds rll Abdominal: Soft. Bowel sounds are normal. She exhibits no distension and no mass. There is no tenderness. There is no guarding. Musculoskeletal: Normal range of motion. She exhibits no edema. Lymphadenopathy:  
  She has no cervical adenopathy. Nursing note and vitals reviewed. ASSESSMENT and PLAN Orders Placed This Encounter  PEG 3350-Electrolytes (COLYTE;GOLYTELY) solr  PEG 3350-Electrolytes (COLYTE;GOLYTELY) solr Diagnoses and all orders for this visit: 
 
1. Malignant neoplasm of upper lobe of right lung ([address] 2. Drug-induced constipation 3. Other chest pain Other orders -     PEG 3350-Electrolytes (COLYTE;GOLYTELY) solr; Take 4,000 mL by mouth once for 1 dose. Drink until have large bowel movement 
-     PEG 3350-Electrolytes (COLYTE;GOLYTELY) solr; Take 4,000 mL by mouth once for 1 dose. Follow-up Disposition: 
Return in about 2 weeks (around 12/18/2018).

## 2018-12-04 NOTE — PROGRESS NOTES
Lux Pace is a 80 y.o. adult Chief Complaint Patient presents with  Diabetes 6 month follow up Visit Vitals /70 (BP 1 Location: Left arm, BP Patient Position: Sitting) Pulse 84 Temp 96 °F (35.6 °C) (Oral) Resp 18 Ht 5' 7\" (1.702 m) Wt 135 lb 3.2 oz (61.3 kg) SpO2 95% BMI 21.18 kg/m² Health Maintenance Due Topic Date Due  Shingrix Vaccine Age 50> (1 of 2) 07/22/1983  
 EYE EXAM RETINAL OR DILATED Q1  06/08/2017  
 HEMOGLOBIN A1C Q6M  04/18/2018  GLAUCOMA SCREENING Q2Y  06/08/2018  
 FOOT EXAM Q1  10/18/2018  MICROALBUMIN Q1  10/18/2018  LIPID PANEL Q1  10/18/2018  MEDICARE YEARLY EXAM  10/19/2018 1. Have you been to the ER, urgent care clinic since your last visit? Hospitalized since your last visit? No 
 
2. Have you seen or consulted any other health care providers outside of the [address] [place] [address] since your last visit? Include any pap smears or colon screening.  No

## 2018-12-10 DIAGNOSIS — F51.01 PRIMARY INSOMNIA: ICD-10-CM

## 2018-12-10 RX ORDER — TRIAZOLAM 0.25 MG/1
TABLET ORAL
Qty: 30 TAB | Refills: 2 | Status: SHIPPED | OUTPATIENT
Start: 2018-12-10 | End: 2018-12-18 | Stop reason: ALTCHOICE

## 2018-12-16 ENCOUNTER — APPOINTMENT (OUTPATIENT)
Dept: GENERAL RADIOLOGY | Age: 83
End: 2018-12-16
Attending: EMERGENCY MEDICINE
Payer: MEDICARE

## 2018-12-16 ENCOUNTER — HOSPITAL ENCOUNTER (EMERGENCY)
Age: 83
Discharge: HOME | End: 2018-12-16
Attending: EMERGENCY MEDICINE
Payer: MEDICARE

## 2018-12-16 VITALS
BODY MASS INDEX: 20.93 KG/M2 | HEART RATE: 80 BPM | TEMPERATURE: 97 F | WEIGHT: 133.38 LBS | HEIGHT: 67 IN | SYSTOLIC BLOOD PRESSURE: 117 MMHG | OXYGEN SATURATION: 97 % | DIASTOLIC BLOOD PRESSURE: 61 MMHG | RESPIRATION RATE: 23 BRPM

## 2018-12-16 DIAGNOSIS — S20.211A CONTUSION OF RIB ON RIGHT SIDE, INITIAL ENCOUNTER: ICD-10-CM

## 2018-12-16 DIAGNOSIS — J91.0 MALIGNANT PLEURAL EFFUSION: Primary | ICD-10-CM

## 2018-12-16 LAB
ALBUMIN SERPL-MCNC: 3.1 G/DL (ref 3.5–5)
ALBUMIN/GLOB SERPL: 1 {RATIO} (ref 1.1–2.2)
ALP SERPL-CCNC: 132 U/L (ref 45–117)
ALT SERPL-CCNC: 13 U/L (ref 12–78)
ANION GAP SERPL CALC-SCNC: 7 MMOL/L (ref 5–15)
APPEARANCE UR: CLEAR
AST SERPL-CCNC: 18 U/L (ref 15–37)
ATRIAL RATE: 84 BPM
BACTERIA URNS QL MICRO: NEGATIVE /HPF
BASOPHILS # BLD: 0 K/UL (ref 0–0.1)
BASOPHILS NFR BLD: 0 % (ref 0–1)
BILIRUB SERPL-MCNC: 0.5 MG/DL (ref 0.2–1)
BILIRUB UR QL: NEGATIVE
BUN SERPL-MCNC: 22 MG/DL (ref 6–20)
BUN/CREAT SERPL: 27 (ref 12–20)
CALCIUM SERPL-MCNC: 7.2 MG/DL (ref 8.5–10.1)
CALCULATED P AXIS, ECG09: 9 DEGREES
CALCULATED R AXIS, ECG10: 118 DEGREES
CALCULATED T AXIS, ECG11: 27 DEGREES
CHLORIDE SERPL-SCNC: 101 MMOL/L (ref 97–108)
CO2 SERPL-SCNC: 24 MMOL/L (ref 21–32)
COLOR UR: ABNORMAL
COMMENT, HOLDF: NORMAL
CREAT SERPL-MCNC: 0.82 MG/DL (ref 0.7–1.3)
DIAGNOSIS, 93000: NORMAL
DIFFERENTIAL METHOD BLD: ABNORMAL
EOSINOPHIL # BLD: 0 K/UL (ref 0–0.4)
EOSINOPHIL NFR BLD: 1 % (ref 0–7)
EPITH CASTS URNS QL MICRO: ABNORMAL /LPF
ERYTHROCYTE [DISTWIDTH] IN BLOOD BY AUTOMATED COUNT: 16.3 % (ref 11.5–14.5)
GLOBULIN SER CALC-MCNC: 3.2 G/DL (ref 2–4)
GLUCOSE SERPL-MCNC: 94 MG/DL (ref 65–100)
GLUCOSE UR STRIP.AUTO-MCNC: NEGATIVE MG/DL
HCT VFR BLD AUTO: 29.6 % (ref 36.6–50.3)
HGB BLD-MCNC: 9.7 G/DL (ref 12.1–17)
HGB UR QL STRIP: NEGATIVE
HYALINE CASTS URNS QL MICRO: ABNORMAL /LPF (ref 0–5)
IMM GRANULOCYTES # BLD: 0 K/UL (ref 0–0.04)
IMM GRANULOCYTES NFR BLD AUTO: 0 % (ref 0–0.5)
KETONES UR QL STRIP.AUTO: NEGATIVE MG/DL
LACTATE SERPL-SCNC: 0.9 MMOL/L (ref 0.4–2)
LEUKOCYTE ESTERASE UR QL STRIP.AUTO: NEGATIVE
LYMPHOCYTES # BLD: 0.4 K/UL (ref 0.8–3.5)
LYMPHOCYTES NFR BLD: 12 % (ref 12–49)
MCH RBC QN AUTO: 24.9 PG (ref 26–34)
MCHC RBC AUTO-ENTMCNC: 32.8 G/DL (ref 30–36.5)
MCV RBC AUTO: 76.1 FL (ref 80–99)
MONOCYTES # BLD: 0.3 K/UL (ref 0–1)
MONOCYTES NFR BLD: 9 % (ref 5–13)
NEUTS SEG # BLD: 2.9 K/UL (ref 1.8–8)
NEUTS SEG NFR BLD: 78 % (ref 32–75)
NITRITE UR QL STRIP.AUTO: NEGATIVE
NRBC # BLD: 0 K/UL (ref 0–0.01)
NRBC BLD-RTO: 0 PER 100 WBC
P-R INTERVAL, ECG05: 140 MS
PH UR STRIP: 7 [PH] (ref 5–8)
PLATELET # BLD AUTO: 103 K/UL (ref 150–400)
PMV BLD AUTO: 11.9 FL (ref 8.9–12.9)
POTASSIUM SERPL-SCNC: 4.1 MMOL/L (ref 3.5–5.1)
PROT SERPL-MCNC: 6.3 G/DL (ref 6.4–8.2)
PROT UR STRIP-MCNC: ABNORMAL MG/DL
Q-T INTERVAL, ECG07: 404 MS
QRS DURATION, ECG06: 80 MS
QTC CALCULATION (BEZET), ECG08: 477 MS
RBC # BLD AUTO: 3.89 M/UL (ref 4.1–5.7)
RBC #/AREA URNS HPF: ABNORMAL /HPF (ref 0–5)
RBC MORPH BLD: ABNORMAL
SAMPLES BEING HELD,HOLD: NORMAL
SODIUM SERPL-SCNC: 132 MMOL/L (ref 136–145)
SP GR UR REFRACTOMETRY: 1.02 (ref 1–1.03)
UA: UC IF INDICATED,UAUC: ABNORMAL
UROBILINOGEN UR QL STRIP.AUTO: 1 EU/DL (ref 0.2–1)
VENTRICULAR RATE, ECG03: 84 BPM
WBC # BLD AUTO: 3.6 K/UL (ref 4.1–11.1)
WBC URNS QL MICRO: ABNORMAL /HPF (ref 0–4)

## 2018-12-16 PROCEDURE — 71046 X-RAY EXAM CHEST 2 VIEWS: CPT

## 2018-12-16 PROCEDURE — 80053 COMPREHEN METABOLIC PANEL: CPT

## 2018-12-16 PROCEDURE — 36415 COLL VENOUS BLD VENIPUNCTURE: CPT

## 2018-12-16 PROCEDURE — 96374 THER/PROPH/DIAG INJ IV PUSH: CPT

## 2018-12-16 PROCEDURE — 83605 ASSAY OF LACTIC ACID: CPT

## 2018-12-16 PROCEDURE — 93005 ELECTROCARDIOGRAM TRACING: CPT

## 2018-12-16 PROCEDURE — 74011000250 HC RX REV CODE- 250: Performed by: EMERGENCY MEDICINE

## 2018-12-16 PROCEDURE — 74011250636 HC RX REV CODE- 250/636: Performed by: EMERGENCY MEDICINE

## 2018-12-16 PROCEDURE — 85025 COMPLETE CBC W/AUTO DIFF WBC: CPT

## 2018-12-16 PROCEDURE — 81001 URINALYSIS AUTO W/SCOPE: CPT

## 2018-12-16 PROCEDURE — 99285 EMERGENCY DEPT VISIT HI MDM: CPT

## 2018-12-16 RX ORDER — LIDOCAINE 4 G/100G
1 PATCH TOPICAL
Status: DISCONTINUED | OUTPATIENT
Start: 2018-12-16 | End: 2018-12-16 | Stop reason: HOSPADM

## 2018-12-16 RX ORDER — ONDANSETRON 4 MG/1
4 TABLET, ORALLY DISINTEGRATING ORAL
Qty: 10 TAB | Refills: 0 | Status: SHIPPED | OUTPATIENT
Start: 2018-12-16 | End: 2018-12-18 | Stop reason: SDUPTHER

## 2018-12-16 RX ORDER — ONDANSETRON 2 MG/ML
4 INJECTION INTRAMUSCULAR; INTRAVENOUS
Status: COMPLETED | OUTPATIENT
Start: 2018-12-16 | End: 2018-12-16

## 2018-12-16 RX ADMIN — ONDANSETRON 4 MG: 2 INJECTION INTRAMUSCULAR; INTRAVENOUS at 03:44

## 2018-12-16 NOTE — ED TRIAGE NOTES
Pt states he had his rt lung drained on December 4th and yesterday banged his rt chest/ribs and today feels weak. Pt denies sob, but is dyspneic in triage.

## 2018-12-16 NOTE — DISCHARGE INSTRUCTIONS
Learning About a Pleural Effusion  What is it? A pleural effusion (say \"PLER-maria victoria jy-CZSW-fcjy\") is the buildup of fluid in the space between tissues lining the lungs and the chest wall. Because of the fluid buildup, the lungs may not be able to expand completely. This can make it hard to breathe. A pleural empyema (say \"nd-xv-FA-muh\") is a problem that can happen with pleural effusion. Bacteria or other infections cause pus to form in the pleural fluid. But most pleural effusions don't become infected. What causes it? A pleural effusion has many causes. They include pneumonia, cancer, inflammation of the tissues around the lungs, and heart failure. What are the symptoms? Symptoms of a pleural effusion may include:  · Trouble breathing. · Shortness of breath. · Chest pain. · Fever. · A cough. A minor pleural effusion may not cause any symptoms. How is it diagnosed? A pleural effusion is usually diagnosed with an X-ray and a physical exam. The doctor listens to the airflow in your lungs. How is it treated? A pleural effusion can be treated by removing fluid from the space between the tissues around the lungs. This is done with a needle that's put into the chest (thoracentesis). A small amount of the fluid may be sent to a lab to find out what is causing the buildup of fluid. Removing the fluid may help to relieve symptoms, such as shortness of breath and chest pain. It can help the lungs to expand more fully. If the pleural effusion doesn't get better, a catheter may be placed in the chest. This is a flexible tube that allows fluid to drain from the lungs. The catheter stays in the chest until the doctor removes it. Some people may get a treatment that removes the fluid and then puts a medicine into the chest cavity. This helps to prevent too much fluid from building up again. A minor pleural effusion often goes away on its own.   Doctors may need to treat the condition that is causing the pleural effusion. For example, you may get medicines to treat pneumonia or congestive heart failure. When the condition is treated, the effusion usually goes away. For a pleural empyema, the pus needs to be drained. It may drain from a flexible tube placed in the chest. Or you may have surgery to drain it. You also will get antibiotics. Follow-up care is a key part of your treatment and safety. Be sure to make and go to all appointments, and call your doctor if you are having problems. It's also a good idea to know your test results and keep a list of the medicines you take. Where can you learn more? Go to http://bird-radha.info/. Enter A920 in the search box to learn more about \"Learning About a Pleural Effusion. \"  Current as of: June 18, 2018  Content Version: 11.8  © 9111-5045 [employer]. Care instructions adapted under license by [employer] (which disclaims liability or warranty for this information). If you have questions about a medical condition or this instruction, always ask your healthcare professional. [address] any warranty or liability for your use of this information. Thoracentesis: What to Expect at Home  Your Recovery  Thoracentesis (say \"wyca-zy-qsr-JUSTO-sis\") is a procedure to remove fluid from the space between the lungs and the chest wall (pleural space). This procedure may also be called a \"chest tap. \" It is normal to have a small amount of fluid in the pleural space. But too much fluid can build up because of problems such as infection, heart failure, or lung cancer. The procedure may have been done to help with shortness of breath and pain caused by the fluid buildup. Or you may have had this procedure so the doctor could test the fluid to find the cause of the buildup. Your chest may be sore where the doctor put the needle or catheter into your skin (the puncture site).  This usually gets better after a day or two. You can go back to work or your normal activities as soon as you feel up to it. If the doctor sent the fluid to a lab for testing, it may take several days to get the results. The doctor or nurse will discuss the results with you. This care sheet gives you a general idea about how long it will take for you to recover. But each person recovers at a different pace. Follow the steps below to feel better as quickly as possible. How can you care for yourself at home? Activity    · Rest when you feel tired. Getting enough sleep will help you recover.     · Avoid strenuous activities, such as bicycle riding, jogging, weight lifting, or aerobic exercise, until your doctor says it is okay.     · You may shower. Do not take a bath until the puncture site has healed, or until your doctor tells you it is okay.     · Ask your doctor when you can drive again.     · You may need to take 1 or 2 days off from work. It depends on the type of work you do and how you feel. Diet    · You can eat your normal diet.     · Drink plenty of fluids (unless your doctor tells you not to). Medicines    · Your doctor will tell you if and when you can restart your medicines. He or she will also give you instructions about taking any new medicines.     · If you take blood thinners, such as warfarin (Coumadin), clopidogrel (Plavix), or aspirin, be sure to talk to your doctor. He or she will tell you if and when to start taking those medicines again. Make sure that you understand exactly what your doctor wants you to do.     · Be safe with medicines. Take pain medicines exactly as directed. ? If the doctor gave you a prescription medicine for pain, take it as prescribed. ? If you are not taking a prescription pain medicine, ask your doctor if you can take an over-the-counter medicine. ? Do not take two or more pain medicines at the same time unless the doctor told you to. Many pain medicines have acetaminophen, which is Tylenol.  Too much acetaminophen (Tylenol) can be harmful.     · If you think your pain medicine is making you sick to your stomach:  ? Take your medicine after meals (unless your doctor has told you not to). ? Ask your doctor for a different pain medicine.     · If your doctor prescribed antibiotics, take them as directed. Do not stop taking them just because you feel better. You need to take the full course of antibiotics.    Care of the puncture site    · Wash the area daily with warm, soapy water, and pat it dry. Don't use hydrogen peroxide or alcohol, which may delay healing. You may cover the area with a gauze bandage if it weeps or rubs against clothing. Change the bandage every day.     · Keep the area clean and dry. Follow-up care is a key part of your treatment and safety. Be sure to make and go to all appointments, and call your doctor if you are having problems. It's also a good idea to know your test results and keep a list of the medicines you take. When should you call for help? Call 911 anytime you think you may need emergency care. For example, call if:    · You passed out (lost consciousness).     · You have severe trouble breathing.     · You have sudden chest pain and shortness of breath, or you cough up blood.    Call your doctor now or seek immediate medical care if:    · You have shortness of breath that is new or getting worse.     · You have new or worse pain in your chest, especially when you take a deep breath.     · You are sick to your stomach or cannot keep fluids down.     · You have a fever over 100°F.     · Bright red blood has soaked through the bandage over your puncture site.     · You have signs of infection, such as:  ? Increased pain, swelling, warmth, or redness. ? Red streaks leading from the puncture site. ? Pus draining from the puncture site. ? Swollen lymph nodes in your neck, armpits, or groin.   ? A fever.     · You cough up a lot more mucus than normal, or your mucus changes color.    Watch closely for changes in your health, and be sure to contact your doctor if you have any problems. Where can you learn more? Go to http://bird-radha.info/. Enter H529 in the search box to learn more about \"Thoracentesis: What to Expect at Home. \"  Current as of: December 6, 2017  Content Version: 11.8  © 9996-0134 [employer]. Care instructions adapted under license by [employer] (which disclaims liability or warranty for this information). If you have questions about a medical condition or this instruction, always ask your healthcare professional. [place] [address] any warranty or liability for your use of this information. Bruised Rib: Care Instructions  Overview    You can get a bruised rib if you fall or get hit, such as in an accident or while playing sports. The medical term for a bruise is \"contusion. \" Small blood vessels get torn and leak blood under the skin. Most people think of a bruise as a black-and-blue area. But bones and muscles can also get bruised. An injury may damage the rib but not cause a bruise that you can see. Sometimes it can be hard to tell if a rib is bruised or broken. The symptoms may be the same. And a broken bone can't always be seen on an X-ray. But the treatment for a bruised rib is often the same as treatment for a broken one. An injury to the ribs can cause pain. The pain may be worse when you breathe deeply, cough, or sneeze. In most cases, a bruised rib will heal on its own. You can take pain medicine while the rib mends. Pain relief allows you to take deep breaths. Follow-up care is a key part of your treatment and safety. Be sure to make and go to all appointments, and call your doctor if you are having problems. It's also a good idea to know your test results and keep a list of the medicines you take. How can you care for yourself at home?   · Rest and protect the injured or sore area. Stop, change, or take a break from any activity that causes pain. · Put ice or a cold pack on the area for 10 to 20 minutes at a time. Put a thin cloth between the ice and your skin. · After 2 or 3 days, if your swelling is gone, put a heating pad set on low or a warm cloth on your chest. Some doctors suggest that you go back and forth between hot and cold. Put a thin cloth between the heating pad and your skin. · Ask your doctor if you can take an over-the-counter pain medicine, such as acetaminophen (Tylenol), ibuprofen (Advil, Motrin), or naproxen (Aleve). Be safe with medicines. Read and follow all instructions on the label. · As your pain gets better, slowly return to your normal activities. Be patient. Rib bruises can take weeks or months to heal. If the pain gets worse, it may be a sign that you need to rest a while longer. When should you call for help? [id number] anytime you think you may need emergency care. For example, call if:    · You have severe trouble breathing.     Call your doctor now or seek immediate medical care if:    · You have trouble breathing.     · You have a fever.     · You have a new or worse cough.     · You have new or worse pain.    Watch closely for changes in your health, and be sure to contact your doctor if:    · You do not get better as expected. Where can you learn more? Go to http://bird-radha.info/. Enter R125 in the search box to learn more about \"Bruised Rib: Care Instructions. \"  Current as of: April 19, 2018  Content Version: 11.8  © 3020-3063 [employer]. Care instructions adapted under license by [employer] (which disclaims liability or warranty for this information). If you have questions about a medical condition or this instruction, always ask your healthcare professional. [address] any warranty or liability for your use of this information.

## 2018-12-16 NOTE — ED PROVIDER NOTES
EMERGENCY DEPARTMENT HISTORY AND PHYSICAL EXAM           Date: 12/16/2018  Patient Name: Elo Moreno    History of Presenting Illness     Chief Complaint   Patient presents with    Shortness of Breath    Fatigue       History Provided By: Patient    HPI: Elo Moreno is a 80 y.o. adult, pmhx lung CA / malignant pleural effusions / DM / HTN, who presents ambulatory to the ED c/o gradually worsening SOB and generalized fatigue that began throughout the day yesterday. He reports additional R sided lateral rib pain that began after an injury 1 week ago. Pt states he backed up into a nearby wooden chest a week ago, and expresses concern after noticing a bruise overlying the affected area. Pt notes taking Morphine regularly for pain, but states \"it usually only lasts me about 4 hours. \" On evaluation in this ED, pt states his pain is currently resolved. He reports having a single episode of nausea and vomiting today, stating \"I think it's because I took my morphine on an empty stomach. \" Pt denies any regular use of anticoagulants. He reports having an outpatient thoracentesis every 3-4 weeks; last tx on 12/04/18, and states \"I was going to go back sometime next week. Pt notes having a follow up with his PCP scheduled for 12/18/18. He states his PCP has previously discussed the option for a pigtail drain, but notes \"we haven't come to a decision yet. \" He states he was previously tx for HTN, but has since been discontinued off the medications. Pt otherwise specifically denies any recent fevers, chills, nausea, vomiting, diarrhea, abd pain, CP, urinary sxs, changes in BM, or headache.      PCP: Cherry Gottron, MD   IR: Kalli Bell MD  Oncology: Flako Quintanilla MD    PMHx: Significant for HTN, gout, DM, lung CA, malignant pleural effusions  PSHx: Significant for hernia repair, repeated thoracentesis, colonoscopy  Social Hx: -tobacco (former 2297), -EtOH, -Illicit Drugs    There are no other complaints, changes, or physical findings at this time. Current Facility-Administered Medications   Medication Dose Route Frequency Provider Last Rate Last Dose    lidocaine 4 % patch 1 Patch  1 Patch TransDERmal NOW Pankaj Kohler MD   1 Patch at 12/16/18 7057     Current Outpatient Medications   Medication Sig Dispense Refill    ondansetron (ZOFRAN ODT) 4 mg disintegrating tablet Take 1 Tab by mouth every eight (8) hours as needed for Nausea. 10 Tab 0    triazolam (HALCION) 0.25 mg tablet TAKE 1 TABLET BY MOUTH NIGHTLY AS NEEDED 30 Tab 2    morphine IR (MS IR) 15 mg tablet 2 tabs po every 4-6 hours as needed for pain 90 Tab 0    polyethylene glycol (MIRALAX) 17 gram/dose powder Take 17 g by mouth two (2) times a day. 850 g 5    lubiPROStone (AMITIZA) 24 mcg capsule Take 1 Cap by mouth daily (with breakfast) for 30 days. For constipation 30 Cap 5    megestrol (MEGACE) 20 mg tablet Take 1 Tab by mouth daily. For appetite 30 Tab 5    terazosin (HYTRIN) 10 mg capsule TAKE ONE CAPSULE BY MOUTH ONCE DAILY FOR BLOOD PRESSURE AND PROSTATE 90 Cap 12    pravastatin (PRAVACHOL) 20 mg tablet TAKE ONE TABLET BY MOUTH ONCE DAILY AT  NIGHT  FOR  CHOLESTEROL 90 Tab 3    piroxicam (FELDENE) 20 mg capsule Take 1 Cap by mouth daily. For pain 30 Cap 5    gabapentin (NEURONTIN) 300 mg capsule One tab po qhs- 1st week. Then 1 tab po bid- 2nd week. Then 1 tab po tid. For pain in feet 90 Cap 5    ALPRAZolam (XANAX) 0.5 mg tablet TAKE ONE TABLET BY MOUTH TWICE DAILY AS NEEDED FOR ANXIETY 60 Tab 3    allopurinol (ZYLOPRIM) 300 mg tablet TAKE ONE TABLET BY MOUTH ONCE DAILY TO PREVENT GOUT 90 Tab 12    atenolol (TENORMIN) 50 mg tablet TAKE 1/2  TABLET BY MOUTH TWICE DAILY FOR BLOOD PRESSURE 180 Tab 3    cetirizine (ZYRTEC) 10 mg tablet Take 1 Tab by mouth daily. 15 Tab 5    finasteride (PROSCAR) 5 mg tablet Take 1 Tab by mouth daily.  For prostate 30 Tab 12    VIT C/E/ZN/COPPR/LUTEIN/ZEAXAN (PRESERVISION AREDS 2 PO) Take  by mouth daily.      Cholecalciferol, Vitamin D3, (VITAMIN D3) 1,000 unit Cap Take 1 Tab by mouth daily.  MULTIVITAMINS (MULTIPLE VITAMINS PO) Take 1 Tab by mouth daily. Past History     Past Medical History:  Past Medical History:   Diagnosis Date    BP (high blood pressure) 2010    Cancer ([address]     Lung    Diabetes mellitus, type 2 (HCC)     Elevated PSA 2010    Elevated PSA     has had 2 biopsies, both were negative for cancer    Gout 2010       Past Surgical History:  Past Surgical History:   Procedure Laterality Date    CHEST SURGERY PROCEDURE UNLISTED      Previous thoracenteses    HX COLONOSCOPY      HX HERNIA REPAIR         Family History:  Family History   Problem Relation Age of Onset    Diabetes Brother        Social History:  Social History     Tobacco Use    Smoking status: Former Smoker     Last attempt to quit: 1965     Years since quittin.9    Smokeless tobacco: Never Used    Tobacco comment:    Substance Use Topics    Alcohol use: No    Drug use: No       Allergies: Allergies   Allergen Reactions    Ciprofloxacin Other (comments)     Nauseated, felt fuzzy    Erythromycin Unknown (comments)    Pcn [Penicillins] Swelling         Review of Systems   Review of Systems   Constitutional: Positive for fatigue. Negative for fever. Eyes: Negative. Respiratory: Positive for shortness of breath. Cardiovascular: Negative for chest pain. Gastrointestinal: Negative for abdominal pain, nausea and vomiting. Endocrine: Negative. Genitourinary: Negative. Negative for difficulty urinating, dysuria and hematuria. Musculoskeletal: Negative. Skin: Negative. Allergic/Immunologic: Negative. Neurological: Negative. Psychiatric/Behavioral: Negative for suicidal ideas. All other systems reviewed and are negative. Physical Exam   Physical Exam   Constitutional: She is oriented to person, place, and time.  She appears well-developed and well-nourished. No distress. HENT:   Head: Normocephalic and atraumatic. Nose: Nose normal.   Eyes: Conjunctivae and EOM are normal. No scleral icterus. Neck: Normal range of motion. No tracheal deviation present. Cardiovascular: Normal rate, regular rhythm, normal heart sounds and intact distal pulses. Exam reveals no friction rub. No murmur heard. Pulmonary/Chest: Effort normal. No stridor. No tachypnea. No respiratory distress. She has decreased breath sounds in the right middle field and the right lower field. She has no wheezes. She has no rales. Pt able to speak in full, unlabored sentences. Abdominal: Soft. Bowel sounds are normal. She exhibits no distension. There is no tenderness. There is no rebound. Musculoskeletal: Normal range of motion. She exhibits no tenderness. Neurological: She is alert and oriented to person, place, and time. No cranial nerve deficit. Skin: Skin is warm and dry. No rash noted. She is not diaphoretic. Psychiatric: She has a normal mood and affect. Her speech is normal and behavior is normal. Judgment and thought content normal. Cognition and memory are normal.   Nursing note and vitals reviewed.         Diagnostic Study Results     Labs -     Recent Results (from the past 12 hour(s))   EKG, 12 LEAD, INITIAL    Collection Time: 12/16/18  1:34 AM   Result Value Ref Range    Ventricular Rate 84 BPM    Atrial Rate 84 BPM    P-R Interval 140 ms    QRS Duration 80 ms    Q-T Interval 404 ms    QTC Calculation (Bezet) 477 ms    Calculated P Axis 9 degrees    Calculated R Axis 118 degrees    Calculated T Axis 27 degrees    Diagnosis       Normal sinus rhythm  Right axis deviation  Anterior infarct , age undetermined  When compared with ECG of 22-AUG-2018 22:29,  Anterior infarct is now present     CBC WITH AUTOMATED DIFF    Collection Time: 12/16/18  1:47 AM   Result Value Ref Range    WBC 3.6 (L) 4.1 - 11.1 K/uL    RBC 3.89 (L) 4.10 - 5.70 M/uL    HGB 9.7 (L) 12.1 - 17.0 g/dL    HCT 29.6 (L) 36.6 - 50.3 %    MCV 76.1 (L) 80.0 - 99.0 FL    MCH 24.9 (L) 26.0 - 34.0 PG    MCHC 32.8 30.0 - 36.5 g/dL    RDW 16.3 (H) 11.5 - 14.5 %    PLATELET 308 (L) 099 - 400 K/uL    MPV 11.9 8.9 - 12.9 FL    NRBC 0.0 0  WBC    ABSOLUTE NRBC 0.00 0.00 - 0.01 K/uL    NEUTROPHILS 78 (H) 32 - 75 %    LYMPHOCYTES 12 12 - 49 %    MONOCYTES 9 5 - 13 %    EOSINOPHILS 1 0 - 7 %    BASOPHILS 0 0 - 1 %    IMMATURE GRANULOCYTES 0 0.0 - 0.5 %    ABS. NEUTROPHILS 2.9 1.8 - 8.0 K/UL    ABS. LYMPHOCYTES 0.4 (L) 0.8 - 3.5 K/UL    ABS. MONOCYTES 0.3 0.0 - 1.0 K/UL    ABS. EOSINOPHILS 0.0 0.0 - 0.4 K/UL    ABS. BASOPHILS 0.0 0.0 - 0.1 K/UL    ABS. IMM. GRANS. 0.0 0.00 - 0.04 K/UL    DF AUTOMATED      RBC COMMENTS ANISOCYTOSIS  1+        RBC COMMENTS MICROCYTOSIS  1+        RBC COMMENTS OVALOCYTES  1+       METABOLIC PANEL, COMPREHENSIVE    Collection Time: 12/16/18  1:47 AM   Result Value Ref Range    Sodium 132 (L) 136 - 145 mmol/L    Potassium 4.1 3.5 - 5.1 mmol/L    Chloride 101 97 - 108 mmol/L    CO2 24 21 - 32 mmol/L    Anion gap 7 5 - 15 mmol/L    Glucose 94 65 - 100 mg/dL    BUN 22 (H) 6 - 20 MG/DL    Creatinine 0.82 0.70 - 1.30 MG/DL    BUN/Creatinine ratio 27 (H) 12 - 20      GFR est AA >60 >60 ml/min/1.73m2    GFR est non-AA >60 >60 ml/min/1.73m2    Calcium 7.2 (L) 8.5 - 10.1 MG/DL    Bilirubin, total 0.5 0.2 - 1.0 MG/DL    ALT (SGPT) 13 12 - 78 U/L    AST (SGOT) 18 15 - 37 U/L    Alk.  phosphatase 132 (H) 45 - 117 U/L    Protein, total 6.3 (L) 6.4 - 8.2 g/dL    Albumin 3.1 (L) 3.5 - 5.0 g/dL    Globulin 3.2 2.0 - 4.0 g/dL    A-G Ratio 1.0 (L) 1.1 - 2.2     LACTIC ACID    Collection Time: 12/16/18  1:47 AM   Result Value Ref Range    Lactic acid 0.9 0.4 - 2.0 MMOL/L   SAMPLES BEING HELD    Collection Time: 12/16/18  1:47 AM   Result Value Ref Range    SAMPLES BEING HELD 1BLUE     COMMENT        Add-on orders for these samples will be processed based on acceptable specimen integrity and analyte stability, which may vary by analyte. URINALYSIS W/ REFLEX CULTURE    Collection Time: 12/16/18  3:03 AM   Result Value Ref Range    Color YELLOW/STRAW      Appearance CLEAR CLEAR      Specific gravity 1.018 1.003 - 1.030      pH (UA) 7.0 5.0 - 8.0      Protein TRACE (A) NEG mg/dL    Glucose NEGATIVE  NEG mg/dL    Ketone NEGATIVE  NEG mg/dL    Bilirubin NEGATIVE  NEG      Blood NEGATIVE  NEG      Urobilinogen 1.0 0.2 - 1.0 EU/dL    Nitrites NEGATIVE  NEG      Leukocyte Esterase NEGATIVE  NEG      WBC 0-4 0 - 4 /hpf    RBC 0-5 0 - 5 /hpf    Epithelial cells FEW FEW /lpf    Bacteria NEGATIVE  NEG /hpf    UA:UC IF INDICATED CULTURE NOT INDICATED BY UA RESULT CNI      Hyaline cast 0-2 0 - 5 /lpf       Radiologic Studies -   XR CHEST PA LAT   Final Result   IMPRESSION:      Large right hydropneumothorax. Recommend chest tube placement. I discussed this impression with Dr. Dolly Sousa at [address] hours on 12/16/2018. CT Results  (Last 48 hours)    None        CXR Results  (Last 48 hours)               12/16/18 0205  XR CHEST PA LAT Final result    Impression:  IMPRESSION:       Large right hydropneumothorax. Recommend chest tube placement. I discussed this impression with Dr. Dolly Sousa at [address] hours on 12/16/2018. Narrative:  EXAM: XR CHEST PA LAT       INDICATION: Right chest pain and weakness. Right thoracentesis earlier this   month. COMPARISON: Portable chest on 12/3/2018       TECHNIQUE: PA and lateral chest views       FINDINGS: Cardiac monitoring wires overlie the thorax. The cardiomediastinal and   hilar contours are within normal limits. The pulmonary vasculature is within   normal limits. Large right hydropneumothorax is increased. Right upper lobe collapse is   increased. Left lung is clear. Medical Decision Making   I am the first provider for this patient.     I reviewed the vital signs, available nursing notes, past medical history, past surgical history, family history and social history. Vital Signs-Reviewed the patient's vital signs. Patient Vitals for the past 12 hrs:   Temp Pulse Resp BP SpO2   12/16/18 0315  80 23 117/61 97 %   12/16/18 0300  82 22 119/70 97 %   12/16/18 0250  79 25 113/64 97 %   12/16/18 0200  87 23 134/72 99 %   12/16/18 0145  84 23 139/73 97 %   12/16/18 0121 97 °F (36.1 °C) 99 28 125/79 100 %       Pulse Oximetry Analysis - 98% on RA    Cardiac Monitor:   Rate: 86 bpm  Rhythm: Normal Sinus Rhythm     Records Reviewed: Nursing Notes, Old Medical Records, Previous electrocardiograms, Previous Radiology Studies and Previous Laboratory Studies    Provider Notes (Medical Decision Making):     DDX:  Contusion, rib fx, pleurisy    Plan:  Labs, ekg, cxr    Impression:  Malignant pleural effusion, contusion    ED Course:   Initial assessment performed. The patients presenting problems have been discussed, and they are in agreement with the care plan formulated and outlined with them. I have encouraged them to ask questions as they arise throughout their visit. I reviewed our electronic medical record system for any past medical records that were available that may contribute to the patients current condition, the nursing notes and and vital signs from today's visit    Nursing notes will be reviewed as they become available in realtime while the pt has been in the ED. Maribel Irizarry MD    EKG interpretation 0134: NSR, R Axis, rate 84; , QRS 80, QTc ; 477no acute ischemia; Maribel Irizarry MD    I personally reviewed pt's imaging. Official read by radiology noted above. Maribel Irizarry MD    PROGRESS NOTE:  2:15 AM  Dr. Ashutosh Parker (radiology) called to note pt has a large R sided hydropneumothroax. Will review imaging as it is available and consult with hospitalist for further disposition.    Written by Keisha Rubin ED Scribe, as dictated by Maribel Irizarry MD    CONSULT NOTE:   3:36 AM  Maribel Irizarry MD spoke with Phoenix Pozo MD,   Specialty: Gretchen Weir due to large R sided hydropneumothorax. Discussed pt's HPI and available diagnostic results thus far. Discussed options for in pt vs out pt treatment. Consultant recommends ambulating pt to ensure his vital signs remain stable and does not significantly desat. If pt able to ambulate without distress, he agrees with plan to discharge home with close follow up at PCP / oncology offices and with IR for thoracentesis. Yasmin Vega MD    PROGRESS NOTE:  3:45 AM  Pt ambulated throughout the department. Able to maintain SpO2 at 89% throughout trial. Will discharge home with strict return precautions and instructions to follow up with PCP / oncology as soon as possible. Written by Eriberto Jiang, ED Scribe, as dictated by Yasmin Vega MD    04:15 am  Progress note:  Pt ready for discharge. Had extensive discussion with pt and family about inpt vs out pt options. Pt notes he does not want to be admitted and notes he would prefer to go home and f/u with his pcp and dr. Radha Thompson this week, again discussed strict return precautions. Pt's family at bedside aware of option for admission and in agreement with plan for dc. Discussed lab and imaging findings with pt and family, specifically noting large R sided hydropneumothorax. Pt will follow up as instructed. All questions have been answered, pt voiced understanding and agreement with plan. If narcotics were prescribed, pt was advised not to drive or operate heavy machinery. If abx were prescribed, pt advised that diarrhea and rash are possible side effects of the medications. Specific return precautions provided in addition to instructions for pt to return to the ED immediately should sx worsen at any time. Yasmin Vega MD      Critical Care Time:     none      Diagnosis     Clinical Impression:   1. Malignant pleural effusion    2.  Contusion of rib on right side, initial encounter PLAN:  1. Discharge Medication List as of 12/16/2018  3:47 AM        2. Follow-up Information     Follow up With Specialties Details Why Contact Info    Renard Morgan MD [facility] Call in 1 day  [address]  [address] [facility] [address]      Reny Hoffmann MD Hematology and Oncology, Hematology, Oncology Call in 1 day  [address] [facility]  [address]  [place]  [phone]          Return to ED if worse     Disposition:    DISCHARGE NOTE:  04:15  The patient's results have been reviewed with family and/or caregiver. They verbally convey their understanding and agreement of the patient's signs, symptoms, diagnosis, treatment, and prognosis. They additionally agree to follow up as recommended in the discharge instructions or to return to the Emergency Room should the patient's condition change prior to their follow-up appointment. The family and/or caregiver verbally agrees with the care-plan and all of their questions have been answered. The discharge instructions have also been provided to the them along with educational information regarding the patient's diagnosis and a list of reasons why the patient would want to return to the ER prior to their follow-up appointment should their condition change. Written by Nahid Chavez ED Scribe, as dictated by Po Goetz MD.             Attestations: This note is prepared by Nahid Chavez, acting as MD Po Kraus MD : The scribe's documentation has been prepared under my direction and personally reviewed by me in its entirety. I confirm that the note above accurately reflects all work, treatment, procedures, and medical decision making performed by me. This note will not be viewable in [address]

## 2018-12-16 NOTE — ED NOTES
Patient discharged by Lorri Alex MD. Patient provided with discharge instructions Rx and instructions on follow up care. Patient out of ED via wheelchair accompanied by family.

## 2018-12-18 ENCOUNTER — HOSPITAL ENCOUNTER (OUTPATIENT)
Dept: INTERVENTIONAL RADIOLOGY/VASCULAR | Age: 83
Discharge: HOME | End: 2018-12-18
Attending: FAMILY MEDICINE
Payer: MEDICARE

## 2018-12-18 ENCOUNTER — HOSPITAL ENCOUNTER (OUTPATIENT)
Dept: GENERAL RADIOLOGY | Age: 83
Discharge: HOME | End: 2018-12-18
Attending: RADIOLOGY
Payer: MEDICARE

## 2018-12-18 ENCOUNTER — OFFICE VISIT (OUTPATIENT)
Dept: FAMILY MEDICINE CLINIC | Age: 83
End: 2018-12-18

## 2018-12-18 ENCOUNTER — HOSPITAL ENCOUNTER (OUTPATIENT)
Dept: ULTRASOUND IMAGING | Age: 83
Discharge: HOME | End: 2018-12-18
Attending: FAMILY MEDICINE
Payer: MEDICARE

## 2018-12-18 VITALS
SYSTOLIC BLOOD PRESSURE: 107 MMHG | HEART RATE: 82 BPM | RESPIRATION RATE: 16 BRPM | WEIGHT: 133 LBS | DIASTOLIC BLOOD PRESSURE: 56 MMHG | HEIGHT: 67 IN | TEMPERATURE: 98.3 F | BODY MASS INDEX: 20.88 KG/M2 | OXYGEN SATURATION: 96 %

## 2018-12-18 VITALS
HEART RATE: 82 BPM | BODY MASS INDEX: 20.91 KG/M2 | DIASTOLIC BLOOD PRESSURE: 69 MMHG | TEMPERATURE: 98.3 F | RESPIRATION RATE: 14 BRPM | SYSTOLIC BLOOD PRESSURE: 132 MMHG | WEIGHT: 133.2 LBS | HEIGHT: 67 IN | OXYGEN SATURATION: 91 %

## 2018-12-18 DIAGNOSIS — R07.89 OTHER CHEST PAIN: ICD-10-CM

## 2018-12-18 DIAGNOSIS — C34.11 MALIGNANT NEOPLASM OF UPPER LOBE OF RIGHT LUNG (HCC): ICD-10-CM

## 2018-12-18 DIAGNOSIS — J91.0 MALIGNANT PLEURAL EFFUSION: ICD-10-CM

## 2018-12-18 DIAGNOSIS — J90 PLEURAL EFFUSION: Primary | ICD-10-CM

## 2018-12-18 DIAGNOSIS — F41.9 ANXIETY: ICD-10-CM

## 2018-12-18 PROCEDURE — 71045 X-RAY EXAM CHEST 1 VIEW: CPT

## 2018-12-18 PROCEDURE — 32555 ASPIRATE PLEURA W/ IMAGING: CPT

## 2018-12-18 RX ORDER — ALPRAZOLAM 1 MG/1
TABLET ORAL
Qty: 60 TAB | Refills: 3 | Status: SHIPPED | OUTPATIENT
Start: 2018-12-18 | End: 2019-01-11 | Stop reason: SDUPTHER

## 2018-12-18 RX ORDER — MORPHINE SULFATE 15 MG/1
TABLET ORAL
Qty: 90 TAB | Refills: 0 | Status: SHIPPED | OUTPATIENT
Start: 2018-12-18 | End: 2019-01-11 | Stop reason: SDUPTHER

## 2018-12-18 RX ORDER — ONDANSETRON 4 MG/1
4 TABLET, ORALLY DISINTEGRATING ORAL
Qty: 30 TAB | Refills: 5 | Status: SHIPPED | OUTPATIENT
Start: 2018-12-18

## 2018-12-18 NOTE — PROGRESS NOTES
HISTORY OF PRESENT ILLNESS  Katina King is a 80 y.o. adult. HPI Comes in with daughter Cait . Thinks that needs another thoracentesis. Still on Tigresso for lung cancer. Has been having arguments with wife over his health status. Apparently wife has anger problems, she has episodes of yelling at everyone. Cait is her stepdaughter. Alber Dick likes for cait to come, but his wife wants her to leave. She wants total control over everything. Son is coming in for Radha, and she does not want him there. Has been  for 32 years. ROS    Physical Exam   Constitutional: She appears well-developed and well-nourished. HENT:   Right Ear: External ear normal.   Left Ear: External ear normal.   Mouth/Throat: Oropharynx is clear and moist.   Neck: No thyromegaly present. Cardiovascular: Normal rate, regular rhythm, normal heart sounds and intact distal pulses. Pulmonary/Chest: Breath sounds normal. She has no wheezes. Decreased breath sounds rll   Abdominal: Soft. Bowel sounds are normal. She exhibits no distension and no mass. There is no tenderness. Musculoskeletal: She exhibits no edema. Lymphadenopathy:     She has no cervical adenopathy. Nursing note and vitals reviewed. ASSESSMENT and PLAN  Orders Placed This Encounter    US THORACENTESIS RT NDL W IMAGE    REFERRAL TO INTERVENTIONAL RADIOLOGY    ALPRAZolam (XANAX) 1 mg tablet    ondansetron (ZOFRAN ODT) 4 mg disintegrating tablet    morphine IR (MS IR) 15 mg tablet     Diagnoses and all orders for this visit:    1. Pleural effusion  -     REFERRAL TO INTERVENTIONAL RADIOLOGY    2. Anxiety  -     ALPRAZolam (XANAX) 1 mg tablet; TAKE ONE TABLET BY MOUTH TWICE DAILY AS NEEDED FOR ANXIETY    3. Other chest pain  -     morphine IR (MS IR) 15 mg tablet; 2 tabs po every 4-6 hours as needed for pain    4.  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 Malignant pleural effusion  -     US THORACENTESIS RT NDL W IMAGE; Future    Other orders  -     ondansetron (ZOFRAN ODT) 4 mg disintegrating tablet; Take 1 Tab by mouth every eight (8) hours as needed for Nausea. Follow-up Disposition:  Return in about 3 weeks (around 1/8/2019).

## 2018-12-18 NOTE — DISCHARGE INSTRUCTIONS
[facility]  Special Procedures/Radiology Department    Radiologist: Leslie Fuentes    Date: 12/18/18      Thoracentesis Discharge Instructions    You may have an aching pain in the puncture site tonight. Take Tylenol, as directed on the label, for pain or discomfort. Avoid ibuprofen (Advil, Motrin) and aspirin products for the next 48 hours as these drugs may cause you to bleed. Resume your previous diet and follow the medication reconciliation form. Rest for the next 24 hours. If you experience shortness of breath (other than your normal breathing pattern) difficulty breathing, or have severe chest pain, call 911 and go to the nearest Emergency Room.     Be sure to follow up with your referring physician as soon as possible

## 2018-12-18 NOTE — PROGRESS NOTES
Chief Complaint   Patient presents with    Shortness of Breath    Lung Cancer    ED Follow-up     BACK contusion   [address]    12/16/18    Annual Wellness Visit     1. Have you been to the ER, urgent care clinic since your last visit? Hospitalized since your last visit? No    2. Have you seen or consulted any other health care providers outside of the [address] [place] [address] since your last visit? Include any pap smears or colon screening.  No     Health Maintenance Due   Topic Date Due    Shingrix Vaccine Age 50> (1 of 2) 07/22/1983    HEMOGLOBIN A1C Q6M  04/18/2018    GLAUCOMA SCREENING Q2Y  06/08/2018    EYE EXAM RETINAL OR DILATED  06/08/2018    FOOT EXAM Q1  10/18/2018    MICROALBUMIN Q1  10/18/2018    LIPID PANEL Q1  10/18/2018    MEDICARE YEARLY EXAM  10/19/2018

## 2019-01-07 ENCOUNTER — HOSPITAL ENCOUNTER (OUTPATIENT)
Dept: ULTRASOUND IMAGING | Age: 84
Discharge: HOME | End: 2019-01-07
Attending: INTERNAL MEDICINE
Payer: MEDICARE

## 2019-01-07 ENCOUNTER — HOSPITAL ENCOUNTER (OUTPATIENT)
Dept: GENERAL RADIOLOGY | Age: 84
Discharge: HOME | End: 2019-01-07
Attending: STUDENT IN AN ORGANIZED HEALTH CARE EDUCATION/TRAINING PROGRAM
Payer: MEDICARE

## 2019-01-07 VITALS
DIASTOLIC BLOOD PRESSURE: 70 MMHG | HEART RATE: 84 BPM | TEMPERATURE: 98.6 F | RESPIRATION RATE: 20 BRPM | SYSTOLIC BLOOD PRESSURE: 112 MMHG | OXYGEN SATURATION: 97 %

## 2019-01-07 DIAGNOSIS — C79.51 SECONDARY MALIGNANT NEOPLASM OF BONE (HCC): ICD-10-CM

## 2019-01-07 DIAGNOSIS — J91.0 MALIGNANT PLEURAL EFFUSION: ICD-10-CM

## 2019-01-07 DIAGNOSIS — C34.11 MALIGNANT NEOPLASM OF UPPER LOBE, RIGHT BRONCHUS OR LUNG (HCC): ICD-10-CM

## 2019-01-07 PROCEDURE — 71045 X-RAY EXAM CHEST 1 VIEW: CPT

## 2019-01-07 PROCEDURE — 32555 ASPIRATE PLEURA W/ IMAGING: CPT

## 2019-01-07 NOTE — ROUTINE PROCESS
1600- Pt in for thoracentesis. Dr Dora Shell in to consult pt. Pt consented. 1625- 900ml red colored fluid removed per thoracentesis. Procedure stopped because of pt stating that procedure was causing discomfort and difficulty breathing although sats >98% RA.   1630- PCXR completed and Dr Dora Shell reviewed. MD updated of pt status and amount removed. Dtr at bedside. Dtr gave pt his own morphine po. Pt resting quietly. VSS.  1700- Pt discharged to home with dtr. Discharge instructions reviewed. VSS.  Pt ambulated to W/C with minimal assist.

## 2019-01-07 NOTE — DISCHARGE INSTRUCTIONS
[facility]  Special Procedures/Radiology Department    Radiologist:     Date:       Thoracentesis Discharge Instructions    You may have an aching pain in the puncture site tonight. Take Tylenol, as directed on the label, for pain or discomfort. Avoid ibuprofen (Advil, Motrin) and aspirin products for the next 48 hours as these drugs may cause you to bleed. Resume your previous diet and follow the medication reconciliation form. Rest for the next 24 hours. If you experience shortness of breath (other than your normal breathing pattern) difficulty breathing, or have severe chest pain, call 911 and go to the nearest Emergency Room.     Be sure to follow up with your referring physician as soon as possible

## 2019-01-15 ENCOUNTER — HOSPITAL ENCOUNTER (EMERGENCY)
Age: 84
Discharge: HOME | End: 2019-01-16
Attending: EMERGENCY MEDICINE
Payer: MEDICARE

## 2019-01-15 ENCOUNTER — APPOINTMENT (OUTPATIENT)
Dept: GENERAL RADIOLOGY | Age: 84
End: 2019-01-15
Attending: EMERGENCY MEDICINE
Payer: MEDICARE

## 2019-01-15 VITALS
HEIGHT: 67 IN | RESPIRATION RATE: 17 BRPM | OXYGEN SATURATION: 99 % | TEMPERATURE: 97.6 F | SYSTOLIC BLOOD PRESSURE: 130 MMHG | BODY MASS INDEX: 19.62 KG/M2 | HEART RATE: 96 BPM | DIASTOLIC BLOOD PRESSURE: 81 MMHG | WEIGHT: 125 LBS

## 2019-01-15 DIAGNOSIS — R63.4 WEIGHT LOSS: ICD-10-CM

## 2019-01-15 DIAGNOSIS — R62.7 FAILURE TO THRIVE IN ADULT: ICD-10-CM

## 2019-01-15 DIAGNOSIS — R53.83 FATIGUE, UNSPECIFIED TYPE: ICD-10-CM

## 2019-01-15 DIAGNOSIS — J90 PLEURAL EFFUSION: Primary | ICD-10-CM

## 2019-01-15 LAB
ALBUMIN SERPL-MCNC: 3 G/DL (ref 3.5–5)
ALBUMIN/GLOB SERPL: 0.9 {RATIO} (ref 1.1–2.2)
ALP SERPL-CCNC: 199 U/L (ref 45–117)
ALT SERPL-CCNC: 13 U/L (ref 12–78)
ANION GAP SERPL CALC-SCNC: 7 MMOL/L (ref 5–15)
APPEARANCE UR: CLEAR
AST SERPL-CCNC: 19 U/L (ref 15–37)
BACTERIA URNS QL MICRO: NEGATIVE /HPF
BASOPHILS # BLD: 0 K/UL (ref 0–0.1)
BASOPHILS NFR BLD: 1 % (ref 0–1)
BILIRUB SERPL-MCNC: 0.9 MG/DL (ref 0.2–1)
BILIRUB UR QL: NEGATIVE
BUN SERPL-MCNC: 22 MG/DL (ref 6–20)
BUN/CREAT SERPL: 28 (ref 12–20)
CALCIUM SERPL-MCNC: 7.2 MG/DL (ref 8.5–10.1)
CHLORIDE SERPL-SCNC: 100 MMOL/L (ref 97–108)
CO2 SERPL-SCNC: 26 MMOL/L (ref 21–32)
COLOR UR: ABNORMAL
CREAT SERPL-MCNC: 0.8 MG/DL (ref 0.7–1.3)
DIFFERENTIAL METHOD BLD: ABNORMAL
EOSINOPHIL # BLD: 0 K/UL (ref 0–0.4)
EOSINOPHIL NFR BLD: 0 % (ref 0–7)
EPITH CASTS URNS QL MICRO: ABNORMAL /LPF
ERYTHROCYTE [DISTWIDTH] IN BLOOD BY AUTOMATED COUNT: 18 % (ref 11.5–14.5)
GLOBULIN SER CALC-MCNC: 3.5 G/DL (ref 2–4)
GLUCOSE SERPL-MCNC: 96 MG/DL (ref 65–100)
GLUCOSE UR STRIP.AUTO-MCNC: NEGATIVE MG/DL
HCT VFR BLD AUTO: 34.2 % (ref 36.6–50.3)
HGB BLD-MCNC: 10.7 G/DL (ref 12.1–17)
HGB UR QL STRIP: NEGATIVE
HYALINE CASTS URNS QL MICRO: ABNORMAL /LPF (ref 0–5)
IMM GRANULOCYTES # BLD AUTO: 0 K/UL (ref 0–0.04)
IMM GRANULOCYTES NFR BLD AUTO: 1 % (ref 0–0.5)
KETONES UR QL STRIP.AUTO: 15 MG/DL
LEUKOCYTE ESTERASE UR QL STRIP.AUTO: NEGATIVE
LYMPHOCYTES # BLD: 0.6 K/UL (ref 0.8–3.5)
LYMPHOCYTES NFR BLD: 14 % (ref 12–49)
MCH RBC QN AUTO: 24.4 PG (ref 26–34)
MCHC RBC AUTO-ENTMCNC: 31.3 G/DL (ref 30–36.5)
MCV RBC AUTO: 77.9 FL (ref 80–99)
MONOCYTES # BLD: 0.4 K/UL (ref 0–1)
MONOCYTES NFR BLD: 10 % (ref 5–13)
NEUTS SEG # BLD: 3.4 K/UL (ref 1.8–8)
NEUTS SEG NFR BLD: 74 % (ref 32–75)
NITRITE UR QL STRIP.AUTO: NEGATIVE
NRBC # BLD: 0 K/UL (ref 0–0.01)
NRBC BLD-RTO: 0 PER 100 WBC
PH UR STRIP: 6.5 [PH] (ref 5–8)
PLATELET # BLD AUTO: 124 K/UL (ref 150–400)
PMV BLD AUTO: 9.9 FL (ref 8.9–12.9)
POTASSIUM SERPL-SCNC: 4.2 MMOL/L (ref 3.5–5.1)
PROT SERPL-MCNC: 6.5 G/DL (ref 6.4–8.2)
PROT UR STRIP-MCNC: 30 MG/DL
RBC # BLD AUTO: 4.39 M/UL (ref 4.1–5.7)
RBC #/AREA URNS HPF: ABNORMAL /HPF (ref 0–5)
RBC MORPH BLD: ABNORMAL
SODIUM SERPL-SCNC: 133 MMOL/L (ref 136–145)
SP GR UR REFRACTOMETRY: 1.03 (ref 1–1.03)
UA: UC IF INDICATED,UAUC: ABNORMAL
UROBILINOGEN UR QL STRIP.AUTO: 1 EU/DL (ref 0.2–1)
WBC # BLD AUTO: 4.4 K/UL (ref 4.1–11.1)
WBC URNS QL MICRO: ABNORMAL /HPF (ref 0–4)

## 2019-01-15 PROCEDURE — 71045 X-RAY EXAM CHEST 1 VIEW: CPT

## 2019-01-15 PROCEDURE — 81001 URINALYSIS AUTO W/SCOPE: CPT

## 2019-01-15 PROCEDURE — 99283 EMERGENCY DEPT VISIT LOW MDM: CPT

## 2019-01-15 PROCEDURE — 36415 COLL VENOUS BLD VENIPUNCTURE: CPT

## 2019-01-15 PROCEDURE — 85025 COMPLETE CBC W/AUTO DIFF WBC: CPT

## 2019-01-15 PROCEDURE — 74011250637 HC RX REV CODE- 250/637: Performed by: EMERGENCY MEDICINE

## 2019-01-15 PROCEDURE — 80053 COMPREHEN METABOLIC PANEL: CPT

## 2019-01-15 RX ORDER — ONDANSETRON 4 MG/1
8 TABLET, ORALLY DISINTEGRATING ORAL
Status: COMPLETED | OUTPATIENT
Start: 2019-01-15 | End: 2019-01-15

## 2019-01-15 RX ADMIN — ONDANSETRON 8 MG: 4 TABLET, ORALLY DISINTEGRATING ORAL at 23:00

## 2019-01-16 NOTE — DISCHARGE INSTRUCTIONS
Patient Education        Fatigue: Care Instructions  Your Care Instructions    Fatigue is a feeling of tiredness, exhaustion, or lack of energy. You may feel fatigue because of too much or not enough activity. It can also come from stress, lack of sleep, boredom, and poor diet. Many medical problems, such as viral infections, can cause fatigue. Emotional problems, especially depression, are often the cause of fatigue. Fatigue is most often a symptom of another problem. Treatment for fatigue depends on the cause. For example, if you have fatigue because you have a certain health problem, treating this problem also treats your fatigue. If depression or anxiety is the cause, treatment may help. Follow-up care is a key part of your treatment and safety. Be sure to make and go to all appointments, and call your doctor if you are having problems. It's also a good idea to know your test results and keep a list of the medicines you take. How can you care for yourself at home? · Get regular exercise. But don't overdo it. Go back and forth between rest and exercise. · Get plenty of rest.  · Eat a healthy diet. Do not skip meals, especially breakfast.  · Reduce your use of caffeine, tobacco, and alcohol. Caffeine is most often found in coffee, tea, cola drinks, and chocolate. · Limit medicines that can cause fatigue. This includes tranquilizers and cold and allergy medicines. When should you call for help? Watch closely for changes in your health, and be sure to contact your doctor if:    · You have new symptoms such as fever or a rash.     · Your fatigue gets worse.     · You have been feeling down, depressed, or hopeless. Or you may have lost interest in things that you usually enjoy.     · You are not getting better as expected. Where can you learn more? Go to http://bird-radha.info/. Enter P135 in the search box to learn more about \"Fatigue: Care Instructions. \"  Current as of: November 20, 2017  Content Version: 11.8  © 1088-0613 [employer]. Care instructions adapted under license by [employer] (which disclaims liability or warranty for this information). If you have questions about a medical condition or this instruction, always ask your healthcare professional. [address] any warranty or liability for your use of this information. Patient Education        Learning About a Pleural Effusion  What is it? A pleural effusion (say \"PLER-maria victoria ko-RYMK-hlur\") is the buildup of fluid in the space between tissues lining the lungs and the chest wall. Because of the fluid buildup, the lungs may not be able to expand completely. This can make it hard to breathe. A pleural empyema (say \"zj-mx-SB-muh\") is a problem that can happen with pleural effusion. Bacteria or other infections cause pus to form in the pleural fluid. But most pleural effusions don't become infected. What causes it? A pleural effusion has many causes. They include pneumonia, cancer, inflammation of the tissues around the lungs, and heart failure. What are the symptoms? Symptoms of a pleural effusion may include:  · Trouble breathing. · Shortness of breath. · Chest pain. · Fever. · A cough. A minor pleural effusion may not cause any symptoms. How is it diagnosed? A pleural effusion is usually diagnosed with an X-ray and a physical exam. The doctor listens to the airflow in your lungs. How is it treated? A pleural effusion can be treated by removing fluid from the space between the tissues around the lungs. This is done with a needle that's put into the chest (thoracentesis). A small amount of the fluid may be sent to a lab to find out what is causing the buildup of fluid. Removing the fluid may help to relieve symptoms, such as shortness of breath and chest pain. It can help the lungs to expand more fully.   If the pleural effusion doesn't get better, a catheter may be placed in the chest. This is a flexible tube that allows fluid to drain from the lungs. The catheter stays in the chest until the doctor removes it. Some people may get a treatment that removes the fluid and then puts a medicine into the chest cavity. This helps to prevent too much fluid from building up again. A minor pleural effusion often goes away on its own. Doctors may need to treat the condition that is causing the pleural effusion. For example, you may get medicines to treat pneumonia or congestive heart failure. When the condition is treated, the effusion usually goes away. For a pleural empyema, the pus needs to be drained. It may drain from a flexible tube placed in the chest. Or you may have surgery to drain it. You also will get antibiotics. Follow-up care is a key part of your treatment and safety. Be sure to make and go to all appointments, and call your doctor if you are having problems. It's also a good idea to know your test results and keep a list of the medicines you take. Where can you learn more? Go to http://bird-radha.info/. Enter A920 in the search box to learn more about \"Learning About a Pleural Effusion. \"  Current as of: June 18, 2018  Content Version: 11.8  © 7924-1754 [employer]. Care instructions adapted under license by [employer] (which disclaims liability or warranty for this information). If you have questions about a medical condition or this instruction, always ask your healthcare professional. [place] [address] any warranty or liability for your use of this information. Patient Education        Thoracentesis: Before Your Procedure  What is thoracentesis? Thoracentesis (say \"rvko-kr-bwt-JUSTO-sis\") is a procedure to remove fluid from the space between the lungs and the chest wall. This is called the pleural space. The procedure may also be called a \"chest tap. \"  It is normal to have a small amount of fluid in the pleural space. But too much fluid can build up because of problems such as infection, heart failure, and lung cancer. The procedure may be done to help with shortness of breath and pain caused by the fluid buildup. Or you may have it done so the doctor can test the fluid to find the cause of the buildup. Your doctor will put a long, thin needle or a thin plastic tube, called a catheter, between two of your ribs. The doctor will use the needle or catheter to take fluid out. You may get medicine before the procedure. This helps with pain and helps you relax. The procedure will take about 15 minutes. Most people go home shortly after. You can go back to work or your normal activities as soon as you feel up to it. If the doctor sends the fluid to a lab for testing, it usually takes a few hours to get the results. Some of the test results may take a few days. The doctor or nurse will discuss the results with you. Follow-up care is a key part of your treatment and safety. Be sure to make and go to all appointments, and call your doctor if you are having problems. It's also a good idea to know your test results and keep a list of the medicines you take. What happens before the procedure?   Preparing for the procedure    · Understand exactly what procedure is planned, along with the risks, benefits, and other options. · Tell your doctors ALL the medicines, vitamins, supplements, and herbal remedies you take. Some of these can increase the risk of bleeding or interact with anesthesia.     · If you take blood thinners, such as warfarin (Coumadin), clopidogrel (Plavix), or aspirin, be sure to talk to your doctor. He or she will tell you if you should stop taking these medicines before your procedure. Make sure that you understand exactly what your doctor wants you to do.     · Your doctor will tell you which medicines to take or stop before your procedure.  You may need to stop taking certain medicines a week or more before your procedure. So talk to your doctor as soon as you can.     · If you have an advance directive, let your doctor know. It may include a living will and a durable power of  for health care. Bring a copy to the hospital. If you don't have one, you may want to prepare one. It lets your doctor and loved ones know your health care wishes. Doctors advise that everyone prepare these papers before any type of surgery or procedure. Procedures can be stressful. This information will help you understand what you can expect. And it will help you safely prepare for your procedure. What happens on the day of the procedure?    · Take off all jewelry and piercings.    At the hospital, surgery center, or doctor's office   · Bring a picture ID.     · The doctor may take a chest X-ray or use ultrasound or CT scan pictures to help find the exact spot where fluid has built up.     · The doctor will give you a shot of numbing medicine in the skin where the needle or catheter will go.     · The procedure will take about 15 minutes.     · The doctor may take a chest X-ray after the procedure. Going home  · You may need someone to drive you home. · You will be given more specific instructions about recovering from your procedure. When should you call your doctor? · You have questions or concerns.     · You don't understand how to prepare for your procedure.     · You become ill before the procedure (such as fever, flu, or a cold).     · You need to reschedule or have changed your mind about having the procedure. Where can you learn more? Go to http://bird-radha.info/. Enter I536 in the search box to learn more about \"Thoracentesis: Before Your Procedure. \"  Current as of: December 6, 2017  Content Version: 11.8  © 0404-7754 [employer], [employer]. Care instructions adapted under license by [employer] (which disclaims liability or warranty for this information).  If you have questions about a medical condition or this instruction, always ask your healthcare professional. [address] any warranty or liability for your use of this information. Patient Education        Abnormal Weight Loss: Care Instructions  Your Care Instructions    There are two types of weight loss--normal and abnormal. The normal kind happens when you are trying to lose weight by exercising more or eating less. The abnormal kind happens when you are not trying to lose weight. Many medical problems can cause abnormal weight loss. These include problems with your thyroid gland, long-term infections, mouth or throat problems that make it hard to eat, and digestive problems. They also include depression and cancer. Some medicines also may cause you to lose weight. You can work with your doctor to find the cause of your weight loss. You will probably need tests to do this. Follow-up care is a key part of your treatment and safety. Be sure to make and go to all appointments, and call your doctor if you are having problems. It's also a good idea to know your test results and keep a list of the medicines you take. How can you care for yourself at home? · Weigh yourself at the same time every day. It's best to do it first thing in the morning after you empty your bladder. Be sure to always wear the same amount of clothing. · Write down any changes in your weight and the possible causes. Discuss these with your doctor. · Your doctor may want you to change your diet. Do your best to follow his or her advice. · Ask your doctor if you should see a dietitian. This is a person who can help you plan meals that work best for your lifestyle. · Note any changes in bowel habits. These may include changes in how often you have a bowel movement. Other changes include the color and size of your stools and how solid they are. · If you are prescribed medicines, take them exactly as prescribed.  Call your doctor if you think you are having a problem with your medicine. You will get more details on the specific medicines your doctor prescribes. When should you call for help? Watch closely for changes in your health, and be sure to contact your doctor if:    · You do not get better as expected.     · You continue to lose weight. Where can you learn more? Go to http://bird-radha.info/. Enter A790 in the search box to learn more about \"Abnormal Weight Loss: Care Instructions. \"  Current as of: June 26, 2018  Content Version: 11.8  © 6149-6844 [employer]. Care instructions adapted under license by [employer] (which disclaims liability or warranty for this information). If you have questions about a medical condition or this instruction, always ask your healthcare professional. [address] any warranty or liability for your use of this information.

## 2019-01-16 NOTE — ED NOTES
Dr. Ish Rudolph reviewed discharge instructions with the patient. The patient verbalized understanding. All questions and concerns were addressed. The patient declined a wheelchair and is discharged ambulatory in the care of family members with instructions and prescriptions in hand. Pt is alert and oriented x 4. Respirations are clear and unlabored.

## 2019-01-16 NOTE — ED PROVIDER NOTES
EMERGENCY DEPARTMENT HISTORY AND PHYSICAL EXAM 
 
 
Date: 1/15/2019 Patient Name: Danna Paz History of Presenting Illness Chief Complaint Patient presents with  Decreased Appetite  
  x couple days. \"i have lung cancer and i think they might need to drain my right lung. \" currently taking chemo pill  Fatigue History Provided By: Patient HPI: Danna Paz, 80 y.o. adult with PMHx significant for HTN, DM, lung CA, gout, presents ambulatory to the ED for evaluation of mild to moderate fatigue with associated decreased appetite x a few days that worsened today. He denies any alleviating or exacerbating factors. Pt notes he took his prescribed morphine shortly PTA. Pt states he has been on a chemo medication x 90 days. He also notes weight loss over the past 90 days and states he is not taking his Megace. He reports bilateral LE swelling and episodes of NV with his CA. He denies taking blood thinners. Pt specifically denies any fever, chills, abd pain, CP, SOB. PCP: Ashwini Khoury MD 
 
No current facility-administered medications on file prior to encounter. Current Outpatient Medications on File Prior to Encounter Medication Sig Dispense Refill  morphine IR (MS IR) 15 mg tablet 2 tabs po every 4-6 hours as needed for pain 90 Tab 0  ALPRAZolam (XANAX) 1 mg tablet TAKE ONE TABLET BY MOUTH TWICE DAILY AS NEEDED FOR ANXIETY 60 Tab 3  
 escitalopram oxalate (LEXAPRO) 10 mg tablet Take 1 Tab by mouth daily. 30 Tab 5  
 ondansetron (ZOFRAN ODT) 4 mg disintegrating tablet Take 1 Tab by mouth every eight (8) hours as needed for Nausea. 30 Tab 5  polyethylene glycol (MIRALAX) 17 gram/dose powder Take 17 g by mouth two (2) times a day. 850 g 5  
 megestrol (MEGACE) 20 mg tablet Take 1 Tab by mouth daily. For appetite 30 Tab 5  terazosin (HYTRIN) 10 mg capsule TAKE ONE CAPSULE BY MOUTH ONCE DAILY FOR BLOOD PRESSURE AND PROSTATE 90 Cap 12  pravastatin (PRAVACHOL) 20 mg tablet TAKE ONE TABLET BY MOUTH ONCE DAILY AT  NIGHT  FOR  CHOLESTEROL 90 Tab 3  piroxicam (FELDENE) 20 mg capsule Take 1 Cap by mouth daily. For pain 30 Cap 5  
 gabapentin (NEURONTIN) 300 mg capsule One tab po qhs- 1st week. Then 1 tab po bid- 2nd week. Then 1 tab po tid. For pain in feet 90 Cap 5  
 allopurinol (ZYLOPRIM) 300 mg tablet TAKE ONE TABLET BY MOUTH ONCE DAILY TO PREVENT GOUT 90 Tab 12  
 atenolol (TENORMIN) 50 mg tablet TAKE 1/2  TABLET BY MOUTH TWICE DAILY FOR BLOOD PRESSURE 180 Tab 3  cetirizine (ZYRTEC) 10 mg tablet Take 1 Tab by mouth daily. 15 Tab 5  
 finasteride (PROSCAR) 5 mg tablet Take 1 Tab by mouth daily. For prostate 30 Tab 12  
 VIT C/E/ZN/COPPR/LUTEIN/ZEAXAN (PRESERVISION AREDS 2 PO) Take  by mouth daily.  Cholecalciferol, Vitamin D3, (VITAMIN D3) 1,000 unit Cap Take 1 Tab by mouth daily.  MULTIVITAMINS (MULTIPLE VITAMINS PO) Take 1 Tab by mouth daily. Past History Past Medical History: 
Past Medical History:  
Diagnosis Date  BP (high blood pressure) 2010  Cancer ([facility] [address] Lung  Diabetes mellitus, type 2 ([facility] [address]  Elevated PSA 2010  Elevated PSA   
 has had 2 biopsies, both were negative for cancer  Gout 2010 Past Surgical History: 
Past Surgical History:  
Procedure Laterality Date  CHEST SURGERY PROCEDURE UNLISTED Previous thoracenteses  HX COLONOSCOPY    
 HX HERNIA REPAIR Family History: 
Family History Problem Relation Age of Onset  Diabetes Brother Social History: 
Social History Tobacco Use  Smoking status: Former Smoker Last attempt to quit: 1965 Years since quittin.0  Smokeless tobacco: Never Used  Tobacco comment: 2554 Substance Use Topics  Alcohol use: No  
 Drug use: No  
 
 
Allergies: Allergies Allergen Reactions  Ciprofloxacin Other (comments) Nauseated, felt fuzzy  Erythromycin Unknown (comments)  Pcn [Penicillins] Swelling Review of Systems Review of Systems Constitutional: Positive for appetite change (decreased) and fatigue. Negative for chills and fever. HENT: Negative for congestion and sore throat. Eyes: Negative for visual disturbance. Respiratory: Negative for cough and shortness of breath. Cardiovascular: Negative for chest pain and leg swelling. Gastrointestinal: Negative for abdominal pain, blood in stool, diarrhea and nausea. Endocrine: Negative for polyuria. Genitourinary: Negative for dysuria and flank pain. Musculoskeletal: Negative for myalgias. Skin: Negative for rash. Allergic/Immunologic: Negative for immunocompromised state. Neurological: Negative for weakness and headaches. Psychiatric/Behavioral: Negative for confusion. Physical Exam  
Physical Exam  
Constitutional: She is oriented to person, place, and time. Frail appearing HENT:  
Head: Normocephalic and atraumatic. Moist mucous membranes Eyes: Conjunctivae are normal. Pupils are equal, round, and reactive to light. Right eye exhibits no discharge. Left eye exhibits no discharge. Neck: Normal range of motion. Neck supple. No tracheal deviation present. Cardiovascular: Normal rate, regular rhythm and normal heart sounds. No murmur heard. Pulmonary/Chest: Effort normal. No respiratory distress. She has no wheezes. She has no rales. Decreased breath sounds on R Abdominal: Soft. Bowel sounds are normal. There is no tenderness. There is no rebound and no guarding. Musculoskeletal: Normal range of motion. She exhibits no edema, tenderness or deformity. Neurological: She is alert and oriented to person, place, and time. Skin: Skin is warm and dry. No rash noted. No erythema. Psychiatric: Her behavior is normal.  
Nursing note and vitals reviewed. Diagnostic Study Results Labs - Recent Results (from the past 12 hour(s)) CBC WITH AUTOMATED DIFF Collection Time: 01/15/19  8:56 PM  
Result Value Ref Range WBC 4.4 4.1 - 11.1 K/uL  
 RBC 4.39 4. 10 - 5.70 M/uL  
 HGB 10.7 (L) 12.1 - 17.0 g/dL HCT 34.2 (L) 36.6 - 50.3 % MCV 77.9 (L) 80.0 - 99.0 FL  
 MCH 24.4 (L) 26.0 - 34.0 PG  
 MCHC 31.3 30.0 - 36.5 g/dL  
 RDW 18.0 (H) 11.5 - 14.5 % PLATELET 089 (L) 080 - 400 K/uL MPV 9.9 8.9 - 12.9 FL  
 NRBC 0.0 0  WBC ABSOLUTE NRBC 0.00 0.00 - 0.01 K/uL NEUTROPHILS 74 32 - 75 % LYMPHOCYTES 14 12 - 49 % MONOCYTES 10 5 - 13 % EOSINOPHILS 0 0 - 7 % BASOPHILS 1 0 - 1 % IMMATURE GRANULOCYTES 1 (H) 0.0 - 0.5 % ABS. NEUTROPHILS 3.4 1.8 - 8.0 K/UL  
 ABS. LYMPHOCYTES 0.6 (L) 0.8 - 3.5 K/UL  
 ABS. MONOCYTES 0.4 0.0 - 1.0 K/UL  
 ABS. EOSINOPHILS 0.0 0.0 - 0.4 K/UL  
 ABS. BASOPHILS 0.0 0.0 - 0.1 K/UL  
 ABS. IMM. GRANS. 0.0 0.00 - 0.04 K/UL  
 DF AUTOMATED    
 RBC COMMENTS ANISOCYTOSIS 
1+ METABOLIC PANEL, COMPREHENSIVE Collection Time: 01/15/19  8:56 PM  
Result Value Ref Range Sodium 133 (L) 136 - 145 mmol/L Potassium 4.2 3.5 - 5.1 mmol/L Chloride 100 97 - 108 mmol/L  
 CO2 26 21 - 32 mmol/L Anion gap 7 5 - 15 mmol/L Glucose 96 65 - 100 mg/dL BUN 22 (H) 6 - 20 MG/DL Creatinine 0.80 0.70 - 1.30 MG/DL  
 BUN/Creatinine ratio 28 (H) 12 - 20 GFR est AA >60 >60 ml/min/1.73m2 GFR est non-AA >60 >60 ml/min/1.73m2 Calcium 7.2 (L) 8.5 - 10.1 MG/DL Bilirubin, total 0.9 0.2 - 1.0 MG/DL  
 ALT (SGPT) 13 12 - 78 U/L  
 AST (SGOT) 19 15 - 37 U/L Alk. phosphatase 199 (H) 45 - 117 U/L Protein, total 6.5 6.4 - 8.2 g/dL Albumin 3.0 (L) 3.5 - 5.0 g/dL Globulin 3.5 2.0 - 4.0 g/dL A-G Ratio 0.9 (L) 1.1 - 2.2 URINALYSIS W/ REFLEX CULTURE Collection Time: 01/15/19  8:56 PM  
Result Value Ref Range Color YELLOW/STRAW Appearance CLEAR CLEAR Specific gravity 1.027 1.003 - 1.030    
 pH (UA) 6.5 5.0 - 8.0 Protein 30 (A) NEG mg/dL Glucose NEGATIVE  NEG mg/dL Ketone 15 (A) NEG mg/dL Bilirubin NEGATIVE  NEG Blood NEGATIVE  NEG Urobilinogen 1.0 0.2 - 1.0 EU/dL Nitrites NEGATIVE  NEG Leukocyte Esterase NEGATIVE  NEG    
 WBC 0-4 0 - 4 /hpf  
 RBC 0-5 0 - 5 /hpf Epithelial cells FEW FEW /lpf Bacteria NEGATIVE  NEG /hpf  
 UA:UC IF INDICATED CULTURE NOT INDICATED BY UA RESULT CNI Hyaline cast 0-2 0 - 5 /lpf Radiologic Studies -  
XR CHEST PORT Final Result IMPRESSION: Unchanged right hydropneumothorax. CT Results  (Last 48 hours) None CXR Results  (Last 48 hours) 01/15/19 2304  XR CHEST PORT Final result Impression:  IMPRESSION: Unchanged right hydropneumothorax. Narrative:  EXAM: XR CHEST PORT INDICATION: dyspnea COMPARISON: January 7 FINDINGS: A portable AP radiograph of the chest was obtained at 2244 hours. The  
patient is on a cardiac monitor. There is complete opacification of the right  
hemithorax as seen on the prior examination compatible with a hydropneumothorax  
without change. There is atherosclerosis of the aorta. The bones and soft  
tissues are grossly within normal limits. Medical Decision Making I am the first provider for this patient. I reviewed the vital signs, available nursing notes, past medical history, past surgical history, family history and social history. Vital Signs-Reviewed the patient's vital signs. Patient Vitals for the past 12 hrs: 
 Temp Pulse Resp BP SpO2  
01/15/19 1948 97.6 °F (36.4 °C) 96 17 130/81 99 % Pulse Oximetry Analysis - 98% on RA Cardiac Monitor:  
Rate: 96 bpm 
 
Records Reviewed: Nursing Notes, Old Medical Records, Previous electrocardiograms, Previous Radiology Studies and Previous Laboratory Studies Provider Notes (Medical Decision Making):  
Failure to thrive in pt with lung CA, no acute process on labs, CXR unchanged, does not appear that pt needs hospitalization at this time. Likely needs a radha discussion with oncologist regarding symptomatic management and further goals of care. ED Course:  
Initial assessment performed. The patients presenting problems have been discussed, and they are in agreement with the care plan formulated and outlined with them. I have encouraged them to ask questions as they arise throughout their visit. Consult Note: 
10:38 PM 
Bernadine Cochran DO spoke with [facility] on call Specialty: Oncology Discussed pts hx, disposition, and available diagnostic and imaging results. Reviewed care plans. States pt can call office in the morning for close follow up. 11:45 PM 
Pt reevaluated. Pt updated on lab and imaging results. Advised pt to call [facility] in the morning for closer follow up appointment Critical Care Time:  
none Disposition: 
DISCHARGE 
11:53 PM 
The patient has been re-evaluated and is ready for discharge. Reviewed available results with patient. Counseled pt on diagnosis and care plan. Pt has expressed understanding, and all questions have been answered. Pt agrees with plan and agrees to follow up as recommended, or return to the ED if their symptoms worsen. Discharge instructions have been provided and explained to the pt, along with reasons to return to the ED. PLAN: 
1. Current Discharge Medication List  
  
 
2. Follow-up Information Follow up With Specialties Details Why Contact Info Nancy Draper MD Family Practice  As needed [address] [place], [place] [address]S [place] [id number] [phone] Derick Au MD Hematology and Oncology, Hematology, Oncology Schedule an appointment as soon as possible for a visit Call tomorrow for her next available appointment [address] [place] [address] 
[phone] Return to ED if worse Diagnosis Clinical Impression: 1. Pleural effusion 2. Fatigue, unspecified type 3. Failure to thrive in adult 4. Weight loss Attestations: This note is prepared by Alcira Sanders, acting as Scribe for Bernadine Cochran DO. Bernadine Cochran DO: The scribe's documentation has been prepared under my direction and personally reviewed by me in its entirety. I confirm that the note above accurately reflects all work, treatment, procedures, and medical decision making performed by me.

## 2019-01-22 ENCOUNTER — HOSPITAL ENCOUNTER (OUTPATIENT)
Dept: ULTRASOUND IMAGING | Age: 84
Discharge: HOME | End: 2019-01-22
Attending: INTERNAL MEDICINE
Payer: MEDICARE

## 2019-01-22 ENCOUNTER — HOSPITAL ENCOUNTER (OUTPATIENT)
Dept: GENERAL RADIOLOGY | Age: 84
Discharge: HOME | End: 2019-01-22
Attending: RADIOLOGY
Payer: MEDICARE

## 2019-01-22 VITALS
SYSTOLIC BLOOD PRESSURE: 119 MMHG | TEMPERATURE: 97.7 F | HEIGHT: 67 IN | DIASTOLIC BLOOD PRESSURE: 65 MMHG | OXYGEN SATURATION: 97 % | BODY MASS INDEX: 19.62 KG/M2 | WEIGHT: 125 LBS | RESPIRATION RATE: 16 BRPM | HEART RATE: 83 BPM

## 2019-01-22 DIAGNOSIS — J91.0 MALIGNANT PLEURAL EFFUSION: ICD-10-CM

## 2019-01-22 DIAGNOSIS — C34.11 MALIGNANT NEOPLASM OF UPPER LOBE OF RIGHT LUNG (HCC): ICD-10-CM

## 2019-01-22 DIAGNOSIS — C79.51 SECONDARY MALIGNANT NEOPLASM OF BONE (HCC): ICD-10-CM

## 2019-01-22 LAB — INR BLD: 1.1 (ref 0.9–1.2)

## 2019-01-22 PROCEDURE — 74011250636 HC RX REV CODE- 250/636: Performed by: RADIOLOGY

## 2019-01-22 PROCEDURE — 32555 ASPIRATE PLEURA W/ IMAGING: CPT

## 2019-01-22 PROCEDURE — 71045 X-RAY EXAM CHEST 1 VIEW: CPT

## 2019-01-22 PROCEDURE — 85610 PROTHROMBIN TIME: CPT

## 2019-01-22 RX ORDER — LIDOCAINE HYDROCHLORIDE 10 MG/ML
10 INJECTION, SOLUTION EPIDURAL; INFILTRATION; INTRACAUDAL; PERINEURAL
Status: COMPLETED | OUTPATIENT
Start: 2019-01-22 | End: 2019-01-22

## 2019-01-22 RX ADMIN — LIDOCAINE HYDROCHLORIDE 10 ML: 10 INJECTION, SOLUTION EPIDURAL; INFILTRATION; INTRACAUDAL; PERINEURAL at 16:00

## 2019-01-22 NOTE — ROUTINE PROCESS
To pov via w/c. In NAD at time of d/c received + verbal feedback with the instructions provided. Dressing clean dry and intact.

## 2019-01-22 NOTE — DISCHARGE INSTRUCTIONS
[address] [place] [address],[address]  Radiology Department  [phone]    Radiologist: Dr Talisha Stauffer    Date: 01/22/2019      Thoracentesis Discharge Instructions    You may have an aching pain in the puncture site tonight as the numbing medicine wears off. You may take Tylenol, as directed on the label, for pain or discomfort. Avoid ibuprofen (Advil, Motrin) and aspirin products for the next 48 hours as these drugs may increase your chance of bleeding. You have develop a mild cough as the lungs re expand with air, this should resolve with in the next 24 hours. Resume your previous diet and continue your prescribed medicaitons. Rest for the next 24 hours. If you experience shortness of breath (other than your normal breathing pattern) difficulty breathing, or have severe chest pain, call 911 and go to the nearest Emergency Room.     Be sure to follow up with your referring physician as soon as possible

## 2019-01-29 ENCOUNTER — HOSPITAL ENCOUNTER (OUTPATIENT)
Dept: LAB | Age: 84
Discharge: HOME | End: 2019-01-29
Payer: MEDICARE

## 2019-01-29 ENCOUNTER — OFFICE VISIT (OUTPATIENT)
Dept: FAMILY MEDICINE CLINIC | Age: 84
End: 2019-01-29

## 2019-01-29 ENCOUNTER — DOCUMENTATION ONLY (OUTPATIENT)
Dept: FAMILY MEDICINE CLINIC | Age: 84
End: 2019-01-29

## 2019-01-29 VITALS
OXYGEN SATURATION: 91 % | RESPIRATION RATE: 14 BRPM | TEMPERATURE: 97.6 F | HEART RATE: 100 BPM | SYSTOLIC BLOOD PRESSURE: 108 MMHG | WEIGHT: 121.4 LBS | BODY MASS INDEX: 19.06 KG/M2 | HEIGHT: 67 IN | DIASTOLIC BLOOD PRESSURE: 69 MMHG

## 2019-01-29 DIAGNOSIS — R13.19 OTHER DYSPHAGIA: ICD-10-CM

## 2019-01-29 DIAGNOSIS — J90 RECURRENT RIGHT PLEURAL EFFUSION: ICD-10-CM

## 2019-01-29 DIAGNOSIS — C34.11 MALIGNANT NEOPLASM OF UPPER LOBE OF RIGHT LUNG (HCC): ICD-10-CM

## 2019-01-29 DIAGNOSIS — R06.02 SHORTNESS OF BREATH: Primary | ICD-10-CM

## 2019-01-29 DIAGNOSIS — R60.9 EDEMA, UNSPECIFIED TYPE: ICD-10-CM

## 2019-01-29 LAB
BILIRUB UR QL STRIP: NORMAL
GLUCOSE UR-MCNC: NEGATIVE MG/DL
KETONES P FAST UR STRIP-MCNC: NORMAL MG/DL
PH UR STRIP: 5.5 [PH] (ref 4.6–8)
PROT UR QL STRIP: NORMAL
SP GR UR STRIP: 1.01 (ref 1–1.03)
UA UROBILINOGEN AMB POC: NORMAL (ref 0.2–1)
URINALYSIS CLARITY POC: CLEAR
URINALYSIS COLOR POC: NORMAL
URINE BLOOD POC: NEGATIVE
URINE LEUKOCYTES POC: NEGATIVE
URINE NITRITES POC: NEGATIVE

## 2019-01-29 PROCEDURE — 80053 COMPREHEN METABOLIC PANEL: CPT

## 2019-01-29 PROCEDURE — 36415 COLL VENOUS BLD VENIPUNCTURE: CPT

## 2019-01-29 RX ORDER — FUROSEMIDE 40 MG/1
TABLET ORAL
Qty: 90 TAB | Refills: 3 | Status: ON HOLD | OUTPATIENT
Start: 2019-01-29 | End: 2019-02-01 | Stop reason: SDUPTHER

## 2019-01-29 NOTE — PROGRESS NOTES
Pt scheduled for barium swallow tomorrow at [facility] at 1030. Pt to arrived at 10am. Also pt having a US thoracentesis at 130p pt to arrive at 1p. Pt to be NPO after midnight .  Daughter informed and verbalized understanding

## 2019-01-29 NOTE — PROGRESS NOTES
Chief Complaint   Patient presents with   [address] ED Follow-up     drained right lung last week [facility]     1. Have you been to the ER, urgent care clinic since your last visit? Hospitalized since your last visit? No    2. Have you seen or consulted any other health care providers outside of the [address] [place] [address] since your last visit? Include any pap smears or colon screening.  No     Health Maintenance Due   Topic Date Due    Shingrix Vaccine Age 50> (1 of 2) 07/22/1983    HEMOGLOBIN A1C Q6M  04/18/2018    GLAUCOMA SCREENING Q2Y  06/08/2018    EYE EXAM RETINAL OR DILATED  06/08/2018    FOOT EXAM Q1  10/18/2018    MICROALBUMIN Q1  10/18/2018    LIPID PANEL Q1  10/18/2018    MEDICARE YEARLY EXAM  10/19/2018

## 2019-01-30 ENCOUNTER — APPOINTMENT (OUTPATIENT)
Dept: GENERAL RADIOLOGY | Age: 84
DRG: 186 | End: 2019-01-30
Attending: FAMILY MEDICINE
Payer: MEDICARE

## 2019-01-30 ENCOUNTER — HOSPITAL ENCOUNTER (INPATIENT)
Dept: ULTRASOUND IMAGING | Age: 84
LOS: 2 days | Discharge: HOSPICE | DRG: 186 | End: 2019-02-01
Attending: FAMILY MEDICINE | Admitting: FAMILY MEDICINE
Payer: MEDICARE

## 2019-01-30 ENCOUNTER — HOSPITAL ENCOUNTER (OUTPATIENT)
Dept: INTERVENTIONAL RADIOLOGY/VASCULAR | Age: 84
Discharge: HOME | DRG: 186 | End: 2019-01-30
Attending: FAMILY MEDICINE | Admitting: FAMILY MEDICINE
Payer: MEDICARE

## 2019-01-30 DIAGNOSIS — R06.02 SHORTNESS OF BREATH: ICD-10-CM

## 2019-01-30 DIAGNOSIS — J94.8 HYDROPNEUMOTHORAX: ICD-10-CM

## 2019-01-30 DIAGNOSIS — J90 RECURRENT RIGHT PLEURAL EFFUSION: ICD-10-CM

## 2019-01-30 PROBLEM — Z96.89 CHEST TUBE IN PLACE: Status: ACTIVE | Noted: 2019-01-30

## 2019-01-30 LAB
ALBUMIN SERPL-MCNC: 3.4 G/DL (ref 3.5–4.7)
ALBUMIN/GLOB SERPL: 1.3 {RATIO} (ref 1.2–2.2)
ALP SERPL-CCNC: 222 IU/L (ref 39–117)
ALT SERPL-CCNC: 7 IU/L (ref 0–44)
AST SERPL-CCNC: 20 IU/L (ref 0–40)
BILIRUB SERPL-MCNC: 0.7 MG/DL (ref 0–1.2)
BUN SERPL-MCNC: 24 MG/DL (ref 8–27)
BUN/CREAT SERPL: 30 (ref 10–24)
CALCIUM SERPL-MCNC: 7.3 MG/DL (ref 8.6–10.2)
CHLORIDE SERPL-SCNC: 100 MMOL/L (ref 96–106)
CO2 SERPL-SCNC: 20 MMOL/L (ref 20–29)
CREAT SERPL-MCNC: 0.79 MG/DL (ref 0.76–1.27)
GLOBULIN SER CALC-MCNC: 2.7 G/DL (ref 1.5–4.5)
GLUCOSE SERPL-MCNC: 108 MG/DL (ref 65–99)
POTASSIUM SERPL-SCNC: 5 MMOL/L (ref 3.5–5.2)
PROT SERPL-MCNC: 6.1 G/DL (ref 6–8.5)
SODIUM SERPL-SCNC: 140 MMOL/L (ref 134–144)

## 2019-01-30 PROCEDURE — C1769 GUIDE WIRE: HCPCS

## 2019-01-30 PROCEDURE — C1894 INTRO/SHEATH, NON-LASER: HCPCS

## 2019-01-30 PROCEDURE — 32557 INSERT CATH PLEURA W/ IMAGE: CPT

## 2019-01-30 PROCEDURE — 65270000015 HC RM PRIVATE ONCOLOGY

## 2019-01-30 PROCEDURE — 77030031139 HC SUT VCRL2 J&J -A

## 2019-01-30 PROCEDURE — 77030002996 HC SUT SLK J&J -A

## 2019-01-30 PROCEDURE — 77030012390 HC DRN CHST BTL GTNG -B

## 2019-01-30 PROCEDURE — 77030011229 HC DIL VESL COON COOK -A

## 2019-01-30 PROCEDURE — C1729 CATH, DRAINAGE: HCPCS

## 2019-01-30 PROCEDURE — 76604 US EXAM CHEST: CPT

## 2019-01-30 PROCEDURE — 74011250637 HC RX REV CODE- 250/637: Performed by: FAMILY MEDICINE

## 2019-01-30 PROCEDURE — 74011250636 HC RX REV CODE- 250/636: Performed by: STUDENT IN AN ORGANIZED HEALTH CARE EDUCATION/TRAINING PROGRAM

## 2019-01-30 PROCEDURE — 0W9930Z DRAINAGE OF RIGHT PLEURAL CAVITY WITH DRAINAGE DEVICE, PERCUTANEOUS APPROACH: ICD-10-PCS | Performed by: STUDENT IN AN ORGANIZED HEALTH CARE EDUCATION/TRAINING PROGRAM

## 2019-01-30 PROCEDURE — 99218 HC RM OBSERVATION: CPT

## 2019-01-30 RX ORDER — ALPRAZOLAM 0.5 MG/1
1 TABLET ORAL 2 TIMES DAILY
Status: DISCONTINUED | OUTPATIENT
Start: 2019-01-30 | End: 2019-02-01

## 2019-01-30 RX ORDER — SODIUM CHLORIDE 0.9 % (FLUSH) 0.9 %
5-40 SYRINGE (ML) INJECTION EVERY 8 HOURS
Status: DISCONTINUED | OUTPATIENT
Start: 2019-01-30 | End: 2019-02-01 | Stop reason: HOSPADM

## 2019-01-30 RX ORDER — POLYETHYLENE GLYCOL 3350 17 G/17G
17 POWDER, FOR SOLUTION ORAL 2 TIMES DAILY
Status: DISCONTINUED | OUTPATIENT
Start: 2019-01-30 | End: 2019-02-01

## 2019-01-30 RX ORDER — FINASTERIDE 5 MG/1
5 TABLET, FILM COATED ORAL DAILY
Status: DISCONTINUED | OUTPATIENT
Start: 2019-01-31 | End: 2019-02-01 | Stop reason: HOSPADM

## 2019-01-30 RX ORDER — ALLOPURINOL 300 MG/1
300 TABLET ORAL DAILY
Status: DISCONTINUED | OUTPATIENT
Start: 2019-01-31 | End: 2019-02-01 | Stop reason: HOSPADM

## 2019-01-30 RX ORDER — FENTANYL CITRATE 50 UG/ML
100 INJECTION, SOLUTION INTRAMUSCULAR; INTRAVENOUS
Status: DISCONTINUED | OUTPATIENT
Start: 2019-01-30 | End: 2019-01-30

## 2019-01-30 RX ORDER — TERAZOSIN 5 MG/1
10 CAPSULE ORAL EVERY EVENING
Status: DISCONTINUED | OUTPATIENT
Start: 2019-01-30 | End: 2019-02-01 | Stop reason: HOSPADM

## 2019-01-30 RX ORDER — ENOXAPARIN SODIUM 100 MG/ML
40 INJECTION SUBCUTANEOUS EVERY 24 HOURS
Status: DISCONTINUED | OUTPATIENT
Start: 2019-01-30 | End: 2019-01-30

## 2019-01-30 RX ORDER — HEPARIN SODIUM 5000 [USP'U]/ML
5000 INJECTION, SOLUTION INTRAVENOUS; SUBCUTANEOUS EVERY 12 HOURS
Status: DISCONTINUED | OUTPATIENT
Start: 2019-01-30 | End: 2019-02-01 | Stop reason: HOSPADM

## 2019-01-30 RX ORDER — SODIUM CHLORIDE 0.9 % (FLUSH) 0.9 %
5-40 SYRINGE (ML) INJECTION AS NEEDED
Status: DISCONTINUED | OUTPATIENT
Start: 2019-01-30 | End: 2019-02-01 | Stop reason: HOSPADM

## 2019-01-30 RX ORDER — ATENOLOL 25 MG/1
25 TABLET ORAL EVERY 12 HOURS
Status: DISCONTINUED | OUTPATIENT
Start: 2019-01-30 | End: 2019-02-01 | Stop reason: HOSPADM

## 2019-01-30 RX ORDER — ESCITALOPRAM OXALATE 10 MG/1
10 TABLET ORAL DAILY
Status: DISCONTINUED | OUTPATIENT
Start: 2019-01-31 | End: 2019-02-01 | Stop reason: HOSPADM

## 2019-01-30 RX ORDER — FUROSEMIDE 40 MG/1
80 TABLET ORAL DAILY
Status: DISCONTINUED | OUTPATIENT
Start: 2019-01-31 | End: 2019-02-01

## 2019-01-30 RX ORDER — HEPARIN SODIUM 200 [USP'U]/100ML
400 INJECTION, SOLUTION INTRAVENOUS ONCE
Status: DISPENSED | OUTPATIENT
Start: 2019-01-30 | End: 2019-01-31

## 2019-01-30 RX ORDER — LIDOCAINE HYDROCHLORIDE 20 MG/ML
20 INJECTION, SOLUTION INFILTRATION; PERINEURAL ONCE
Status: COMPLETED | OUTPATIENT
Start: 2019-01-30 | End: 2019-01-30

## 2019-01-30 RX ORDER — MORPHINE SULFATE 15 MG/1
30 TABLET ORAL
Status: DISCONTINUED | OUTPATIENT
Start: 2019-01-30 | End: 2019-02-01 | Stop reason: HOSPADM

## 2019-01-30 RX ORDER — ONDANSETRON 4 MG/1
4 TABLET, ORALLY DISINTEGRATING ORAL
Status: DISCONTINUED | OUTPATIENT
Start: 2019-01-30 | End: 2019-02-01 | Stop reason: HOSPADM

## 2019-01-30 RX ORDER — SODIUM CHLORIDE 9 MG/ML
25 INJECTION, SOLUTION INTRAVENOUS CONTINUOUS
Status: DISCONTINUED | OUTPATIENT
Start: 2019-01-30 | End: 2019-01-30

## 2019-01-30 RX ADMIN — LIDOCAINE HYDROCHLORIDE 10 MG: 20 INJECTION, SOLUTION INFILTRATION; PERINEURAL at 16:26

## 2019-01-30 RX ADMIN — ONDANSETRON 4 MG: 4 TABLET, ORALLY DISINTEGRATING ORAL at 20:46

## 2019-01-30 RX ADMIN — ALPRAZOLAM 1 MG: 0.5 TABLET ORAL at 20:46

## 2019-01-30 RX ADMIN — SODIUM CHLORIDE 25 ML/HR: 900 INJECTION, SOLUTION INTRAVENOUS at 16:05

## 2019-01-30 RX ADMIN — FENTANYL CITRATE 25 MCG: 50 INJECTION, SOLUTION INTRAMUSCULAR; INTRAVENOUS at 16:17

## 2019-01-30 RX ADMIN — MORPHINE SULFATE 30 MG: 15 TABLET ORAL at 20:46

## 2019-01-30 RX ADMIN — Medication 10 ML: at 22:19

## 2019-01-30 NOTE — PROGRESS NOTES
Called Dr. Gabe Laughlin office and spoke with Tila Vera, Nurse for Dr. Jose Dominguez, and inquired about Dr. Jose Dominguez calling the pt. And Fidelia back regarding plan of care. Ky Brasher phone number to call [facility] directly ([phone]) so Dr. Jose Dominguez could speak with Both the patient and his daughter on speakerphone. Tila Vera states she will give to Dr. Jose Dominguez.

## 2019-01-30 NOTE — ROUTINE PROCESS
TRANSFER - OUT REPORT:    Verbal/bedside report given to Imelda Pacheco RN(name) on Lidia Griffin  being transferred to Oncology, room 1142,(unit) for chest tube placement. Report consisted of patients Situation, Background, Assessment and   Recommendations(SBAR). Information from the following report(s) Procedure Summary was reviewed with the receiving nurse. Lines:   Peripheral IV 01/30/19 Posterior;Right Hand (Active)   Site Assessment Clean, dry, & intact 1/30/2019  4:13 PM   Phlebitis Assessment 0 1/30/2019  4:13 PM   Infiltration Assessment 0 1/30/2019  4:13 PM   Dressing Status Clean, dry, & intact; Occlusive 1/30/2019  4:13 PM   Dressing Type Transparent 1/30/2019  4:13 PM   Hub Color/Line Status Blue; Infusing 1/30/2019  4:13 PM        Opportunity for questions and clarification was provided. Patient transported with:   Registered Nurse        Name of procedure:  Chest tube placement      Complications, if any, r/t procedure:  None noted       medications given: Fentanyl 25 mcg during procedure      Medication tolerated: without difficulty      VS : Stable       Post Procedure Care Needed/order sets in [facility]:  See [facility] for orders      Any other specific needs to pt. Care:  Please call ext [phone] for any further questions/concerns regarding pt.'s procedure today.

## 2019-01-30 NOTE — PROGRESS NOTES
Dr. Alva Diop aware pt. Is ready for his procedure. Dr. Alva Diop stating he needs to call Dr. Bonita Velez prior to procedure.

## 2019-01-30 NOTE — PROGRESS NOTES
Received pt. On xray recovery stretcher accomp. By nurse. Pt awake, alert and oriented x 3. Pt states no c/o pain. Pt c/o shortness of breath and was seen by family doctor tomorrow.

## 2019-01-30 NOTE — PROGRESS NOTES
Dr. Sam Cameron in to speak with pt. About procedure and Dr. Isaiah Simms suggestion of ? Admission. Dr. Sam Cameron advised pt.'s daughter, Elysia Carreon, to call Dr. Shannan Akhtar and consult with him regarding plan of care. Phone number for Dr. Shannan Akhtar is [phone] per Dr. Sam Cameron.

## 2019-01-30 NOTE — PROGRESS NOTES
Spoke to Nae at Dr. Sherrie Antonio office to inform if Dr. Margret Hood could place order for chest tube placement and admission orders for pt.

## 2019-01-31 ENCOUNTER — APPOINTMENT (OUTPATIENT)
Dept: GENERAL RADIOLOGY | Age: 84
DRG: 186 | End: 2019-01-31
Attending: FAMILY MEDICINE
Payer: MEDICARE

## 2019-01-31 ENCOUNTER — APPOINTMENT (OUTPATIENT)
Dept: INTERVENTIONAL RADIOLOGY/VASCULAR | Age: 84
DRG: 186 | End: 2019-01-31
Attending: FAMILY MEDICINE
Payer: MEDICARE

## 2019-01-31 LAB
ALBUMIN SERPL-MCNC: 2.5 G/DL (ref 3.5–5)
ALBUMIN/GLOB SERPL: 0.8 {RATIO} (ref 1.1–2.2)
ALP SERPL-CCNC: 211 U/L (ref 45–117)
ALT SERPL-CCNC: 12 U/L (ref 12–78)
ANION GAP SERPL CALC-SCNC: 7 MMOL/L (ref 5–15)
AST SERPL-CCNC: 20 U/L (ref 15–37)
BASOPHILS # BLD: 0 K/UL (ref 0–0.1)
BASOPHILS NFR BLD: 0 % (ref 0–1)
BILIRUB SERPL-MCNC: 0.6 MG/DL (ref 0.2–1)
BUN SERPL-MCNC: 28 MG/DL (ref 6–20)
BUN/CREAT SERPL: 38 (ref 12–20)
CALCIUM SERPL-MCNC: 6.9 MG/DL (ref 8.5–10.1)
CHLORIDE SERPL-SCNC: 105 MMOL/L (ref 97–108)
CO2 SERPL-SCNC: 24 MMOL/L (ref 21–32)
CREAT SERPL-MCNC: 0.74 MG/DL (ref 0.7–1.3)
DIFFERENTIAL METHOD BLD: ABNORMAL
EOSINOPHIL # BLD: 0 K/UL (ref 0–0.4)
EOSINOPHIL NFR BLD: 0 % (ref 0–7)
ERYTHROCYTE [DISTWIDTH] IN BLOOD BY AUTOMATED COUNT: 19.3 % (ref 11.5–14.5)
GLOBULIN SER CALC-MCNC: 3.2 G/DL (ref 2–4)
GLUCOSE SERPL-MCNC: 128 MG/DL (ref 65–100)
HCT VFR BLD AUTO: 34.9 % (ref 36.6–50.3)
HGB BLD-MCNC: 11 G/DL (ref 12.1–17)
IMM GRANULOCYTES # BLD AUTO: 0 K/UL (ref 0–0.04)
IMM GRANULOCYTES NFR BLD AUTO: 0 % (ref 0–0.5)
LYMPHOCYTES # BLD: 0.4 K/UL (ref 0.8–3.5)
LYMPHOCYTES NFR BLD: 6 % (ref 12–49)
MCH RBC QN AUTO: 24.4 PG (ref 26–34)
MCHC RBC AUTO-ENTMCNC: 31.5 G/DL (ref 30–36.5)
MCV RBC AUTO: 77.6 FL (ref 80–99)
MONOCYTES # BLD: 0.4 K/UL (ref 0–1)
MONOCYTES NFR BLD: 6 % (ref 5–13)
NEUTS SEG # BLD: 6.8 K/UL (ref 1.8–8)
NEUTS SEG NFR BLD: 88 % (ref 32–75)
NRBC # BLD: 0 K/UL (ref 0–0.01)
NRBC BLD-RTO: 0 PER 100 WBC
PLATELET # BLD AUTO: 126 K/UL (ref 150–400)
POTASSIUM SERPL-SCNC: 4.4 MMOL/L (ref 3.5–5.1)
PROT SERPL-MCNC: 5.7 G/DL (ref 6.4–8.2)
RBC # BLD AUTO: 4.5 M/UL (ref 4.1–5.7)
SODIUM SERPL-SCNC: 136 MMOL/L (ref 136–145)
WBC # BLD AUTO: 7.8 K/UL (ref 4.1–11.1)

## 2019-01-31 PROCEDURE — 74011250637 HC RX REV CODE- 250/637: Performed by: FAMILY MEDICINE

## 2019-01-31 PROCEDURE — 65270000015 HC RM PRIVATE ONCOLOGY

## 2019-01-31 PROCEDURE — 85025 COMPLETE CBC W/AUTO DIFF WBC: CPT

## 2019-01-31 PROCEDURE — 80053 COMPREHEN METABOLIC PANEL: CPT

## 2019-01-31 PROCEDURE — 36415 COLL VENOUS BLD VENIPUNCTURE: CPT

## 2019-01-31 PROCEDURE — 32999 UNLISTED PX LUNGS & PLEURA: CPT

## 2019-01-31 PROCEDURE — 74019 RADEX ABDOMEN 2 VIEWS: CPT

## 2019-01-31 PROCEDURE — 71045 X-RAY EXAM CHEST 1 VIEW: CPT

## 2019-01-31 RX ORDER — BISACODYL 5 MG
10 TABLET, DELAYED RELEASE (ENTERIC COATED) ORAL
Status: ACTIVE | OUTPATIENT
Start: 2019-01-31 | End: 2019-01-31

## 2019-01-31 RX ORDER — PANTOPRAZOLE SODIUM 40 MG/1
40 GRANULE, DELAYED RELEASE ORAL
Qty: 30 EACH | Refills: 1 | Status: SHIPPED | OUTPATIENT
Start: 2019-02-01

## 2019-01-31 RX ORDER — POLYETHYLENE GLYCOL 3350 17 G/17G
17 POWDER, FOR SOLUTION ORAL DAILY
Status: DISCONTINUED | OUTPATIENT
Start: 2019-01-31 | End: 2019-02-01 | Stop reason: HOSPADM

## 2019-01-31 RX ORDER — PANTOPRAZOLE SODIUM 40 MG/1
40 GRANULE, DELAYED RELEASE ORAL
Status: DISCONTINUED | OUTPATIENT
Start: 2019-02-01 | End: 2019-02-01 | Stop reason: HOSPADM

## 2019-01-31 RX ADMIN — ONDANSETRON 4 MG: 4 TABLET, ORALLY DISINTEGRATING ORAL at 07:35

## 2019-01-31 RX ADMIN — Medication 10 ML: at 17:13

## 2019-01-31 RX ADMIN — MORPHINE SULFATE 15 MG: 15 TABLET ORAL at 09:46

## 2019-01-31 RX ADMIN — ALPRAZOLAM 0.25 MG: 0.5 TABLET ORAL at 09:51

## 2019-01-31 RX ADMIN — MORPHINE SULFATE 30 MG: 15 TABLET ORAL at 16:13

## 2019-01-31 RX ADMIN — MORPHINE SULFATE 15 MG: 15 TABLET ORAL at 12:04

## 2019-01-31 NOTE — PROGRESS NOTES
Oncology End of Shift Note      Bedside shift change report given to Magdalene Gomez RN (incoming nurse) by Tessie Blair (outgoing nurse) on [facility]. Report included the following information SBAR, Kardex, Intake/Output, MAR and Recent Results. Shift Summary:       Issues for Physician to Address:       Patient on Cardiac Monitoring?     [] Yes  [x] No    Rhythm:          Shift Events        Demarco Orozco

## 2019-01-31 NOTE — PROGRESS NOTES
Initial Nutrition Assessment:    INTERVENTIONS/RECOMMENDATIONS:   · Consider SLP consult  · Ensure BID (strawberry)  · Consider MVI    ASSESSMENT:   Chart reviewed, medically noted for large right upper lobe lung cancer, chest tube drainage of a hydropneumothorax, new onset dysphagia and PMH shown below. Nutrition consulted for general nutrition management. Pt has experienced severe weight loss of 24 lbs (16% of BW) in the past 4 months. Pt attribute this to extremely poor appetite and altered taste. He was yashira to consume one Boost shake per day. Difficult swallowing started about 2 weeks ago. His physical appearance is cachetic with noticeable temporal and clavicle muscle wasting as well as orbital and thoracic fat wasting. Pt meets ASPEN criteria of acute severe malnutrition. Past Medical History:   Diagnosis Date    BP (high blood pressure) 4/30/2010    Cancer (HCC)     Lung    Diabetes mellitus, type 2 (HCC)     Elevated PSA 4/30/2010    Elevated PSA     has had 2 biopsies, both were negative for cancer    Gout 4/30/2010       Diet Order: Regular  % Eaten:  No data found.   Pertinent Medications: [x]Reviewed: lasix, zofran, miralax,   Pertinent Labs: [x]Reviewed:   Food Allergies: [x]NKFA  []Other   Last BM: unkown  Edema:        []RUE   []LUE   [x]RLE 1+  [x]LLE 1+     Pressure Injury:      [] Stage I   [] Stage II   [] Stage III   [] Stage IV      Wt Readings from Last 30 Encounters:   01/30/19 56.2 kg (124 lb)   01/29/19 55.1 kg (121 lb 6.4 oz)   01/22/19 56.7 kg (125 lb)   01/15/19 56.7 kg (125 lb)   01/11/19 56.8 kg (125 lb 3.2 oz)   12/18/18 60.3 kg (133 lb)   12/18/18 60.4 kg (133 lb 3.2 oz)   12/16/18 60.5 kg (133 lb 6.1 oz)   12/04/18 61.3 kg (135 lb 3.2 oz)   11/26/18 61.9 kg (136 lb 6.4 oz)   11/08/18 65.8 kg (145 lb)   10/22/18 65.8 kg (145 lb)   10/16/18 68 kg (150 lb)   10/04/18 67.4 kg (148 lb 9.6 oz)   09/27/18 67.6 kg (149 lb)   09/11/18 68 kg (150 lb)   08/31/18 69.4 kg (153 lb) 08/28/18 69.4 kg (153 lb)   08/22/18 69.3 kg (152 lb 12.5 oz)   08/20/18 69.3 kg (152 lb 12.8 oz)   08/08/18 69.9 kg (154 lb)   07/24/18 69.4 kg (153 lb)   07/20/18 71.2 kg (157 lb)   07/09/18 71.3 kg (157 lb 3.2 oz)   06/29/18 71.7 kg (158 lb)   06/26/18 72.1 kg (159 lb)   06/04/18 72.2 kg (159 lb 3.2 oz)   05/14/18 72.2 kg (159 lb 3.2 oz)   04/12/18 72.6 kg (160 lb)   03/27/18 73.3 kg (161 lb 9.6 oz)       Anthropometrics:   Height: 5' 7\" (170.2 cm) Weight: 56.2 kg (124 lb)   IBW (%IBW):   ( ) UBW (%UBW):   (  %)   Last Weight Metrics:  Weight Loss Metrics 1/30/2019 1/29/2019 1/22/2019 1/15/2019 1/11/2019 12/18/2018 12/18/2018   Today's Wt 124 lb 121 lb 6.4 oz 125 lb 125 lb 125 lb 3.2 oz 133 lb 133 lb 3.2 oz   BMI 19.42 kg/m2 19.01 kg/m2 19.58 kg/m2 19.58 kg/m2 19.61 kg/m2 20.83 kg/m2 20.86 kg/m2       BMI: Body mass index is 19.42 kg/m². This BMI is indicative of:   []Underweight    [x]Normal    []Overweight    [] Obesity   [] Extreme Obesity (BMI>40)     Estimated Nutrition Needs (Based on):   1800 Kcals/day(BMR: 1200 x 1.3 +250) , 70 g(1.3 g/kg) Protein  Carbohydrate: At Least 130 g/day  Fluids: 1800 mL/day (1ml/kcal) or per primary team    NUTRITION DIAGNOSES:   Problem:  Inadequate protein-energy intake      Etiology: related to poor appetite and altered taste     Signs/Symptoms: as evidenced by 16% wt loss x 4 months      NUTRITION INTERVENTIONS:  Meals/Snacks: General/healthful diet   Supplements: Commercial supplement              GOAL:   consume >50% of meals and ONS in 2-4 days    LEARNING NEEDS (Diet, Food/Nutrient-Drug Interaction):    [x] None Identified   [] Identified and Education Provided/Documented   [] Identified and Pt declined/was not appropriate     [place], [place], [place] Ethnic Dietary Needs:    [x] None Identified   [] Identified and Addressed     [x] Interdisciplinary Care Plan Reviewed/Documented    [x] Discharge Planning:   Small frequent meals with Kcal and protein dense foods. Nutrition supplements as able, ideally 3+ x per day    MONITORING /EVALUATION:      Food/Nutrient Intake Outcomes:  Total energy intake  Physical Signs/Symptoms Outcomes: Weight/weight change, Electrolyte and renal profile, GI    NUTRITION RISK:    [x] High              [] Moderate           []  Low  []  Minimal/Uncompromised    PT SEEN FOR:    [x]  MD Consult: []Calorie Count      []Diabetic Diet Education        []Diet Education     []Electrolyte Management     [x]General Nutrition Management and Supplements     []Management of Tube Feeding     []TPN Recommendations    []  RN Referral:  []MST score >=2     []Enteral/Parenteral Nutrition PTA     []Pregnant: Gestational DM or Multigestation     []Pressure Ulcer/Wound Care needs        []  Low BMI  []  LOS Referral       Merna Escalante RDN  Pager [phone]  Weekend Pager [phone]

## 2019-01-31 NOTE — PROGRESS NOTES
Lovenox 40 mg sc daily ordered for dvt ppx  Will adjust to heparin 5000 units sc every 12 hr for body weight < 60 kg    Mumtaz Courtney, [address]

## 2019-01-31 NOTE — H&P
[facility] HISTORY AND PHYSICAL Raffy Witt 
MR#: [id number] : 1933 ACCOUNT #: [de-identified] ADMIT DATE: 2019 HISTORY OF PRESENT ILLNESS:  The patient is a pleasant but unfortunate 27-year-old white male with a history of a large right upper lobe lung cancer who was admitted for chest tube drainage of a hydropneumothorax. Patient was diagnosed with an adenocarcinoma of the right lung in 2018. At that time was noted to have a large right upper lung mass with collapse of the right upper lobe. He had a large right pleural effusion at the time. He also had right hilar and mediastinal adenopathy. He has been followed by Dr. Onesimo Redmond of oncology and currently has been treated with p.o. Lisa Mater for this. He has been getting frequent thoracentesis due to recurrent right pleural effusions causing severe dyspnea. He was seen in the office yesterday and noted increasing shortness of breath. Patient also noted that for the last several weeks, he has been having difficulty swallowing, spitting up lots of foam.  He also was having difficulty burping. It was advised for him to be admitted to the hospital yesterday, as he looked quite short of breath; however, he did not want to go. He did agree for repeat thoracentesis and we were going to also try to schedule an upper GI/barium swallow. When seen today, an ultrasound as well as the hydrothorax was also noted to have a large pneumothorax also. A chest tube was placed by Interventional Radiology and the patient was admitted for further evaluation. Currently, the patient, while lying in bed, is fairly comfortable, is not complaining of any chest pain and actually his breathing looks much better than yesterday. He has received both morphine for pain and Xanax for anxiety. His daughter is with him tonight.  
 
PAST MEDICAL HISTORY:  Significant for hypertension, history of diabetes, history of elevated PSA. He has had 2 negative biopsies. History of gout. PAST SURGICAL HISTORY:  Includes multiple recurrent thoracentesis for his right pleural effusion. He has had colonoscopy. He has had a hernia repaired. SMOKING:  The patient quit in 1965. ALCOHOL:  Use is negative. ALLERGIES:  CIPRO, ERYTHROMYCIN, AND PENICILLIN. CURRENT MEDICATIONS:  Alprazolam 1 mg b.i.d., allopurinol 300 mg daily, albuterol inhaler p.r.n., atenolol 50 mg a half tab b.i.d., Cozaar 25 mg daily, Zyrtec 10 mg daily, finasteride 5 mg daily, hydrochlorothiazide 25 mg daily, pravastatin 20 mg daily, terazosin 10 mg daily, and aspirin 325 mg daily. SOCIAL HISTORY:  The patient is retired. He has 2 children from an earlier marriage. He has been having some major marital problems with his second wife and basically she left him approximately a month ago. His daughter has moved up temporarily from [place] and has been very active in caring for him. Her name is Vandana. REVIEW OF SYSTEMS: 
CARDIOVASCULAR:  No past history of coronary disease. He has been complaining of some lower chest pain which is fairly constant and is felt to be related to his lung cancer. RESPIRATORY:  As above. GASTROINTESTINAL:  Appetite has been poor. He has been having dysphagia as noted. GENITOURINARY:  He takes finasteride and terazosin for BPH. GENERAL:  He has been feeling weak. He has been losing weight. EAR, NOSE, AND THROAT:  History of allergic rhinitis, for which he takes Claritin. NEUROLOGIC:  He has been having some progressive difficulty walking. MUSCULOSKELETAL:  There have been no complaints of back pain or headaches. Remainder of review of systems negative. PHYSICAL EXAMINATION: 
GENERAL:  Patient is alert, oriented, pleasant, cooperative, ill-appearing, mildly tachypneic 26-year-old white male.  
VITAL SIGNS:  Temperature 97.9, pulse 94, blood pressure 112/64, O2 sat 97% on room air. GENERAL:  Patient is lying in bed, appears comfortable, has a chest tube in place draining to gravity. LUNGS:  Absent breath sounds on the right side. CARDIOVASCULAR:  Regular rhythm and rate. No murmurs, thrills, rubs, or gallops. ABDOMEN:  Soft and nontender. EXTREMITIES:  2+ edema bilaterally. This has been developing over the last few months. NEUROLOGIC:  Nonfocal.   
 
LABORATORY DATA:  BUN 24, creatinine 0.79, alkaline phosphatase 222. CBC is pending. ASSESSMENT: 
1.  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 Hannah Porter. 2.  Recurrent right pleural effusion. 3.  Recurrent right hydropneumothorax with chest tube in place. 4.  Recently developed edema, currently taking Lasix for this. 5.  History of elevated prostate-specific antigen with negative biopsies x2. 
6.  History of hypertension. 7.  History of anxiety. Continue Xanax for now. 8.  Recent onset of dysphagia and vomiting. PLAN:  Continue chest tube drainage for now. We will try to get GI workup of dysphagia once the patient is able to get his chest tube out and able to move better. Mattie Driver MD 
 
  
MS/LN 
D: 01/30/2019 19:07    
T: 01/30/2019 19:39 
JOB #: [id number]

## 2019-01-31 NOTE — PROGRESS NOTES
2040: Pt called out. Nurse went to room pt found having extreme abdominal cramping/pain. Nurse gave pt scheduled Xanax and PRN Morphine and Zofran. Nursing paged on call physician to discuss pt condition. Pt has chest tube in place that has put out at least 750 in an hour and a half. 2120Dinadine Nevarez with on-call physician for Dr. Hesham Alvarez office. Dr. Claude Dubois advised nurse to contact on-call physician for IR.      2200: Chest tube canister system changed. 1650 in old canister and noted in chart. Spoke with Dr. Bud Shelley. After reviewing chart and images, he is not concerned with the chest tube and output. 2300: Nurse into to check on pt. Pt back in bed resting quietly. Will continue to monitor pt and chest tube output.

## 2019-01-31 NOTE — PROGRESS NOTES
1405 Scanned pt bladder per physician request. 378 mL in pt bladder. [address] pt bladder after attempting to urinate. 525 mL in pt bladder. MD notified of bladder scan results. MD currently speaking to pt.

## 2019-01-31 NOTE — WOUND CARE
Wound Care consult: Chart reviewed and patient assessed for his sacrum wound that was present on admission. Pt. Chronically uses a \"donut\" pillow at home and this is actually causing venous congestion around the wound which caused the darkly discolored skin on the sarum and buttocks. The wound is a friction wound (skin tear) and the wound bed blanches well. This is not a pressure injury. It is not necrotic. The wound was clean. Sensicare was applied to the wound and covered with a Mepilex foam dressing. ES Desitin ordered and patient's daughter was instructed on how to care for this at home for him. Instructed her to greatly limit the amount of time he sits on the \"donut pillow\" or get rid of it completely.    Orly Santizo RN, BSN, [employer]

## 2019-01-31 NOTE — PROGRESS NOTES
Oncology End of Shift Note      Bedside shift change report given to [address], RN (incoming nurse) by Evelyn Lau (outgoing nurse) on [facility]. Report included the following information SBAR, Kardex, Intake/Output and MAR. Shift Summary: Pt had chest tube placed 1/30. Pt experience major abdominal pain and cramping at beginning of shift. PRN Zofran and Morphine given. Chest tube put out over 1000mL in first 2 hours of shift. Drainage has no slowed down. VS remained stable. Labs obtained. Issues for Physician to Address: Output from chest tube. Abdominal pain and cramping. Patient on Cardiac Monitoring? [] Yes  [x] No    Rhythm:          Shift Events  Contacted On-Call IR doctor about chest tube concern. No interventions needed. Changed canister for chest tube collection.         Evelyn Lau

## 2019-01-31 NOTE — CONSULTS
GI Consultation Note Jose Carlosny Doctor)    NAME: Melisa Romero : 1933 MRN: [id number]   ATTG: LOUANN Abernathy MD PCP: Lara Quevedo MD  Date/Time:  2019 9:15 AM  Subjective:   REASON FOR CONSULT:      Deisi Carrizales is a 80 y.o.  adult who I was asked to see for dysphagia and abdominal pain. Medical history includes large right adenocarcinoma in right lung (dx 2018) currently receiving tx, DM, and HTN. He has been have recurrent pleural effusion with drainage and currently has a chest tube. He reports having trouble swallowing saliva, thin liquids, pills and solid foods. States food will pass throat but then get stuck \"going down the rest of the way. \" He will frequently have \"foam\" come up into throat. He reports decreased appetite as things taste bad from all the medicine he is taking. Reports weight loss of \"40lbs in 40 days. \" Last night he had diffuse sharp abdominal pain that has resolved this AM. Denies any nausea, vomiting, diarrhea around this episode. He has some constipation as well. Denies any previous EGD or colonoscopy. No previous evaluation for dysphagia. He appears comfortable sitting on side of bed but gets short of breath when speaking. Daughter at bedside.        Past Medical History:   Diagnosis Date    BP (high blood pressure) 2010    Cancer (HCC)     Lung    Diabetes mellitus, type 2 (HCC)     Elevated PSA 2010    Elevated PSA     has had 2 biopsies, both were negative for cancer    Gout 2010      Past Surgical History:   Procedure Laterality Date    CHEST SURGERY PROCEDURE UNLISTED      Previous thoracenteses    HX COLONOSCOPY      HX HERNIA REPAIR      IR THORACENTESIS/INSERT CHEST TUBE  2019     Social History     Tobacco Use    Smoking status: Former Smoker     Last attempt to quit: 1965     Years since quittin.1    Smokeless tobacco: Never Used    Tobacco comment:    Substance Use Topics    Alcohol use: No      Family History Problem Relation Age of Onset    Diabetes Brother       Allergies   Allergen Reactions    Ciprofloxacin Other (comments)     Nauseated, felt fuzzy    Erythromycin Unknown (comments)    Pcn [Penicillins] Swelling      Home Medications:  Prior to Admission Medications   Prescriptions Last Dose Informant Patient Reported? Taking? ALPRAZolam (XANAX) 1 mg tablet 2019 at Unknown time  No Yes   Sig: TAKE ONE TABLET BY MOUTH TWICE DAILY AS NEEDED FOR ANXIETY   Cholecalciferol, Vitamin D3, (VITAMIN D3) 1,000 unit Cap Not Taking at Unknown time  Yes No   Sig: Take 1 Tab by mouth daily. MULTIVITAMINS (MULTIPLE VITAMINS PO) Not Taking at Unknown time  Yes No   Sig: Take 1 Tab by mouth daily. VIT C/E/ZN/COPPR/LUTEIN/ZEAXAN (PRESERVISION AREDS 2 PO) Not Taking at Unknown time  Yes No   Sig: Take  by mouth daily. allopurinol (ZYLOPRIM) 300 mg tablet Not Taking at Unknown time  No No   Sig: TAKE ONE TABLET BY MOUTH ONCE DAILY TO PREVENT GOUT   atenolol (TENORMIN) 50 mg tablet Not Taking at Unknown time  No No   Sig: TAKE 1/2  TABLET BY MOUTH TWICE DAILY FOR BLOOD PRESSURE   cetirizine (ZYRTEC) 10 mg tablet Not Taking at Unknown time  No No   Sig: Take 1 Tab by mouth daily. escitalopram oxalate (LEXAPRO) 10 mg tablet Not Taking at Unknown time  No No   Sig: Take 1 Tab by mouth daily. finasteride (PROSCAR) 5 mg tablet Not Taking at Unknown time  No No   Sig: Take 1 Tab by mouth daily. For prostate   furosemide (LASIX) 40 mg tablet Not Taking at Unknown time  No No   Si-3 tabs po daily for fluid   gabapentin (NEURONTIN) 300 mg capsule 2019 at Unknown time  No Yes   Sig: One tab po qhs- 1st week. Then 1 tab po bid- 2nd week. Then 1 tab po tid. For pain in feet   megestrol (MEGACE) 20 mg tablet Not Taking at Unknown time  No No   Sig: Take 1 Tab by mouth daily.  For appetite   morphine IR (MS IR) 15 mg tablet 2019 at Unknown time  No Yes   Si tabs po every 4-6 hours as needed for pain ondansetron (ZOFRAN ODT) 4 mg disintegrating tablet Not Taking at Unknown time  No No   Sig: Take 1 Tab by mouth every eight (8) hours as needed for Nausea. piroxicam (FELDENE) 20 mg capsule Not Taking at Unknown time  No No   Sig: Take 1 Cap by mouth daily. For pain   polyethylene glycol (MIRALAX) 17 gram/dose powder Not Taking at Unknown time  No No   Sig: Take 17 g by mouth two (2) times a day.    pravastatin (PRAVACHOL) 20 mg tablet Not Taking at Unknown time  No No   Sig: TAKE ONE TABLET BY MOUTH ONCE DAILY AT  NIGHT  FOR  CHOLESTEROL   terazosin (HYTRIN) 10 mg capsule Not Taking at Unknown time  No No   Sig: TAKE ONE CAPSULE BY MOUTH ONCE DAILY FOR BLOOD PRESSURE AND PROSTATE      Facility-Administered Medications: None     Hospital medications:  Current Facility-Administered Medications   Medication Dose Route Frequency    allopurinol (ZYLOPRIM) tablet 300 mg  300 mg Oral DAILY    ALPRAZolam (XANAX) tablet 1 mg  1 mg Oral BID    atenolol (TENORMIN) tablet 25 mg  25 mg Oral Q12H    escitalopram oxalate (LEXAPRO) tablet 10 mg  10 mg Oral DAILY    finasteride (PROSCAR) tablet 5 mg  5 mg Oral DAILY    furosemide (LASIX) tablet 80 mg  80 mg Oral DAILY    morphine IR (MS IR) tablet 30 mg  30 mg Oral Q4H PRN    ondansetron (ZOFRAN ODT) tablet 4 mg  4 mg Oral Q8H PRN    polyethylene glycol (MIRALAX) packet 17 g  17 g Oral BID    terazosin (HYTRIN) capsule 10 mg  10 mg Oral QPM    sodium chloride (NS) flush 5-40 mL  5-40 mL IntraVENous Q8H    sodium chloride (NS) flush 5-40 mL  5-40 mL IntraVENous PRN    heparin (porcine) injection 5,000 Units  5,000 Units SubCUTAneous Q12H     REVIEW OF SYSTEMS:     []     Unable to obtain  ROS due to  []    mental status change  []    sedated   []    intubated   [x]    Total of 11 systems reviewed as follows:  Const:   negative fever, negative chills,   Eyes:   negative diplopia or visual changes, negative eye pain  ENT:   negative coryza, negative sore throat  Resp: negative cough, hemoptysis  Cards:  negative for chest pain, palpitations  :  negative for frequency, dysuria and hematuria  Skin:   negative for rash and pruritus  Heme:   negative for easy bruising and gum/nose bleeding  MS:  negative for myalgias, arthralgias, back pain  Neurolo:  negative for headaches, dizziness, vertigo, memory problems   Psych:  negative for feelings of anxiety, depression     Pertinent Positives include : 40lb weight loss, BLE edema, muscle weakness, shortness of breath    Objective:   VITALS:    Visit Vitals  /72 (BP 1 Location: Right arm, BP Patient Position: At rest)   Pulse 88   Temp 97.6 °F (36.4 °C)   Resp 18   Ht 5' 7\" (1.702 m)   Wt 56.2 kg (124 lb)   SpO2 98%   BMI 19.42 kg/m²     Temp (24hrs), Av.8 °F (36.6 °C), Min:97.5 °F (36.4 °C), Max:98 °F (36.7 °C)    PHYSICAL EXAM:   General:    Alert, cooperative, mild distress with motility, comfortable at rest, appears stated age. Head:   Normocephalic, without obvious abnormality, atraumatic. Eyes:   Conjunctivae clear, anicteric sclerae. Pupils are equal  Nose:  Nares normal.   Throat:    Lips, mucosa, and tongue normal.    Neck:  Kyphotic, symmetrical,  no adenopathy  Back:    Symmetric,  No CVA tenderness. Lungs:   CTA bilaterally. No wheezing/rhonchi/rales. Chest wall:  No tenderness or deformity. No Accessory muscle use. Heart:   Regular rate and rhythm,  no murmur, rub or gallop. Abdomen:   Soft, tender to RLQ and LLQ. Flat. Not distended. Bowel sounds normal. No palpable masses  Extremities: Atraumatic, No cyanosis. No edema. No clubbing  Skin:     Texture, turgor normal. No rashes/lesions/jaundice  Lymph: Cervical, supraclavicular normal.  Psych:  Okay insight. Not depressed. Not anxious or agitated. Neurologic: EOMs intact. No facial asymmetry. No aphasia or slurred speech. Normal  strength, A/O X 3.      LAB DATA REVIEWED:    Recent Results (from the past 48 hour(s))   AMB POC URINALYSIS DIP STICK AUTO W/ MICRO    Collection Time: 01/29/19  2:14 PM   Result Value Ref Range    Color (UA POC)      Clarity (UA POC) Clear     Glucose (UA POC) Negative Negative    Bilirubin (UA POC) 1+ Negative    Ketones (UA POC) 1+ Negative    Specific gravity (UA POC) 1.015 1.001 - 1.035    Blood (UA POC) Negative Negative    pH (UA POC) 5.5 4.6 - 8.0    Protein (UA POC) 1+ Negative    Urobilinogen (UA POC) 1 mg/dL 0.2 - 1    Nitrites (UA POC) Negative Negative    Leukocyte esterase (UA POC) Negative Negative   METABOLIC PANEL, COMPREHENSIVE    Collection Time: 01/29/19  2:14 PM   Result Value Ref Range    Glucose 108 (H) 65 - 99 mg/dL    BUN 24 8 - 27 mg/dL    Creatinine 0.79 0.76 - 1.27 mg/dL    GFR est non-AA 82 >59 mL/min/1.73    GFR est AA 95 >59 mL/min/1.73    BUN/Creatinine ratio 30 (H) 10 - 24    Sodium 140 134 - 144 mmol/L    Potassium 5.0 3.5 - 5.2 mmol/L    Chloride 100 96 - 106 mmol/L    CO2 20 20 - 29 mmol/L    Calcium 7.3 (L) 8.6 - 10.2 mg/dL    Protein, total 6.1 6.0 - 8.5 g/dL    Albumin 3.4 (L) 3.5 - 4.7 g/dL    GLOBULIN, TOTAL 2.7 1.5 - 4.5 g/dL    A-G Ratio 1.3 1.2 - 2.2    Bilirubin, total 0.7 0.0 - 1.2 mg/dL    Alk. phosphatase 222 (H) 39 - 117 IU/L    AST (SGOT) 20 0 - 40 IU/L    ALT (SGPT) 7 0 - 44 IU/L   METABOLIC PANEL, COMPREHENSIVE    Collection Time: 01/31/19  2:50 AM   Result Value Ref Range    Sodium 136 136 - 145 mmol/L    Potassium 4.4 3.5 - 5.1 mmol/L    Chloride 105 97 - 108 mmol/L    CO2 24 21 - 32 mmol/L    Anion gap 7 5 - 15 mmol/L    Glucose 128 (H) 65 - 100 mg/dL    BUN 28 (H) 6 - 20 MG/DL    Creatinine 0.74 0.70 - 1.30 MG/DL    BUN/Creatinine ratio 38 (H) 12 - 20      GFR est AA >60 >60 ml/min/1.73m2    GFR est non-AA >60 >60 ml/min/1.73m2    Calcium 6.9 (L) 8.5 - 10.1 MG/DL    Bilirubin, total 0.6 0.2 - 1.0 MG/DL    ALT (SGPT) 12 12 - 78 U/L    AST (SGOT) 20 15 - 37 U/L    Alk.  phosphatase 211 (H) 45 - 117 U/L    Protein, total 5.7 (L) 6.4 - 8.2 g/dL    Albumin 2.5 (L) 3.5 - 5.0 g/dL Globulin 3.2 2.0 - 4.0 g/dL    A-G Ratio 0.8 (L) 1.1 - 2.2     CBC WITH AUTOMATED DIFF    Collection Time: 01/31/19  2:50 AM   Result Value Ref Range    WBC 7.8 4.1 - 11.1 K/uL    RBC 4.50 4. 10 - 5.70 M/uL    HGB 11.0 (L) 12.1 - 17.0 g/dL    HCT 34.9 (L) 36.6 - 50.3 %    MCV 77.6 (L) 80.0 - 99.0 FL    MCH 24.4 (L) 26.0 - 34.0 PG    MCHC 31.5 30.0 - 36.5 g/dL    RDW 19.3 (H) 11.5 - 14.5 %    PLATELET 284 (L) 134 - 400 K/uL    NRBC 0.0 0  WBC    ABSOLUTE NRBC 0.00 0.00 - 0.01 K/uL    NEUTROPHILS 88 (H) 32 - 75 %    LYMPHOCYTES 6 (L) 12 - 49 %    MONOCYTES 6 5 - 13 %    EOSINOPHILS 0 0 - 7 %    BASOPHILS 0 0 - 1 %    IMMATURE GRANULOCYTES 0 0.0 - 0.5 %    ABS. NEUTROPHILS 6.8 1.8 - 8.0 K/UL    ABS. LYMPHOCYTES 0.4 (L) 0.8 - 3.5 K/UL    ABS. MONOCYTES 0.4 0.0 - 1.0 K/UL    ABS. EOSINOPHILS 0.0 0.0 - 0.4 K/UL    ABS. BASOPHILS 0.0 0.0 - 0.1 K/UL    ABS. IMM. GRANS. 0.0 0.00 - 0.04 K/UL    DF AUTOMATED       IMAGING RESULTS:   [x]      I have personally reviewed the actual   []    CXR  [x]    CT  []     US    INDICATION: c34.11  lung carcinoma  DATE: 12/03/2018     COMPARISON: Chest CT August 22, 2018, abdominal pelvic CT July 19, 2018.     TECHNIQUE:  Following the uneventful intravenous administration of cc  Isovue-300, 5 mm axial images were obtained through the chest. Coronal and  sagittal reconstructions were generated. CT dose reduction was achieved through  use of a standardized protocol tailored for this examination and automatic  exposure control for dose modulation.      FINDINGS:     THYROID: No nodule. MEDIASTINUM: No mass or lymphadenopathy. JUSTINE: No mass or lymphadenopathy. THORACIC AORTA: No dissection or aneurysm. MAIN PULMONARY ARTERY: Normal in caliber. TRACHEA/BRONCHI: Patent. ESOPHAGUS: No wall thickening or dilatation. HEART: Normal in size. PLEURA: Large hydropneumothorax on the right filling the entire check chest  cavity. lymph nodes in the mediastinum and justine have decreased.  Also decreased  size in the axilla, major vessels are patent. There is no left pleural effusion  no pericardial effusion. Collapsed Lung. Underlying infiltration not excluded. LUNGS: No nodule, mass, or airspace disease. INCIDENTALLY IMAGED UPPER ABDOMEN: No focal abnormality. BONES: No destructive bone lesion.     Small low-density lesion in the spleen is unchanged. Liver and spleen are normal  in size. Adrenals are stable. Multiple bilateral renal cysts are unchanged. The  gallbladder is not distended, the pancreas appears unremarkable. Fecal stasis  seen in the rectosigmoid. The prostate remains markedly enlarged with bladder  wall thickening. Left inguinal hernia once again demonstrated does contain some  intestine. There is however no obstruction. No free air or free fluid. Significant progression of bone disease which is diffuse at this time.     IMPRESSION  IMPRESSION: Large right side hydropneumothorax. Significant progression of bone  disease. Recommendations/Plan:      Active Problems:    Chest tube in place (1/30/2019)      Hydropneumothorax (1/30/2019)       Mr. Rachelle Loco is an 81 yo  male with worsening dysphagia over the last few months and an episode of abdominal pain last night. He was diagnosed with adenocarcinoma in right lung in 7/2018. He reports having trouble swallowing saliva, pills, thin liquids, and solid food but what bothers in the most is having foam in his mouth. States some days are better than others with swallowing foods, not every day. He has had a 40lb weight loss in 40 days per patient with little appetite. Denies reflux/indigestion. No previous EGD, colonoscopy, or swallow studies. Chest CT and abdominal CT in 12/2018 found esophagus to be patent and without dilation. Concern for schatzki's ring, cancer induced pseudoachalasia, esophageal spasm, esophagitis and cannot exclude partially obstructing lesion. Also concern for constipation due to last BM a few days ago. ___________________________________________________  RECOMMENDATIONS:      1. Agree with UGI series  2. Changed to soft diet  3. Encouraged small frequent intake, encouraged boost and ensure beverages  4. Will decide if EGD is warranted pending UGI series findings. 5. Agree with KUB to r/o constipation  6. Started PPI daily    Thank you for consultation. Will continue to follow. Discussed Code Status:    []    Full Code      [x]    DNR    ___________________________________________________  Care Plan discussed with:    [x]    Patient   [x]    Family -daughter  [x]    Nursing   [x]    Attending - Dr Mckayla Fernandes  Total Time :  50   minutes   ___________________________________________________  GI: Margarett Landau, NP   Gi Consult Attg Addendum Nura Marino): Pt seen and d/w [address]. Agree with eval and plan as above. In brief, pt is an 79yo M with advanced Lung CA with dysphagia to solids and liquids. Significant WL. Denies GERD/HB, melena. BS delayed today because drank at breakfast. PE with cachexia, CTA, RRR, benign abdomen. Labs and meds reviewed. Need to exclude structure, pseudoachalasia. He is poor candidate for endoscopy given frail health.   Plan:  1) UGI  2) NPO after MN  3) Soft diet for now  4) Daily PPI  5) Check abd x-ray  Sandy Holbrook MD

## 2019-01-31 NOTE — PROGRESS NOTES
Problem: Pressure Injury - Risk of  Goal: *Prevention of pressure injury  Document Michael Scale and appropriate interventions in the flowsheet. Outcome: Progressing Towards Goal  Pressure Injury Interventions:  Sensory Interventions: Assess changes in LOC, Assess need for specialty bed, Minimize linen layers         Activity Interventions: Increase time out of bed    Mobility Interventions: Turn and reposition approx.  every two hours(pillow and wedges)    Nutrition Interventions: Document food/fluid/supplement intake    Friction and Shear Interventions: HOB 30 degrees or less

## 2019-01-31 NOTE — PROGRESS NOTES
IR  I was called because of acute increase in abdominal cramping and pain. I reviewed the chart and IR chest tube replacement images and report. The chest tube placement site is well away from the diaphragm and was an uneventful procedure. I do not think the chest tube has any relationship to his abdominal pain. I have discussed this with his nurse. I also do not see any reason to limit chest tube fluid drainage volume that is going to pleur-evac not on wall suction.

## 2019-01-31 NOTE — PROGRESS NOTES
General Daily Progress Note    Admit Date: 1/30/2019  Hospital day 2    Subjective:     Patient has no complaint of shortness of breath. Developed severe lower abdominal pain last night, lasting approximately one hour. Has been having some constipation and not voiding much. ..   Medication side effects: none    Current Facility-Administered Medications   Medication Dose Route Frequency    allopurinol (ZYLOPRIM) tablet 300 mg  300 mg Oral DAILY    ALPRAZolam (XANAX) tablet 1 mg  1 mg Oral BID    atenolol (TENORMIN) tablet 25 mg  25 mg Oral Q12H    escitalopram oxalate (LEXAPRO) tablet 10 mg  10 mg Oral DAILY    finasteride (PROSCAR) tablet 5 mg  5 mg Oral DAILY    furosemide (LASIX) tablet 80 mg  80 mg Oral DAILY    morphine IR (MS IR) tablet 30 mg  30 mg Oral Q4H PRN    ondansetron (ZOFRAN ODT) tablet 4 mg  4 mg Oral Q8H PRN    polyethylene glycol (MIRALAX) packet 17 g  17 g Oral BID    terazosin (HYTRIN) capsule 10 mg  10 mg Oral QPM    sodium chloride (NS) flush 5-40 mL  5-40 mL IntraVENous Q8H    sodium chloride (NS) flush 5-40 mL  5-40 mL IntraVENous PRN    heparin (porcine) injection 5,000 Units  5,000 Units SubCUTAneous Q12H        Review of Systems  Pertinent items are noted in HPI. Objective:     Patient Vitals for the past 8 hrs:   BP Temp Pulse Resp SpO2   01/31/19 0800 121/72 97.6 °F (36.4 °C) 88 18 98 %   01/31/19 0250 128/80 98 °F (36.7 °C) (!) 103 16 100 %     No intake/output data recorded. No intake/output data recorded. Physical Exam:   Visit Vitals  /72 (BP 1 Location: Right arm, BP Patient Position: At rest)   Pulse 88   Temp 97.6 °F (36.4 °C)   Resp 18   Ht 5' 7\" (1.702 m)   Wt 124 lb (56.2 kg)   SpO2 98%   BMI 19.42 kg/m²     Lungs: clear to auscultation bilaterally  Heart: regular rate and rhythm, S1, S2 normal, no murmur, click, rub or gallop  Abdomen: moderate tenderness in lower quadrants. And midline. Question of bladder distention.    Extremities: edema , 2+ bilaterally. ECG: normal sinus rhythm     Data Review   Recent Results (from the past 24 hour(s))   METABOLIC PANEL, COMPREHENSIVE    Collection Time: 01/31/19  2:50 AM   Result Value Ref Range    Sodium 136 136 - 145 mmol/L    Potassium 4.4 3.5 - 5.1 mmol/L    Chloride 105 97 - 108 mmol/L    CO2 24 21 - 32 mmol/L    Anion gap 7 5 - 15 mmol/L    Glucose 128 (H) 65 - 100 mg/dL    BUN 28 (H) 6 - 20 MG/DL    Creatinine 0.74 0.70 - 1.30 MG/DL    BUN/Creatinine ratio 38 (H) 12 - 20      GFR est AA >60 >60 ml/min/1.73m2    GFR est non-AA >60 >60 ml/min/1.73m2    Calcium 6.9 (L) 8.5 - 10.1 MG/DL    Bilirubin, total 0.6 0.2 - 1.0 MG/DL    ALT (SGPT) 12 12 - 78 U/L    AST (SGOT) 20 15 - 37 U/L    Alk. phosphatase 211 (H) 45 - 117 U/L    Protein, total 5.7 (L) 6.4 - 8.2 g/dL    Albumin 2.5 (L) 3.5 - 5.0 g/dL    Globulin 3.2 2.0 - 4.0 g/dL    A-G Ratio 0.8 (L) 1.1 - 2.2     CBC WITH AUTOMATED DIFF    Collection Time: 01/31/19  2:50 AM   Result Value Ref Range    WBC 7.8 4.1 - 11.1 K/uL    RBC 4.50 4. 10 - 5.70 M/uL    HGB 11.0 (L) 12.1 - 17.0 g/dL    HCT 34.9 (L) 36.6 - 50.3 %    MCV 77.6 (L) 80.0 - 99.0 FL    MCH 24.4 (L) 26.0 - 34.0 PG    MCHC 31.5 30.0 - 36.5 g/dL    RDW 19.3 (H) 11.5 - 14.5 %    PLATELET 558 (L) 809 - 400 K/uL    NRBC 0.0 0  WBC    ABSOLUTE NRBC 0.00 0.00 - 0.01 K/uL    NEUTROPHILS 88 (H) 32 - 75 %    LYMPHOCYTES 6 (L) 12 - 49 %    MONOCYTES 6 5 - 13 %    EOSINOPHILS 0 0 - 7 %    BASOPHILS 0 0 - 1 %    IMMATURE GRANULOCYTES 0 0.0 - 0.5 %    ABS. NEUTROPHILS 6.8 1.8 - 8.0 K/UL    ABS. LYMPHOCYTES 0.4 (L) 0.8 - 3.5 K/UL    ABS. MONOCYTES 0.4 0.0 - 1.0 K/UL    ABS. EOSINOPHILS 0.0 0.0 - 0.4 K/UL    ABS. BASOPHILS 0.0 0.0 - 0.1 K/UL    ABS. IMM.  GRANS. 0.0 0.00 - 0.04 K/UL    DF AUTOMATED             Assessment:     Active Problems:    Chest tube in place (1/30/2019)      Hydropneumothorax (1/30/2019)        Plan:     1. 1. Hydropneumothorax- much better on cxr- will have tube removed this am. Discussed with Dr. Kathy Mcclain- will discuss PleurX catheter with pt. Maybe get in next week. 2. Episode severe lower abdominal pain last night- ?urinary retention- will have bladder scanned and check KUB  3. Dysphagia- will order UGI/Barium swallow- discussed with Dr. Deepti Ovalle  4. Metastatic lung cancer- pt nearing hospice, however, he isnt ready for this yet.  Will ask oncology to see also

## 2019-01-31 NOTE — CONSULTS
[facility] Elliott Bowden  MR#: [id number]  : 1933  ACCOUNT #: [de-identified]   DATE OF SERVICE: 2019    REASON FOR CONSULTATION:  Stage IV non-small cell lung cancer. REFERRING PHYSICIAN:  Herlinda Rod MD    HISTORY OF PRESENT ILLNESS:  The patient is an 80-year-old gentleman who is a patient of one of my partners, Dr. Mary Shine. Dr. Mary Shine has been treating him for non-small cell lung cancer. Most recently, he has been on p.o. Sung Olivares. The patient still is requiring frequent thoracentesis for recurring malignant pleural effusions. The patient was admitted now with a large pneumothorax. He had to have a chest tube put in. The chest tube has been removed. We are asked to see him for further evaluation. He looks very weak, tired, but he wants to go home. We are asked to see him for further evaluation. PAST MEDICAL HISTORY:  Significant for hypertension, history of diabetes, elevated PSA. SOCIAL HISTORY:  He quit smoking in . He does not drink. ALLERGIES:  CIPRO, ERYTHROMYCIN, AND PENICILLIN. CURRENT MEDICATIONS:  He is on allopurinol, Xanax, atenolol, Dulcolax, Proscar, Lasix, Protonix, Hytrin, MiraLax. REVIEW OF SYSTEMS:  Twelve point systems done and negative except for as above. PHYSICAL EXAMINATION:  VITAL SIGNS:  Temperature 97.6, pulse 88, blood pressure 121/72, respirations 18, satting 98% on room air. GENERAL:  Lying in bed in no acute distress. HEENT:  Normocephalic, atraumatic. HEART:  Regular rhythm. LUNGS:  Clear to auscultation bilaterally. ABDOMEN:  Soft, nontender. EXTREMITIES:  No clubbing, cyanosis or edema. NEUROLOGIC:  Nonfocal.    LABORATORY DATA:  White blood cell count 7.8, hemoglobin 11.0, platelets 960. Chemistry:  Sodium 136, BUN 28, creatinine 0.74, total bilirubin 0.6, ALT 12, AST 20, alkaline phosphatase 211.     ASSESSMENT AND PLAN:  The patient is an 80-year-old gentleman with stage IV non-small cell lung cancer. He has recently been on Yue Cypher; however, he has continued to have recurrent pleural effusions that have had to be tapped. He had a pneumothorax with his most recent one, had to have a chest tube put in and that has been removed. I would recommend possibly considering an Aspira catheter with his next thoracentesis. That way he can have the fluid taken off at home. I discussed the case with Dr. Ritchie Carvajal. The Yue Cypher is not working. Dr. Ritchie Carvajal does not think he could tolerate any more aggressive treatment at this point. If he is still here, Dr. Ritchie Carvajal will follow up tomorrow. If he is discharged, he should follow up with Dr. Ny Molina in the office. Please call with any questions.       MD ROSARIO Miller / MAGDA  D: 01/31/2019 12:26     T: 01/31/2019 13:11  JOB #: [id number]

## 2019-01-31 NOTE — PHYSICIAN ADVISORY
Letter of Status Determination: Current Status   INPATIENT is Appropriate         Pt Name:  Raeford Buerger   MR#  [id number]   [facility]#   [id number]   Room and Hospital  1142/01  @ [facility]   Hospitalization date  1/30/2019  5:12 PM   Current Attending Physician  Blayne Parikh MD   Principal diagnosis  Chest pain, dysphagia    Clinicals    The patient is a pleasant but unfortunate 72-year-old white male with a history of a large right upper lobe lung cancer who was admitted for chest tube drainage of a hydropneumothorax. Patient was diagnosed with an adenocarcinoma of the right lung in 07/2018. At that time was noted to have a large right upper lung mass with collapse of the right upper lobe. He had a large right pleural effusion at the time. He also had right hilar and mediastinal adenopathy. He has been followed by Dr. Sully Stack of oncology and currently has been treated with p.fabiola Julien for this. He has been getting frequent thoracentesis due to recurrent right pleural effusions causing severe dyspnea. He was seen in the office yesterday and noted increasing shortness of breath. Patient also noted that for the last several weeks, he has been having difficulty swallowing, spitting up lots of foam.  He also was having difficulty burping. It was advised for him to be admitted to the hospital yesterday, as he looked quite short of breath; however, he did not want to go. He did agree for repeat thoracentesis and we were going to also try to schedule an upper GI/barium swallow. When seen today, an ultrasound as well as the hydrothorax was also noted to have a large pneumothorax also. A chest tube was placed by Interventional Radiology and the patient was admitted for further evaluation. Currently, the patient, while lying in bed, is fairly comfortable, is not complaining of any chest pain and actually his breathing looks much better than yesterday.   He has received both morphine for pain and Xanax for anxiety. His daughter is with him tonight. Pt with chest tube placement, needs dysphagia workup       [place] MCG criteria       STATUS DETERMINATION  On the basis of clinical data, available documentaion, we believe that the current status of this patient as INPATIENT is Appropriate   The final decision of the patient's hospitalization status depends on the attending physician's judgment    Additional comments     Insurance  Payor: [address] [place] [address] / Plan: [place] & [place] / Product Type: Medicare /    Insurance Information                [facility] PART A & B Phone:     Subscriber: Jose Olp. Subscriber#: [id number]    Group#:  Precert#:         [employer] A[employer] SUPPLEMENT MEDICARE Phone:     Subscriber: Jose Olp.  Subscriber#: [id number]    Group#:  Precert#:                    Jovana Smith MD  Cell: [phone]  Physician Advisor

## 2019-01-31 NOTE — PROGRESS NOTES
Confirmed with Dr. Jenna Gallegos that chest tube should continue through night to gravity. Report from radiology stated to potentially clamp chest tube at 1000ml output. Dr. Jenna Gallegos stated to allow chest tube to drain and not clamp at 1000.

## 2019-02-01 ENCOUNTER — HOSPICE ADMISSION (OUTPATIENT)
Dept: HOSPICE | Facility: HOSPICE | Age: 84
End: 2019-02-01

## 2019-02-01 ENCOUNTER — APPOINTMENT (OUTPATIENT)
Dept: GENERAL RADIOLOGY | Age: 84
DRG: 186 | End: 2019-02-01
Attending: FAMILY MEDICINE
Payer: MEDICARE

## 2019-02-01 VITALS
OXYGEN SATURATION: 98 % | SYSTOLIC BLOOD PRESSURE: 95 MMHG | TEMPERATURE: 97.9 F | WEIGHT: 124 LBS | BODY MASS INDEX: 19.46 KG/M2 | DIASTOLIC BLOOD PRESSURE: 45 MMHG | RESPIRATION RATE: 16 BRPM | HEIGHT: 67 IN | HEART RATE: 60 BPM

## 2019-02-01 PROCEDURE — 74018 RADEX ABDOMEN 1 VIEW: CPT

## 2019-02-01 PROCEDURE — 74011250637 HC RX REV CODE- 250/637: Performed by: FAMILY MEDICINE

## 2019-02-01 PROCEDURE — 74011250636 HC RX REV CODE- 250/636: Performed by: FAMILY MEDICINE

## 2019-02-01 RX ORDER — FUROSEMIDE 40 MG/1
TABLET ORAL
Qty: 90 TAB | Refills: 3 | Status: SHIPPED
Start: 2019-02-01

## 2019-02-01 RX ORDER — ALPRAZOLAM 0.5 MG/1
1 TABLET ORAL
Status: DISCONTINUED | OUTPATIENT
Start: 2019-02-01 | End: 2019-02-01 | Stop reason: HOSPADM

## 2019-02-01 RX ORDER — FUROSEMIDE 40 MG/1
40 TABLET ORAL DAILY
Status: DISCONTINUED | OUTPATIENT
Start: 2019-02-01 | End: 2019-02-01 | Stop reason: HOSPADM

## 2019-02-01 RX ADMIN — ONDANSETRON 4 MG: 4 TABLET, ORALLY DISINTEGRATING ORAL at 02:03

## 2019-02-01 RX ADMIN — FINASTERIDE 5 MG: 5 TABLET, FILM COATED ORAL at 09:01

## 2019-02-01 RX ADMIN — MORPHINE SULFATE 30 MG: 15 TABLET ORAL at 10:24

## 2019-02-01 RX ADMIN — Medication 5 ML: at 01:55

## 2019-02-01 RX ADMIN — ATENOLOL 25 MG: 25 TABLET ORAL at 01:54

## 2019-02-01 RX ADMIN — Medication 5 ML: at 06:47

## 2019-02-01 RX ADMIN — TERAZOSIN HYDROCHLORIDE 10 MG: 5 CAPSULE ORAL at 01:54

## 2019-02-01 RX ADMIN — MORPHINE SULFATE 30 MG: 15 TABLET ORAL at 02:24

## 2019-02-01 RX ADMIN — HEPARIN SODIUM 5000 UNITS: 5000 INJECTION INTRAVENOUS; SUBCUTANEOUS at 01:54

## 2019-02-01 RX ADMIN — ALPRAZOLAM 1 MG: 0.5 TABLET ORAL at 01:54

## 2019-02-01 NOTE — HOSPICE
[facility] Help to Those in Need  [phone]    Patient Name: Jackie Deutsch  YOB: 1933  Age: 80 y.o. [facility]:  Hospice consult noted. Chart reviewed. Plan of care discussed with patients care manager. This LCSW met with pt and his daughter Nicole for hospice informaitional.   Discussed Hospice philosophy, general plan of care, levels of care, services and on call procedures. Family information packet provided & reviewed with pt and Nicole. Per Hospice RN pt is not inpt appropriate at this time. The goal identified by family is hospice at home. Nicole reported she has been staying with pt since 1/1/2019. Daughter reports she will be pts pcg. Per daughter pts wife Gloria Souza has left the home and they are unaware of her whereabouts. Daughter is interested in hospice services for her father, but would like to discuss hospice with her father and brother. [facility] at [facility] at [facility] time. [facility] ( [phone]) to request a referral for another hospice in their area. .    Thank you for the opportunity to be of service to Mr. Marilin Clemons and his daughter.     Zenobia Rodriguez [facility], [address] [place] [address] [facility] [place]  [phone]

## 2019-02-01 NOTE — PROGRESS NOTES
Oncology End of Shift Note      Bedside shift change report given to [facility], RN (incoming nurse) by Abraham Panchal (outgoing nurse) on [employer]. Report included the following information SBAR, Kardex and MAR. Shift Summary: Controlled pt pain and nausea. Inserted rivera due to urinary retention. Pt had chest tube removed. Pt had x-ray of chest.      Issues for Physician to Address:  None. Patient on Cardiac Monitoring?     [] Yes  [x] No    Rhythm: Telemetry: normal sinus rhythm         Shift Events        Mahsa Bell

## 2019-02-01 NOTE — PROGRESS NOTES
Patient's daughter request that patient be allowed to sleep at this time. Daughter will notify staff when patient is awake to take night medications.

## 2019-02-01 NOTE — PROGRESS NOTES
General Daily Progress Note    Admit Date: 1/30/2019  Hospital day 3    Subjective:     Patient has no complaint of abdominal pain. Has rivera in. Mild dyspnea. Slept well last night. Getting barium swallow /UGI  This am. .   Medication side effects: hypersomnolence    Current Facility-Administered Medications   Medication Dose Route Frequency    furosemide (LASIX) tablet 40 mg  40 mg Oral DAILY    pantoprazole (PROTONIX) granules for oral suspension 40 mg  40 mg Oral ACB    polyethylene glycol (MIRALAX) packet 17 g  17 g Oral DAILY    zinc oxide-cod liver oil (DESITIN) 40 % paste   Topical BID    allopurinol (ZYLOPRIM) tablet 300 mg  300 mg Oral DAILY    ALPRAZolam (XANAX) tablet 1 mg  1 mg Oral BID    atenolol (TENORMIN) tablet 25 mg  25 mg Oral Q12H    escitalopram oxalate (LEXAPRO) tablet 10 mg  10 mg Oral DAILY    finasteride (PROSCAR) tablet 5 mg  5 mg Oral DAILY    morphine IR (MS IR) tablet 30 mg  30 mg Oral Q4H PRN    ondansetron (ZOFRAN ODT) tablet 4 mg  4 mg Oral Q8H PRN    polyethylene glycol (MIRALAX) packet 17 g  17 g Oral BID    terazosin (HYTRIN) capsule 10 mg  10 mg Oral QPM    sodium chloride (NS) flush 5-40 mL  5-40 mL IntraVENous Q8H    sodium chloride (NS) flush 5-40 mL  5-40 mL IntraVENous PRN    heparin (porcine) injection 5,000 Units  5,000 Units SubCUTAneous Q12H        Review of Systems  Pertinent items are noted in HPI. Objective:     No data found. 02/01 0701 - 02/01 1900  In: -   Out: 350 [Urine:350]  No intake/output data recorded. Physical Exam:   Visit Vitals  /71 (BP 1 Location: Right arm, BP Patient Position: At rest)   Pulse (!) 108   Temp 98.1 °F (36.7 °C)   Resp 16   Ht 5' 7\" (1.702 m)   Wt 124 lb (56.2 kg)   SpO2 97%   BMI 19.42 kg/m²     Lungs: diminished breath sounds R apex, R anterior, R base  Heart: regular rate and rhythm, S1, S2 normal, no murmur, click, rub or gallop  Abdomen: soft, non-tender.  Bowel sounds normal. No masses,  no organomegaly  Extremities: extremities normal, atraumatic, no cyanosis or edema      ECG: normal sinus rhythm     Data Review No results found for this or any previous visit (from the past 24 hour(s)). Assessment:     Active Problems:    Chest tube in place (1/30/2019)      Hydropneumothorax (1/30/2019)        Plan:     1. 1. Hydropneumothorax- much better on cxr- will have tube removed this am.   Discussed with Dr. Hugo Quintanlila- will discuss PleurX catheter with pt. Maybe get in next week. 2. Urinary retention- has rivera in now. 3. Dysphagia- getting UGI/Barium swallow- discussed with Dr. Ni Agent  4. Metastatic lung cancer- pt nearing hospice, however, he isnt ready for this yet. Appreciate Dr. Nasrin Cole help. Have discussed hospice with pt and daughter this am. I think this is the way to go. Will consult hospice. Depending on UGI results, may be able to get home later today.

## 2019-02-01 NOTE — PROGRESS NOTES
CM received alert of Pt family requesting for Home Hospice. [facility]OC offered. Pt selected [facility]. CM made referral to [facility]. Pt daughter America Farias #[phone] will transport Pt home. [facility] reported they are bale to acept Pt for home hospice.     Luis Manuel Flores, SHELDONW  Ext # [phone]

## 2019-02-01 NOTE — PROGRESS NOTES
Reason for Admission:   Chest tube drainage of hydropneumothorax - metastatiic lung cancer               RRAT Score:     35             Resources/supports as identified by patient/family:   Daughter - Medicare and [address] facing patient (as identified by patient/family and CM): Finances/Medication cost?      Medicare and AARP with Part D              Transportation? Daughter provides all transportation              Support system or lack thereof? Patient's daughter is only \"close suupport\" - several cousins live in the area and can be contacted as needed - daughter states concern about becoming \"exhausted with care\"                     Living arrangements? Private one story home           Self-care/ADLs/Cognition? Patient alert and oriented x 3 - CM unsure of patient's reality of extent of disease          Current Advanced Directive/Advance Care Plan:  Patient has DNR status on chart - No current ACP or DDNR                          Plan for utilizing home health:    No Hx of previous home health - open to assistance if needed                      Likelihood of readmission: High - patient approaching hospice care per MD notes                 Transition of Care Plan:                CM in to see patient and his daughter - introduced self and role of CM. All demographic information verified and initial assessment completed. Patient states his reluctance to stay at the hospital due to \"being tired\" but has discussed with his daughter and agrees to stay until speaking further to Dr. Stefania Aberanthy and other doctors. Anticipate that patient will need home health if not hospice at discharge. CM will remain available to assist with discharge planning as needed. At very least, will provide patient's daughter with private duty caregiver list of providers.     Care Management Interventions  PCP Verified by CM: Yes(Dr. Yaritza Yañez)  Transition of Care Consult (CM Consult): Other(Iniitial CM Assessment)  MyChart Signup: No  Discharge Durable Medical Equipment: No(Pt has rolling walker - denies other DME)  Physical Therapy Consult: No  Occupational Therapy Consult: No  Speech Therapy Consult: No  Current Support Network: Own Home, Lives with Caregiver(Daughter living with patient - providing 24 hour care)  Confirm Follow Up Transport: Family  Plan discussed with Pt/Family/Caregiver:  Yes    Tripp Kramer, RN, BSN, [address] [place] [address]     [phone]

## 2019-02-01 NOTE — PROGRESS NOTES
GI Progress Note Pradip Henning)  NAME:Don Burris KK JZZ:[id number]   ATTG: LOUANN Meier MD  PCP: Divya Sanford MD  Date/Time:  2019 11:04 AM   Assessment:   · Dysphagia- he has tolerated liquids here. Suspect some degree of motility disturbance in setting of age, narcotic induced motility changes, and tumor nerve involvement. He is unable to do the Barium Swallow  · Constipation     Plan:   · Continue Miralax and Dulcolax  · Avoid excessive narcotics  · Endoscopy and/or later PEG is not appropriate in this patient with met lung CA  · Consider for hospice   Will sign off  Subjective:   Discussed with RN events overnight. Unable to do BS, but has been taking sips    Complaint Y/N Description   Abdominal Pain n    Hematemesis n    Hematochezia n    Melena n    Constipation y    Diarrhea n    Dyspepsia n    Dysphagia n    Jaundiced n    Nausea/vomiting n      Review of Systems:  Symptom Y/N Comments  Symptom Y/N Comments   Fever/Chills n   Chest Pain n    Cough n   Headaches n    Sputum n   Joint Pain n    SOB/CARTAGENA n   Pruritis/Rash n    Tolerating Diet y mech   Other       Could NOT obtain due to:      Objective:   VITALS:   Last 24hrs VS reviewed since prior progress note. Most recent are:  Visit Vitals  BP 95/45   Pulse 60   Temp 97.9 °F (36.6 °C)   Resp 16   Ht 5' 7\" (1.702 m)   Wt 56.2 kg (124 lb)   SpO2 98%   BMI 19.42 kg/m²       Intake/Output Summary (Last 24 hours) at 2019 1104  Last data filed at 2019 0736  Gross per 24 hour   Intake --   Output 350 ml   Net -350 ml     PHYSICAL EXAM:  General: WD, WN. Alert, cooperative, no acute distress. Cachectic  HEENT: NC, Atraumatic. PERRL. Anicteric sclerae. Lungs:  CTA Bilaterally. No Wheezing/Rhonchi/Rales. Heart:  Regular  rhythm,  No murmur/Rub/Gallops  Abdomen: Soft, Non distended, Non tender.  +BS  Extremities: No c/c/e  Neurologic:  CN 2-12 gi, A/O X 3.   No acute neurological distress   Psych:   Variable insight. Not anxious nor agitated. Lab and Radiology Data Reviewed: (see below)  ABD X-RAY 2/1/19  KUB obtained before he scheduled upper GI. There is prominent fecal stasis. Diffuse bony metastases. Patient was unable to drink contrast for the  examination. IMPRESSION:  Upper GI scans of due to patient inability to drink contrast, bony  metastases, fecal stasis. Medications Reviewed: (see below)  PMH/SH reviewed - no change compared to H&P  ________________________________________________________________________  Total time spent with patient: 15 minutes ________________________________________________________________________  Care Plan discussed with:  Patient y   Family     RN y              Consultant:       Long Cintron MD     Procedures: see electronic medical records for all procedures/Xrays and details which were not copied into this note but were reviewed prior to creation of Plan. LABS:  Recent Labs     01/31/19 0250   WBC 7.8   HGB 11.0*   HCT 34.9*   *     Recent Labs     01/31/19 0250 01/29/19  1414    140   K 4.4 5.0    100   CO2 24 20   BUN 28* 24   CREA 0.74 0.79   * 108*   CA 6.9* 7.3*     Recent Labs     01/31/19 0250 01/29/19  1414   SGOT 20 20   * 222*   TP 5.7* 6.1   ALB 2.5* 3.4*   GLOB 3.2  --      No results for input(s): INR, PTP, APTT in the last 72 hours. No lab exists for component: INREXT   No results for input(s): FE, TIBC, PSAT, FERR in the last 72 hours. No results found for: FOL, RBCF  No results for input(s): PH, PCO2, PO2 in the last 72 hours. No results for input(s): CPK, CKMB in the last 72 hours.     No lab exists for component: TROPONINI  Lab Results   Component Value Date/Time    Color YELLOW/STRAW 01/15/2019 08:56 PM    Appearance CLEAR 01/15/2019 08:56 PM    Specific gravity 1.027 01/15/2019 08:56 PM    pH (UA) 6.5 01/15/2019 08:56 PM    Protein 30 (A) 01/15/2019 08:56 PM    Glucose NEGATIVE  01/15/2019 08:56 PM    Ketone 15 (A) 01/15/2019 08:56 PM    Bilirubin NEGATIVE  01/15/2019 08:56 PM    Urobilinogen 1.0 01/15/2019 08:56 PM    Nitrites NEGATIVE  01/15/2019 08:56 PM    Leukocyte Esterase NEGATIVE  01/15/2019 08:56 PM    Epithelial cells FEW 01/15/2019 08:56 PM    Bacteria NEGATIVE  01/15/2019 08:56 PM    WBC 0-4 01/15/2019 08:56 PM    RBC 0-5 01/15/2019 08:56 PM       MEDICATIONS:  Current Facility-Administered Medications   Medication Dose Route Frequency    furosemide (LASIX) tablet 40 mg  40 mg Oral DAILY    ALPRAZolam (XANAX) tablet 1 mg  1 mg Oral BID PRN    pantoprazole (PROTONIX) granules for oral suspension 40 mg  40 mg Oral ACB    polyethylene glycol (MIRALAX) packet 17 g  17 g Oral DAILY    zinc oxide-cod liver oil (DESITIN) 40 % paste   Topical BID    allopurinol (ZYLOPRIM) tablet 300 mg  300 mg Oral DAILY    atenolol (TENORMIN) tablet 25 mg  25 mg Oral Q12H    escitalopram oxalate (LEXAPRO) tablet 10 mg  10 mg Oral DAILY    finasteride (PROSCAR) tablet 5 mg  5 mg Oral DAILY    morphine IR (MS IR) tablet 30 mg  30 mg Oral Q4H PRN    ondansetron (ZOFRAN ODT) tablet 4 mg  4 mg Oral Q8H PRN    terazosin (HYTRIN) capsule 10 mg  10 mg Oral QPM    sodium chloride (NS) flush 5-40 mL  5-40 mL IntraVENous Q8H    sodium chloride (NS) flush 5-40 mL  5-40 mL IntraVENous PRN    heparin (porcine) injection 5,000 Units  5,000 Units SubCUTAneous Q12H

## 2019-02-01 NOTE — PROGRESS NOTES
Hematology Oncology Progress Note           Follow up for: metastatic lung cancer to pleura    Chart notes reviewed since last visit. Case discussed with following: family member at bedside. Patient complains of the following: he is very sedated at present. Family member reports he got lots of medications - Xanax at 1 AM and still very groggy. Briefly denied pain but unable to have meaningful conversation. Additional concerns noted by the staff:     Patient Vitals for the past 24 hrs:   BP Temp Pulse Resp SpO2   01/31/19 2321 119/71 98.1 °F (36.7 °C) (!) 108 16 97 %   01/31/19 1908 130/67 97.6 °F (36.4 °C) 99 16 100 %   01/31/19 1713 122/76 97.6 °F (36.4 °C) 93 13 94 %   01/31/19 0800 121/72 97.6 °F (36.4 °C) 88 18 98 %       ROS unable to be obtained - too groggy      Physical Examination:  Constitutional Lethargic s/p xanax. Mood and affect appropriate. Appears close to chronological age. thin   Head Normocephalic; no scars   Eyes Conjunctivae and sclerae are clear and without icterus. Pupils are round   ENMT Sinuses are nontender. No oral exudates, ulcers, masses, thrush or mucositis. Oropharynx clear. Tongue normal.   Neck Supple without masses or thyromegaly. No jugular venous distension. Hematologic/Lymphatic No petechiae or purpura. No tender or palpable lymph nodes in the cervical, supraclavicular, axillary or inguinal area. Respiratory Lungs are decreased at right   Cardiovascular Regular rate and rhythm of heart without murmurs, gallops or rubs. Chest / Line Site Chest is symmetric with no chest wall deformities. Abdomen Non-tender, non-distended, no masses, ascites or hepatosplenomegaly. Good bowel sounds. Musculoskeletal No tenderness or swelling,    Extremities No visible deformities, no cyanosis, clubbing or edema. Skin No rashes, scars, or lesions suggestive of malignancy. No petechiae, purpura, or ecchymoses. No excoriations.     Neurologic No sensory or motor deficits noted but not tested   Psychiatric Lethargic s/p xanax. Labs:  No results found for this or any previous visit (from the past 24 hour(s)). Imaging:  Cxray 1/31/19: FINDINGS:   A portable AP radiograph of the chest was obtained at 0801 hours. Lines and tubes: The patient is on a cardiac monitor. Lungs: There is atelectasis on the right. The left lung is well expanded and  clear. Pleura: There is a new right pleural pigtail catheter with decreased pleural  effusion and resolution of the pneumothorax. Mediastinum: The cardiac and mediastinal contours and pulmonary vascularity are  normal.  Bones and soft tissues: The bones and soft tissues are grossly within normal  limits. There is a sclerotic lesion of the left fifth rib.       IMPRESSION: Right pleural pigtail catheter with decreased pneumothorax    Assessment and Plan:   Metastatic lung cancer with recurrent pleural effusion - chest tube removed. He would be a candidate for an aspira catheter when the effusion reaccumulates enough to place safely. His oral chemothereputic is not working and may be stopped. He is not a candidate for more traditional chemotherapy due to his weakness (and previously stated wishes) - would favor a comfort care approach. He is not awake enough at present for me to discuss this with him but I reviewed it with his family member at bedside. Urinary retention - rivera placed.

## 2019-02-01 NOTE — PROGRESS NOTES
Discussed with daughter. They dont want to stay today for pleurX catheter placement. Will discharge home, will try to start hospice this weekend.

## 2019-02-01 NOTE — HOSPICE
[facility]  [phone]     Patient Name: Jair Dockery  YOB: 1933  Age: 80 y.[facility]. [facility]  Hospice consult received, reviewing chart. Will follow up with Unit Nurse and Care Manager to discuss plan of care, patient status and discharge disposition within the day. Thank you for the opportunity to be of service to this patient.

## 2019-02-01 NOTE — DISCHARGE INSTRUCTIONS
PATIENT DISCHARGE INSTRUCTIONS      PATIENT DISCHARGE INSTRUCTIONS    Fantasma Conway / [id number] : 1933    Admitted 2019 Discharged: 2019       · It is important that you take the medication exactly as they are prescribed. · Keep your medication in the bottles provided by the pharmacist and keep a list of the medication names, dosages, and times to be taken in your wallet. · Do not take other medications without consulting your doctor. What to do at Home    Recommended Diet: Regular Diet    Recommended Activity: Activity as tolerated    If you experience any of the following symptoms increasing shortness of breath, please follow up with Dr. Mckayla Fernandes.         Signed By: Candance Ngo, MD     2019

## 2019-02-02 NOTE — DISCHARGE SUMMARY
[facility] Alec Chong  MR#: [id number]  : 1933  ACCOUNT #: [de-identified]   ADMIT DATE: 2019  DISCHARGE DATE: 2019    FINAL DIAGNOSES:  1. Metastatic lung cancer. 2.  Urinary retention. 3.  Right hydropneumothorax. 4.  Dysphagia. DISPOSITION:  The patient discharged home on hospice. CONSULTATIONS:  Radiology, Dr. Lesly Benjamin and also Hematology/Oncology, Dr. Ashtyn Elmore and Dr. Jose E Barrios:  Chest tube placement. DISCHARGE MEDICATIONS:  Furosemide 40 mg 1 daily, Protonix 40 mg daily, morphine IR 15 mg 2 every 4-6 hours, alprazolam 1 mg twice daily, Lexapro 10 mg daily, Zofran 4 mg p.r.n., MiraLax 17 g twice daily, Megace 20 mg daily, terazosin 10 mg daily, piroxicam 20 mg daily, Neurontin 300 mg t.i.d., allopurinol 300 mg daily, finasteride 5 mg daily. The patient was also discharged home with a urinary catheter in place. HISTORY OF PRESENT ILLNESS:  The patient is an unfortunate 57-year-old white male with history of metastatic lung cancer. He has been "[employer]" and being followed by Dr. Ashtyn Elmore. He has been requiring frequent thoracentesis. For the last 2 weeks, he has had progressive problems with dysphagia and spitting up saliva and liquids. He has also been having progressive swelling in his feet. He was seen on 2019. An outpatient thoracentesis was arranged. When the patient did go to the hospital, he also was going to go for an outpatient barium swallow, however, the following morning when seen by Radiology was noted to have a right hydropneumothorax and a chest tube placement was advised. The patient was admitted. Chest tube was placed. PAST MEDICAL HISTORY:  Significant for hypertension, diabetes, elevated PSA with 2 negative biopsies, history of gout, history of lung cancer diagnosed this past summer. Followed by Dr. Ashtyn Elmore on [place].      PAST SURGICAL HISTORY:  Includes multiple thoracenteses for recurrent right pleural effusion, colonoscopy. He has had his hernia repaired. SOCIAL HISTORY:  Smoking the patient quit in 1965. Alcohol use is negative. ALLERGIES:  CIPRO, ERYTHROMYCIN, PENICILLIN. MEDICATIONS:  Include admission were alprazolam 1 mg b.i.d., allopurinol 300 mg daily, albuterol inhaler p.r.n., atenolol 50 mg 1/2 tab b.i.d., Cozaar 25 mg daily, Zyrtec 10 mg daily, finasteride 5 mg daily,  hydrochlorothiazide 25 mg daily, pravastatin 20 mg daily, terazosin 10 mg daily, aspirin 325 mg daily. HOSPITAL COURSE:  The patient retired. He has 2 children from an early marriage. He has been having some major marital problems with his second wife who basically left him approximately a month ago. His daughter, Steve Nevarez, moved up from [facility] has been very active and caring for him. REVIEW OF SYSTEMS:  No past history of coronary artery disease. Had been complaining of some lower chest pain which is fairly constant felt to be related to his lung cancer. RESPIRATORY:  As above. GASTROINTESTINAL:  Appetite has been poor dysphagia as noted above. GENITOURINARY:  He takes finasteride and terazosin for BPH. General he has been feeling weak, losing weight. EAR, NOSE AND THROAT:  History of allergic rhinitis which he takes Claritin. Neuro has had some progressive difficulty walking. MUSCULOSKELETAL:  No complaint of back pain or headaches. PHYSICAL EXAMINATION:  GENERAL:  The patient alert, oriented, pleasant, cooperative, ill-appearing, mildly tachypneic 70-year-old white male. VITAL SIGNS:  Temperature 97.9, pulse 94, blood pressure 112/64, O2 sat 97% on room air. GENERAL:  The patient lying in bed, appears comfortable. He has a chest tube in place. LUNGS:  Absent breath sounds on the right side. CARDIOVASCULAR:  Regular rhythm and rate. No murmurs, rubs or gallops. ABDOMEN:  Soft, nontender. EXTREMITIES:  2+ edema bilaterally.   NEUROLOGIC: Nonfocal.    LABORATORY EXAM:  BUN is 24, creatinine 0.9, alkaline phosphatase 222. HOSPITAL COURSE:  The patient was admitted. He developed severe lower abdominal pain the night of admission. Did get somewhat better the next morning was felt to have a distended bladder on exam.  Bladder scanning revealed over 500 mL of urine in his bladder. He was initially reluctant to have a catheter placed, but eventually this was placed. Flat and upright KUB did show mild amount of stool, otherwise was normal.  The following morning, his chest tube was removed. We tried to do an upper GI barium swallow. However, the patient would not drink barium was also seen in consultation by Dr. Elizabeth Parikh who felt like a barium swallow was appropriate. He was reluctant to do an EGD due to the patient's severe illness. Patient is followed by Dr. Sylvain Navas and Dr. Nu Castro. Dr. Nu Castro felt that he was too weak to continue to [address] [place] [address]. It was felt by everybody involved that the patient should start on hospice. He finally consented to this. His daughter was also in favor of this. It was debated whether he could have a PleurX catheter placed; however, the patient just wanted to leave the hospital.  He was discharged home on 02/01. Hospices saw the patient hospital and this will be started shortly.       Ana Delatorre MD MS/JONNY  D: 02/02/2019 08:46     T: 02/02/2019 09:40  JOB #: [id number]

## 2019-02-03 ENCOUNTER — HOSPITAL ENCOUNTER (EMERGENCY)
Age: 84
Discharge: HOME | End: 2019-02-03
Attending: EMERGENCY MEDICINE
Payer: MEDICARE

## 2019-02-03 VITALS
SYSTOLIC BLOOD PRESSURE: 102 MMHG | BODY MASS INDEX: 20.16 KG/M2 | HEART RATE: 90 BPM | DIASTOLIC BLOOD PRESSURE: 43 MMHG | TEMPERATURE: 97.2 F | HEIGHT: 65 IN | RESPIRATION RATE: 16 BRPM | WEIGHT: 121 LBS | OXYGEN SATURATION: 95 %

## 2019-02-03 DIAGNOSIS — R62.7 FAILURE TO THRIVE IN ADULT: ICD-10-CM

## 2019-02-03 DIAGNOSIS — C34.12 MALIGNANT NEOPLASM OF UPPER LOBE OF LEFT LUNG (HCC): ICD-10-CM

## 2019-02-03 DIAGNOSIS — R33.9 URINARY RETENTION: Primary | ICD-10-CM

## 2019-02-03 PROCEDURE — 99282 EMERGENCY DEPT VISIT SF MDM: CPT

## 2019-02-03 PROCEDURE — 77030005530 HC CATH URETH FOL40 BARD -B

## 2019-02-03 NOTE — ED NOTES
Pt arrives to ED via triage c/o abd pain, pt states his last BM was yesterday, pt has rivera in place which was put in on 1/31 for retention. Pts bladder visibly distended on exam and tender to touch, bladder scan shows 300 cc, attempted to irrigate rivera is unsuccessful, daughter states the rivera stopped draining yesterday, 50 ml of tea colored urine in bag. Rivera replaced, 400 ml of dark yellow urine present.

## 2019-02-03 NOTE — ED PROVIDER NOTES
EMERGENCY DEPARTMENT HISTORY AND PHYSICAL EXAM 
 
 
Date: 2/3/2019 Patient Name: Jair Dockery History of Presenting Illness Chief Complaint Patient presents with  Abdominal Pain LLQ cramping pain since yesterday. pt discharged from hospital on friday. He had to have his lung drained. hx of lung cancer. family member thinks he may be constipated. History Provided By: Patient and Patient's Daughter HPI: Jair Dockery, 80 y.o. male with PMHx significant for CA lung, DM, presents via private vehicle to the ED with cc of cramping abd pain x 1 day. Pt reports associated constipation. Pt describes his pain as a 4/10. Pt's daughter reports \"He feels like he has to poop but says it feels like something is blocking it. \" Pt's daughter says his urinary catheter has not been draining properly and could be the reason for his abd pain. Pt notes he has lung cancer, but has stopped his chemotherapy treatment. .  
 
There are no other complaints, changes, or physical findings at this time. PCP: Giorgio Brown MD 
 
No current facility-administered medications on file prior to encounter. Current Outpatient Medications on File Prior to Encounter Medication Sig Dispense Refill  furosemide (LASIX) 40 mg tablet 1  tab po daily for fluid 90 Tab 3  pantoprazole (PROTONIX) 40 mg granules for oral suspension Take 40 mg by mouth Daily (before breakfast). 30 Each 1  
 morphine IR (MS IR) 15 mg tablet 2 tabs po every 4-6 hours as needed for pain 90 Tab 0  ALPRAZolam (XANAX) 1 mg tablet TAKE ONE TABLET BY MOUTH TWICE DAILY AS NEEDED FOR ANXIETY 60 Tab 3  
 escitalopram oxalate (LEXAPRO) 10 mg tablet Take 1 Tab by mouth daily. 30 Tab 5  
 ondansetron (ZOFRAN ODT) 4 mg disintegrating tablet Take 1 Tab by mouth every eight (8) hours as needed for Nausea. 30 Tab 5  polyethylene glycol (MIRALAX) 17 gram/dose powder Take 17 g by mouth two (2) times a day.  850 g 5  
  megestrol (MEGACE) 20 mg tablet Take 1 Tab by mouth daily. For appetite 30 Tab 5  terazosin (HYTRIN) 10 mg capsule TAKE ONE CAPSULE BY MOUTH ONCE DAILY FOR BLOOD PRESSURE AND PROSTATE 90 Cap 12  piroxicam (FELDENE) 20 mg capsule Take 1 Cap by mouth daily. For pain 30 Cap 5  
 gabapentin (NEURONTIN) 300 mg capsule One tab po qhs- 1st week. Then 1 tab po bid- 2nd week. Then 1 tab po tid. For pain in feet 90 Cap 5  
 allopurinol (ZYLOPRIM) 300 mg tablet TAKE ONE TABLET BY MOUTH ONCE DAILY TO PREVENT GOUT 90 Tab 12  
 cetirizine (ZYRTEC) 10 mg tablet Take 1 Tab by mouth daily. 15 Tab 5  
 finasteride (PROSCAR) 5 mg tablet Take 1 Tab by mouth daily. For prostate 30 Tab 12  
 VIT C/E/ZN/COPPR/LUTEIN/ZEAXAN (PRESERVISION AREDS 2 PO) Take  by mouth daily.  MULTIVITAMINS (MULTIPLE VITAMINS PO) Take 1 Tab by mouth daily. Past History Past Medical History: 
Past Medical History:  
Diagnosis Date  BP (high blood pressure) 2010  Cancer ([facility] [address] Lung  Diabetes mellitus, type 2 ([facility] [address]  Elevated PSA 2010  Elevated PSA   
 has had 2 biopsies, both were negative for cancer  Gout 2010 Past Surgical History: 
Past Surgical History:  
Procedure Laterality Date  CHEST SURGERY PROCEDURE UNLISTED Previous thoracenteses  HX COLONOSCOPY    
 HX HERNIA REPAIR    
 IR THORACENTESIS/INSERT CHEST TUBE  2019 Family History: 
Family History Problem Relation Age of Onset  Diabetes Brother Social History: 
Social History Tobacco Use  Smoking status: Former Smoker Last attempt to quit: 1965 Years since quittin.1  Smokeless tobacco: Never Used  Tobacco comment: 9409 Substance Use Topics  Alcohol use: No  
 Drug use: No  
 
 
Allergies: Allergies Allergen Reactions  Ciprofloxacin Other (comments) Nauseated, felt fuzzy  Erythromycin Unknown (comments)  Pcn [Penicillins] Swelling Review of Systems Review of Systems Constitutional: Negative for chills and fever. HENT: Negative for congestion and sore throat. Eyes: Negative for visual disturbance. Respiratory: Negative for cough and shortness of breath. Cardiovascular: Negative for chest pain and leg swelling. Gastrointestinal: Positive for abdominal pain and constipation. Negative for blood in stool, diarrhea and nausea. Endocrine: Negative for polyuria. Genitourinary: Negative for dysuria and testicular pain. Musculoskeletal: Negative for arthralgias, joint swelling and myalgias. Skin: Negative for rash. Allergic/Immunologic: Negative for immunocompromised state. Neurological: Negative for weakness and headaches. Hematological: Does not bruise/bleed easily. Psychiatric/Behavioral: Negative for confusion. Physical Exam  
Physical Exam  
Constitutional: He is oriented to person, place, and time. He appears distressed (mild). Frail, cachectic. Mild distress HENT:  
Head: Normocephalic and atraumatic. Moist mucous membranes Eyes: Conjunctivae are normal. Pupils are equal, round, and reactive to light. Right eye exhibits no discharge. Left eye exhibits no discharge. Neck: Normal range of motion. Neck supple. No tracheal deviation present. Cardiovascular: Normal rate, regular rhythm and normal heart sounds. No murmur heard. Pulmonary/Chest: Effort normal and breath sounds normal. No respiratory distress. He has no wheezes. He has no rales. Abdominal: Soft. Bowel sounds are normal. There is tenderness (suprapubic). There is no rebound and no guarding. Palpable bladder just below the umbilicus Musculoskeletal: Normal range of motion. He exhibits no edema, tenderness or deformity. Significant muscle waste. Neurological: He is alert and oriented to person, place, and time. Skin: Skin is warm and dry. No rash noted. No erythema. Stage 2 decubitus ulcer. Psychiatric: His behavior is normal.  
Nursing note and vitals reviewed. Diagnostic Study Results Labs - No results found for this or any previous visit (from the past 12 hour(s)). Radiologic Studies - No orders to display Medical Decision Making I am the first provider for this patient. I reviewed the vital signs, available nursing notes, past medical history, past surgical history, family history and social history. Vital Signs-Reviewed the patient's vital signs. Patient Vitals for the past 12 hrs: 
 Temp Pulse Resp BP SpO2  
02/03/19 1239 97.2 °F (36.2 °C) 90 16 102/43 95 % Pulse Oximetry Analysis - 95% on RA Cardiac Monitor:  
Rate: 90 bpm 
 
Records Reviewed: Nursing Notes, Old Medical Records, Previous electrocardiograms, Previous Radiology Studies and Previous Laboratory Studies Provider Notes (Medical Decision Making):  
Pt appears to have symptomatic urinary retention. He will be initiating hospice service soon. Discussion with family was had and no further intervention needed per discussion at this time. Pt's sxs resolved with the rivera catheter placement. ED Course:  
Initial assessment performed. The patients presenting problems have been discussed, and they are in agreement with the care plan formulated and outlined with them. I have encouraged them to ask questions as they arise throughout their visit. Procedure Note - Rectal Exam:  
2:10 PM 
Performed by: Senia Davison DO Chaperoned by: Francisco Cordero RN Rectal exam performed. Soft brown stool was collected. Stool was collected and sent to the lab for Hemoccult testing. The procedure took 1-15 minutes, and pt tolerated well. PROGRESS NOTE: 
2:37 PM 
Rivera Catheter placed, urine clear, pt feels better at this time Written by Majo Lugo ED Scribrossy, as dictated by Senia Davison DO. Critical Care Time:  
0 Disposition: 
DISCHARGE NOTE: 
2:44 PM 
 The patient is ready for discharge. The patients signs, symptoms, diagnosis, and instructions for discharge have been discussed and the pt has conveyed their understanding. The patient is to follow up as recommended with PCP or return to the ER should their symptoms worsen. Plan has been discussed and patient has conveyed their agreement. PLAN: 
1. Current Discharge Medication List  
  
 
2. Follow-up Information Follow up With Specialties Details Why Contact Info Giorgio Brown MD Family Practice  As needed [address] [facility] [id number] 
[phone] [facility] EMERGENCY DEPT Emergency Medicine  As needed, If symptoms worsen [address] [facility] 
[phone] Return to ED if worse Diagnosis Clinical Impression: 1. Urinary retention 2. Failure to thrive in adult 3. Malignant neoplasm of upper lobe of left lung ([facility] [address] Attestations: This note is prepared by Starr Perkins, acting as Scribe for [employer], DO. [employer], DO: The scribe's documentation has been prepared under my direction and personally reviewed by me in its entirety. I confirm that the note above accurately reflects all work, treatment, procedures, and medical decision making performed by me.

## 2019-02-03 NOTE — DISCHARGE INSTRUCTIONS
Patient Education        Lung Cancer: Care Instructions  Your Care Instructions    Lung cancer occurs when abnormal cells grow out of control in the lung. Lung cancer can start anywhere in the lungs and spread to other parts of the body. Treatment for lung cancer depends on what type of lung cancer you have and how advanced it is. Treatment may include surgery to remove the cancer. It could also include medicines (chemotherapy) or radiation to destroy cancer cells. Being treated for cancer can weaken your body, and you may feel very tired. Home treatment and certain medicines can help you feel better. Finding out that you have cancer is scary. You may feel many emotions and may need some help coping. Seek out family, friends, and counselors for support. You also can do things at home to make yourself feel better while you go through treatment. Call the [employer] [facility] (9-960.313.9906) or visit its website at [address] [employer] ([employer]) for more information. Follow-up care is a key part of your treatment and safety. Be sure to make and go to all appointments, and call your doctor if you are having problems. It's also a good idea to know your test results and keep a list of the medicines you take. How can you care for yourself at home? · Take your medicines exactly as prescribed. Call your doctor if you think you are having a problem with your medicine. You will get more details on the specific medicines your doctor prescribes. · Follow your doctor's instructions to relieve pain. Use pain medicine when you first feel pain, before it becomes severe. Taking pain medicines regularly is often the best way to keep pain under control. · Eat healthy food. If you do not feel like eating, try to eat food that has protein and extra calories to keep up your strength and prevent weight loss. Drink liquid meal replacements for extra calories and protein. Try to eat your main meal early.  Eating smaller portions more often may help as well. · Get some physical activity every day, but do not get too tired. Keep doing the hobbies you enjoy as your energy allows. · Do not smoke. Smoking can make your cancer symptoms worse. If you need help quitting, talk to your doctor about stop-smoking programs and medicines. These can increase your chances of quitting for good. · If you use oxygen, do not smoke, light a cigarette, or use a flame while your oxygen is on. Smoking while using oxygen can lead to fire and even explosion. · If you have nausea, try to eat several small meals a day. When you feel better, eat clear soups and mild foods until all symptoms are gone for 12 to 48 hours. Other good choices include dry toast, crackers, cooked cereal, and gelatin dessert, such as Jell-O.  · If you are vomiting or have diarrhea:  ? Drink plenty of fluids (enough so that your urine is light yellow or clear like water) to prevent dehydration. Choose water and other caffeine-free clear liquids. If you have kidney, heart, or liver disease and have to limit fluids, talk with your doctor before you increase the amount of fluids you drink. ? When you are able to eat, try clear soups, mild foods, and liquids until all symptoms are gone for 12 to 48 hours. Other good choices include dry toast, crackers, cooked cereal, and gelatin dessert, such as Jell-O.  · Take steps to control your stress and workload. Learn relaxation techniques. ? Share your feelings. Stress and tension affect our emotions. By expressing your feelings to others, you may be able to understand and cope with them. ? Consider joining a support group. Talking about a problem with your spouse, a good friend, or other people with similar problems is a good way to reduce tension and stress. ? Express yourself with art. Try writing, crafts, dance, or art to relieve stress. Some dance, writing, or art groups may be available just for people who have cancer. ? Be kind to your body and mind. Getting enough sleep, eating a healthy diet, and taking time to do things you enjoy can contribute to an overall feeling of balance in your life and help reduce stress. ? Get help if you need it. Discuss your concerns with your doctor or counselor. · If you have not already done so, prepare a list of advance directives. Advance directives are instructions to your doctor and family members about what kind of care you want if you become unable to speak or express yourself. When should you call for help? Call 911 anytime you think you may need emergency care. For example, call if:    · You passed out (lost consciousness).     · You have severe trouble breathing.     · You cough up a lot of blood.    Call your doctor now or seek immediate medical care if:    · You have a fever.     · You are short of breath.     · You have new or worse pain.     · You have a new or worse cough.     · You think you have an infection.    Watch closely for changes in your health, and be sure to contact your doctor if:    · You do not get better as expected. Where can you learn more? Go to http://bird-radha.info/. Enter P664 in the search box to learn more about \"Lung Cancer: Care Instructions. \"  Current as of: March 27, 2018  Content Version: 11.9  © 9329-6306 [employer]. Care instructions adapted under license by [employer] (which disclaims liability or warranty for this information). If you have questions about a medical condition or this instruction, always ask your healthcare professional. [place] [address] any warranty or liability for your use of this information.

## 2019-02-04 ENCOUNTER — PATIENT OUTREACH (OUTPATIENT)
Dept: FAMILY MEDICINE CLINIC | Age: 84
End: 2019-02-04

## 2019-02-04 NOTE — PROGRESS NOTES
Hospital Discharge Follow-Up Date/Time:  2/4/2019 2:32 PM 
 
Patient was admitted to [facility] on 1/30/19 and discharged on 2/1/19 for pneumothorax and metastatic lung cancer. The physician discharge summary was not available at the time of outreach. Patient was contacted within 1 business days of discharge. Inpatient RRAT score: 35 Was this a readmission? no  
Patient stated reason for the readmission: n/a Disease Specific:   N/A Summary of patient's top problems: 1. Metastatic lung cancer HOSPITAL COURSE:  The patient was admitted. He developed severe lower abdominal pain the night of admission. Did get somewhat better the next morning was felt to have a distended bladder on exam.  Bladder scanning revealed over 500 mL of urine in his bladder. He was initially reluctant to have a catheter placed, but eventually this was placed. Flat and upright KUB did show mild amount of stool, otherwise was normal.  The following morning, his chest tube was removed. We tried to do an upper GI barium swallow. However, the patient would not drink barium was also seen in consultation by Dr. Cornel Marie who felt like a barium swallow was appropriate. He was reluctant to do an EGD due to the patient's severe illness. Patient is followed by Dr. Zackary De La Vega and Dr. Gonzalo Mckeon. Dr. Gonzalo Mckeon felt that he was too weak to continue to [address] [place] [address]. It was felt by everybody involved that the patient should start on hospice. He finally consented to this. His daughter was also in favor of this. It was debated whether he could have a PleurX catheter placed; however, the patient just wanted to leave the hospital.  He was discharged home on 02/01. Hospices saw the patient hospital and this will be started shortly. 2. No advance care on file Home Health orders at discharge: none  Hospice [address] Date of initial visit: non admit Durable Medical Equipment ordered/company: n/a 
 Durable Medical Equipment received: n/a Barriers to care? lack of knowledge about disease, stages of grief, support system Wife left home about 1 month ago Advance Care Planning:  
Does patient have an Advance Directive:  not on file Medication(s):  
New Medications at Discharge: Protonix Changed Medications at Discharge: lasix Discontinued Medications at Discharge: Atenolol, pravastatin, Vit D Referral to Pharm D needed: no  
 
Current Outpatient Medications Medication Sig  furosemide (LASIX) 40 mg tablet 1  tab po daily for fluid  pantoprazole (PROTONIX) 40 mg granules for oral suspension Take 40 mg by mouth Daily (before breakfast).  morphine IR (MS IR) 15 mg tablet 2 tabs po every 4-6 hours as needed for pain  ALPRAZolam (XANAX) 1 mg tablet TAKE ONE TABLET BY MOUTH TWICE DAILY AS NEEDED FOR ANXIETY  escitalopram oxalate (LEXAPRO) 10 mg tablet Take 1 Tab by mouth daily.  ondansetron (ZOFRAN ODT) 4 mg disintegrating tablet Take 1 Tab by mouth every eight (8) hours as needed for Nausea.  polyethylene glycol (MIRALAX) 17 gram/dose powder Take 17 g by mouth two (2) times a day.  megestrol (MEGACE) 20 mg tablet Take 1 Tab by mouth daily. For appetite  terazosin (HYTRIN) 10 mg capsule TAKE ONE CAPSULE BY MOUTH ONCE DAILY FOR BLOOD PRESSURE AND PROSTATE  piroxicam (FELDENE) 20 mg capsule Take 1 Cap by mouth daily. For pain  
 gabapentin (NEURONTIN) 300 mg capsule One tab po qhs- 1st week. Then 1 tab po bid- 2nd week. Then 1 tab po tid. For pain in feet  allopurinol (ZYLOPRIM) 300 mg tablet TAKE ONE TABLET BY MOUTH ONCE DAILY TO PREVENT GOUT  
 cetirizine (ZYRTEC) 10 mg tablet Take 1 Tab by mouth daily.  finasteride (PROSCAR) 5 mg tablet Take 1 Tab by mouth daily. For prostate  VIT C/E/ZN/COPPR/LUTEIN/ZEAXAN (PRESERVISION AREDS 2 PO) Take  by mouth daily.  MULTIVITAMINS (MULTIPLE VITAMINS PO) Take 1 Tab by mouth daily. No current facility-administered medications for this visit. There are no discontinued medications. [facility] follow up appointment(s): No future appointments. Non-[facility] follow up appointment(s):  
[facility]:  out of service area Goals  Prevent complications post hospitalization. 2/4/19 Patient is scheduled for porex drain this week as outpatient and then will be admitted to hospice.  HALLEY

## 2019-02-08 ENCOUNTER — TELEPHONE (OUTPATIENT)
Dept: FAMILY MEDICINE CLINIC | Age: 84
End: 2019-02-08

## 2019-03-06 ENCOUNTER — PATIENT OUTREACH (OUTPATIENT)
Dept: FAMILY MEDICINE CLINIC | Age: 84
End: 2019-03-06

## 2019-03-06 NOTE — PROGRESS NOTES
Discharge instructions have been reviewed with patient and present family. Copy given to patient. Pt verbalized understand of instructions and was given  the opportunity to ask questions and receive answers prior to leaving. Pt discharged with family and assisted out by RN in wheelchair. HPI     MARIANO never.  Patient notices he has some trouble OD with  Night driving   for the past year, OS seems fine.  No trouble reading. Not using nay   drops.    Last edited by Mi Gibbons on 3/6/2019  1:13 PM. (History)            Assessment /Plan     For exam results, see Encounter Report.    Myopia of both eyes with astigmatism      1. Spec Rx given. Different lens options discussed with patient. RTC 1 year full exam.

## 2019-03-11 ENCOUNTER — PATIENT OUTREACH (OUTPATIENT)
Dept: FAMILY MEDICINE CLINIC | Age: 84
End: 2019-03-11

## 2021-08-03 PROBLEM — E11.9 DIABETES MELLITUS, TYPE 2 (HCC): Status: RESOLVED | Noted: 2021-08-03 | Resolved: 2021-08-03

## 2023-03-19 NOTE — PROGRESS NOTES
Discharge instructions have been reviewed with patient and present family. Copy given to patient. Pt verbalized understand of instructions and was given  the opportunity to ask questions and receive answers prior to leaving. Pt discharged with family and assisted out by RN in wheelchair. show

## 2024-10-16 NOTE — ANESTHESIA POSTPROCEDURE EVALUATION
Post-Anesthesia Evaluation and Assessment    Patient: Phoenix Kruse MRN: [id number]  SSN: [id number]    YOB: 1933  Age: 80 y.o. Sex: male       Cardiovascular Function/Vital Signs  Visit Vitals    /68    Pulse (!) 57    Temp 36.8 °C (98.2 °F)    Resp 16    Ht 5' 7\" (1.702 m)    Wt 72.6 kg (160 lb 2 oz)    SpO2 98%    BMI 25.08 kg/m2       Patient is status post MAC anesthesia for Procedure(s):  CATARACT EXTRACTION WITH INTRA OCULAR LENS IMPLANT LEFT EYE. Nausea/Vomiting: None    Postoperative hydration reviewed and adequate. Pain:  Pain Scale 1: Numeric (0 - 10) (01/22/18 1021)  Pain Intensity 1: 0 (01/22/18 1021)   Managed    Neurological Status:   Neuro (WDL): Within Defined Limits (awake & alert, [place]) (01/22/18 1016)   At baseline    Mental Status and Level of Consciousness: Arousable    Pulmonary Status:   O2 Device: Room air (01/22/18 1017)   Adequate oxygenation and airway patent    Complications related to anesthesia: None    Post-anesthesia assessment completed.  No concerns    Signed By: Gamal Denton MD     January 22, 2018 1-2 cups/cans per day

## (undated) DEVICE — SURGICAL PROCEDURE PACK EYE CUST DR CHNDLR

## (undated) DEVICE — SOLUTION IV STRL H2O 500 ML AQUALITE POUR BTL

## (undated) DEVICE — HIGH FLOW RATE EXTENSION SET, LUER LOCK ADAPTERS

## (undated) DEVICE — SURGICAL PROCEDURE PACK CATRCT CUST

## (undated) DEVICE — TOWEL SURG W17XL27IN STD BLU COT NONFENESTRATED PREWASHED

## (undated) DEVICE — SOLUTION IV 250ML 0.9% SOD CHL CLR INJ FLX BG CONT PRT CLSR

## (undated) DEVICE — THE MONARCH® "D" CARTRIDGE IS A SINGLE-USE POLYPROPYLENE CARTRIDGE FOR POSTERIOR CHAMBER IOL DELIVERY: Brand: MONARCH® III

## (undated) DEVICE — CATHETER IV 22GA L1IN TEF FEP STR HUB INTROCAN SFTY

## (undated) DEVICE — STERILE POLYISOPRENE POWDER-FREE SURGICAL GLOVES: Brand: PROTEXIS

## (undated) DEVICE — KENDALL DL ECG CABLE AND LEAD WIRE SYSTEM, 3-LEAD, SINGLE PATIENT USE: Brand: KENDALL

## (undated) DEVICE — SEALANT SURG CORNEA PROPHYLACTIC STRL RESURE